# Patient Record
Sex: MALE | Race: WHITE | Employment: FULL TIME | URBAN - METROPOLITAN AREA
[De-identification: names, ages, dates, MRNs, and addresses within clinical notes are randomized per-mention and may not be internally consistent; named-entity substitution may affect disease eponyms.]

---

## 2017-12-05 ENCOUNTER — HOSPITAL ENCOUNTER (OUTPATIENT)
Dept: PREADMISSION TESTING | Age: 57
Discharge: HOME | End: 2017-12-05
Payer: COMMERCIAL

## 2017-12-05 VITALS — HEIGHT: 69 IN | BODY MASS INDEX: 46.65 KG/M2 | WEIGHT: 315 LBS

## 2017-12-05 LAB
ANION GAP SERPL CALC-SCNC: 7 MMOL/L (ref 3–18)
ATRIAL RATE: 69 BPM
BUN SERPL-MCNC: 16 MG/DL (ref 7–18)
BUN/CREAT SERPL: 15 (ref 12–20)
CALCIUM SERPL-MCNC: 9.1 MG/DL (ref 8.5–10.1)
CALCULATED P AXIS, ECG09: 45 DEGREES
CALCULATED R AXIS, ECG10: 22 DEGREES
CALCULATED T AXIS, ECG11: 41 DEGREES
CHLORIDE SERPL-SCNC: 104 MMOL/L (ref 100–108)
CO2 SERPL-SCNC: 30 MMOL/L (ref 21–32)
CREAT SERPL-MCNC: 1.05 MG/DL (ref 0.6–1.3)
DIAGNOSIS, 93000: NORMAL
ERYTHROCYTE [DISTWIDTH] IN BLOOD BY AUTOMATED COUNT: 12.7 % (ref 11.6–14.5)
GLUCOSE SERPL-MCNC: 127 MG/DL (ref 74–99)
HBA1C MFR BLD: 6.9 % (ref 4.5–5.6)
HCT VFR BLD AUTO: 41.7 % (ref 36–48)
HGB BLD-MCNC: 14.2 G/DL (ref 13–16)
MCH RBC QN AUTO: 31.2 PG (ref 24–34)
MCHC RBC AUTO-ENTMCNC: 34.1 G/DL (ref 31–37)
MCV RBC AUTO: 91.6 FL (ref 74–97)
P-R INTERVAL, ECG05: 152 MS
PLATELET # BLD AUTO: 240 K/UL (ref 135–420)
PMV BLD AUTO: 10 FL (ref 9.2–11.8)
POTASSIUM SERPL-SCNC: 4.3 MMOL/L (ref 3.5–5.5)
Q-T INTERVAL, ECG07: 416 MS
QRS DURATION, ECG06: 96 MS
QTC CALCULATION (BEZET), ECG08: 445 MS
RBC # BLD AUTO: 4.55 M/UL (ref 4.7–5.5)
SODIUM SERPL-SCNC: 141 MMOL/L (ref 136–145)
VENTRICULAR RATE, ECG03: 69 BPM
WBC # BLD AUTO: 7.9 K/UL (ref 4.6–13.2)

## 2017-12-05 PROCEDURE — 93005 ELECTROCARDIOGRAM TRACING: CPT

## 2017-12-05 PROCEDURE — 80048 BASIC METABOLIC PNL TOTAL CA: CPT | Performed by: ORTHOPAEDIC SURGERY

## 2017-12-05 PROCEDURE — 83036 HEMOGLOBIN GLYCOSYLATED A1C: CPT | Performed by: ANESTHESIOLOGY

## 2017-12-05 PROCEDURE — 85027 COMPLETE CBC AUTOMATED: CPT | Performed by: ORTHOPAEDIC SURGERY

## 2017-12-05 RX ORDER — SODIUM CHLORIDE, SODIUM LACTATE, POTASSIUM CHLORIDE, CALCIUM CHLORIDE 600; 310; 30; 20 MG/100ML; MG/100ML; MG/100ML; MG/100ML
125 INJECTION, SOLUTION INTRAVENOUS CONTINUOUS
Status: CANCELLED | OUTPATIENT
Start: 2017-12-05

## 2017-12-05 RX ORDER — AMLODIPINE BESYLATE 5 MG/1
5 TABLET ORAL
COMMUNITY

## 2017-12-05 RX ORDER — MINERAL OIL
180 ENEMA (ML) RECTAL DAILY
COMMUNITY

## 2017-12-05 NOTE — PERIOP NOTES
Fasting blood sugar on admit PCP aware of scheduled surgery does not meet criteria for special population

## 2017-12-18 PROBLEM — I10 HTN (HYPERTENSION): Chronic | Status: ACTIVE | Noted: 2017-12-18

## 2017-12-18 PROBLEM — S83.242A TEAR OF MEDIAL MENISCUS OF LEFT KNEE, CURRENT: Chronic | Status: ACTIVE | Noted: 2017-12-18

## 2017-12-18 PROBLEM — E78.00 HIGH CHOLESTEROL: Chronic | Status: ACTIVE | Noted: 2017-12-18

## 2017-12-19 NOTE — H&P
History and Physical        Patient: Manny Stauffer               Sex: male          DOA: (Not on file)         YOB: 1960      Age:  56 y.o.        LOS:  LOS: 0 days        HPI:   Cheikh Yousif is in for follow-up of his left knee medial meniscal tear as seen by MRI/right shoulder subacromial bursitis/right upper extremity cubital tunnel syndrome/left greater than right hip bursitis. His left knee where he has a medial meniscal tear we had talked about arthroscopy. He continues to have pain in the knee despite wearing a brace. Standing AP, tunnel, lateral, and sunrise views of the left knee were obtained and interpreted in the office today and reveal no periosteal reaction, no medullary lesions, no osteopenia, well-aligned joint spaces, and no chondrolysis. Past Medical History:   Diagnosis Date    Arthritis     Bursitis of right shoulder     Chronic pain     right shoulder and lower back.  Diabetes ([address]     GERD (gastroesophageal reflux disease)     Hypertension     Morbid obesity ([address]        Past Surgical History:   Procedure Laterality Date    HX GI      EGD, Colonoscopy    HX ORTHOPAEDIC Left     CTR    HX OTHER SURGICAL      wisdom teeth extraction ,bone graft    HX OTHER SURGICAL         No family history on file. Social History     Social History    Marital status:      Spouse name: N/A    Number of children: N/A    Years of education: N/A     Social History Main Topics    Smoking status: Current Some Day Smoker    Smokeless tobacco: Never Used      Comment: one cigar 3 x year    Alcohol use 3.6 oz/week     2 Glasses of wine, 2 Cans of beer, 2 Shots of liquor per week    Drug use: No    Sexual activity: Yes     Partners: Female     Other Topics Concern    Not on file     Social History Narrative       Prior to Admission medications    Medication Sig Start Date End Date Taking?  Authorizing Provider   amLODIPine (NORVASC) 5 mg tablet Take 5 mg by mouth daily. Historical Provider   dulaglutide (TRULICITY) 1.5 WN/7.1 mL sub-q pen 1.5 mg by SubCUTAneous route every seven (7) days. Historical Provider   fexofenadine (ALLEGRA) 180 mg tablet Take 180 mg by mouth daily. Historical Provider   metFORMIN (GLUCOPHAGE) 1,000 mg tablet Take 1,000 mg by mouth two (2) times daily (with meals). Historical Provider   ergocalciferol (ERGOCALCIFEROL) 50,000 unit capsule Take 50,000 Units by mouth every Monday. Historical Provider   irbesartan (AVAPRO) 150 mg tablet Take 150 mg by mouth two (2) times a day. Historical Provider   atorvastatin (LIPITOR) 20 mg tablet Take 20 mg by mouth. Monday, Tuesday, Wednesday, Thursday, Friday    Historical Provider   fluocinoNIDE (LIDEX) 0.05 % external solution Apply  to affected area daily as needed. Historical Provider   triamcinolone acetonide (KENALOG) 0.1 % topical cream Apply  to affected area daily as needed for Skin Irritation. use thin layer    Historical Provider   sildenafil citrate (VIAGRA) 100 mg tablet Take 100 mg by mouth as needed. Historical Provider   zolpidem (AMBIEN) 5 mg tablet Take 5 mg by mouth nightly as needed for Sleep. Historical Provider   HYDROcodone-acetaminophen (NORCO) 5-325 mg per tablet Take 1 Tab by mouth every four (4) hours as needed for Pain. Historical Provider   metaxalone (SKELAXIN) 800 mg tablet Take 800 mg by mouth every six (6) hours as needed for Pain. Historical Provider   traMADol (ULTRAM) 50 mg tablet Take 50 mg by mouth every six (6) hours as needed for Pain. Historical Provider   amLODIPine (NORVASC) 2.5 mg tablet Take 2.5 mg by mouth every evening. Historical Provider   carvedilol (COREG) 12.5 mg tablet Take 12.5 mg by mouth two (2) times a day. Historical Provider   esomeprazole (NEXIUM) 20 mg capsule Take 20 mg by mouth daily.     Historical Provider       Allergies   Allergen Reactions    Bydureon [Exenatide Microspheres] Other (comments) Scar tissue under skin       Review of Systems  A complete review of systems was completed. Pertinent positives include numbness/tingling and sprain/strain. Pertinent negatives include blurred vision, chest pain, chills, cold, discharge of the eyes, dizziness, double vision, fever, headache, hearing loss, heart murmur, itching of the eyes, palpitations, redness of the eyes, rheumatic fever, ringing in ears, sore throat/hoarseness, weight change, abdominal pain, anxiety, bipolar disorder, bladder/kidney infection, bloody stool, blood in urine, burning sensation, changes in mood, chronic cough, depression, diarrhea, difficulty breathing, difficulty swallowing, fainting, frequent urinating, fracture/dislocation, gas/bloating, gout, hemorrhoids, incontinence, joint pain, joint stiffness, loss of balance, memory loss, muscle weakness, nausea/vomiting, pain on breathing, painful urination, psoriasis, rash/itching, Raynaud's phenomenon, rheumatoid disease, seizure disorder, shortness of breath, swelling of feet, tendinitis, varicose veins and wheezing. Physical Exam:      There were no vitals taken for this visit. Physical Exam:  Physical exam shows a healthy-appearing middle-aged white male. The left knee is tender on the posteromedial joint line. Examination of the left knee demonstrates the patient has full extension to flexion well beyond 90 degrees. The patient has a negative Lachman and has no varus or valgus instability at zero degrees or 30 degrees. There is a negative posterior sag. There is no knee effusion. There is no swelling, ecchymosis or wounds. The patient has no lateral joint line pain and no popliteal pain. The patient has a negative patellar inhibition, a negative patellar grind and a negative patellar apprehension test.  There is a negative Maxi Maneuver. There is no pain at the inferior pole of the patella or the tibial tubercle.   The patient has 5/5 muscle strength with good quadriceps tone. The patient has 2+ pedal pulses with normal motor strength of the foot and ankle and normal light-touch sensation in the foot and lower leg. Assessment/Plan     Principal Problem:    Tear of medial meniscus of left knee, current (12/18/2017)    Active Problems:    High cholesterol (12/18/2017)      HTN (hypertension) (12/18/2017)      Dr. Yudi Estrada scheduled him for a left knee arthroscopy with partial medial meniscectomy on 12/21/17. He will be out of work likely about six weeks' time. Dr. Yudi Estrada issued Oscar Robledo for postoperative pain use. He understands the risks associated with the surgery including DVT, PE, bleeding, infection, need for future surgery, neurovascular compromise amongst others. He will follow up with Dr. Yudi Estrada one week post op.

## 2017-12-20 RX ORDER — DIPHENHYDRAMINE HCL 25 MG
25 CAPSULE ORAL
Status: CANCELLED | OUTPATIENT
Start: 2017-12-20

## 2017-12-20 RX ORDER — SODIUM CHLORIDE 0.9 % (FLUSH) 0.9 %
5-10 SYRINGE (ML) INJECTION EVERY 8 HOURS
Status: CANCELLED | OUTPATIENT
Start: 2017-12-20

## 2017-12-20 RX ORDER — SODIUM CHLORIDE, SODIUM LACTATE, POTASSIUM CHLORIDE, CALCIUM CHLORIDE 600; 310; 30; 20 MG/100ML; MG/100ML; MG/100ML; MG/100ML
125 INJECTION, SOLUTION INTRAVENOUS CONTINUOUS
Status: CANCELLED | OUTPATIENT
Start: 2017-12-20 | End: 2017-12-21

## 2017-12-20 RX ORDER — SODIUM CHLORIDE 0.9 % (FLUSH) 0.9 %
5-10 SYRINGE (ML) INJECTION AS NEEDED
Status: CANCELLED | OUTPATIENT
Start: 2017-12-20

## 2017-12-21 ENCOUNTER — HOSPITAL ENCOUNTER (OUTPATIENT)
Age: 57
Setting detail: OUTPATIENT SURGERY
Discharge: HOME | End: 2017-12-21
Attending: ORTHOPAEDIC SURGERY | Admitting: ORTHOPAEDIC SURGERY
Payer: COMMERCIAL

## 2017-12-21 ENCOUNTER — ANESTHESIA EVENT (OUTPATIENT)
Dept: SURGERY | Age: 57
End: 2017-12-21
Payer: COMMERCIAL

## 2017-12-21 ENCOUNTER — ANESTHESIA (OUTPATIENT)
Dept: SURGERY | Age: 57
End: 2017-12-21
Payer: COMMERCIAL

## 2017-12-21 VITALS
OXYGEN SATURATION: 95 % | RESPIRATION RATE: 16 BRPM | HEART RATE: 65 BPM | WEIGHT: 315 LBS | SYSTOLIC BLOOD PRESSURE: 124 MMHG | TEMPERATURE: 97.6 F | BODY MASS INDEX: 46.65 KG/M2 | DIASTOLIC BLOOD PRESSURE: 68 MMHG | HEIGHT: 69 IN

## 2017-12-21 PROBLEM — S83.242A ACUTE MEDIAL MENISCUS TEAR OF LEFT KNEE: Status: ACTIVE | Noted: 2017-12-21

## 2017-12-21 LAB
GLUCOSE BLD STRIP.AUTO-MCNC: 124 MG/DL (ref 70–110)
GLUCOSE BLD STRIP.AUTO-MCNC: 139 MG/DL (ref 70–110)

## 2017-12-21 PROCEDURE — 76010000138 HC OR TIME 0.5 TO 1 HR: Performed by: ORTHOPAEDIC SURGERY

## 2017-12-21 PROCEDURE — 76210000063 HC OR PH I REC FIRST 0.5 HR: Performed by: ORTHOPAEDIC SURGERY

## 2017-12-21 PROCEDURE — 77030020782 HC GWN BAIR PAWS FLX 3M -B: Performed by: ORTHOPAEDIC SURGERY

## 2017-12-21 PROCEDURE — 77030008477 HC STYL SATN SLP COVD -A: Performed by: ANESTHESIOLOGY

## 2017-12-21 PROCEDURE — 74011250636 HC RX REV CODE- 250/636

## 2017-12-21 PROCEDURE — 77030034478 HC TU IRR ARTHRO PT ARTH -B: Performed by: ORTHOPAEDIC SURGERY

## 2017-12-21 PROCEDURE — 77030036563 HC WRP CLD THER KNE S2SG -B: Performed by: ORTHOPAEDIC SURGERY

## 2017-12-21 PROCEDURE — 74011000250 HC RX REV CODE- 250: Performed by: ORTHOPAEDIC SURGERY

## 2017-12-21 PROCEDURE — 77030002933 HC SUT MCRYL J&J -A: Performed by: ORTHOPAEDIC SURGERY

## 2017-12-21 PROCEDURE — 77030018835 HC SOL IRR LR ICUM -A: Performed by: ORTHOPAEDIC SURGERY

## 2017-12-21 PROCEDURE — 77030008683 HC TU ET CUF COVD -A: Performed by: ANESTHESIOLOGY

## 2017-12-21 PROCEDURE — 77030008509 HC TBNG IN/OUT FLO LEMV -B: Performed by: ORTHOPAEDIC SURGERY

## 2017-12-21 PROCEDURE — 77030028224 HC PDNG CST BSNM -A: Performed by: ORTHOPAEDIC SURGERY

## 2017-12-21 PROCEDURE — 77030006643: Performed by: ANESTHESIOLOGY

## 2017-12-21 PROCEDURE — 74011250636 HC RX REV CODE- 250/636: Performed by: ORTHOPAEDIC SURGERY

## 2017-12-21 PROCEDURE — 76060000032 HC ANESTHESIA 0.5 TO 1 HR: Performed by: ORTHOPAEDIC SURGERY

## 2017-12-21 PROCEDURE — 76210000026 HC REC RM PH II 1 TO 1.5 HR: Performed by: ORTHOPAEDIC SURGERY

## 2017-12-21 PROCEDURE — 77030006884 HC BLD SHV INCIS S&N -B: Performed by: ORTHOPAEDIC SURGERY

## 2017-12-21 PROCEDURE — 74011000250 HC RX REV CODE- 250

## 2017-12-21 PROCEDURE — 82962 GLUCOSE BLOOD TEST: CPT

## 2017-12-21 RX ORDER — SODIUM CHLORIDE, SODIUM LACTATE, POTASSIUM CHLORIDE, CALCIUM CHLORIDE 600; 310; 30; 20 MG/100ML; MG/100ML; MG/100ML; MG/100ML
125 INJECTION, SOLUTION INTRAVENOUS CONTINUOUS
Status: DISCONTINUED | OUTPATIENT
Start: 2017-12-21 | End: 2017-12-21 | Stop reason: HOSPADM

## 2017-12-21 RX ORDER — DEXTROSE 50 % IN WATER (D50W) INTRAVENOUS SYRINGE
25-50 AS NEEDED
Status: CANCELLED | OUTPATIENT
Start: 2017-12-21

## 2017-12-21 RX ORDER — HYDROMORPHONE HYDROCHLORIDE 2 MG/ML
0.5 INJECTION, SOLUTION INTRAMUSCULAR; INTRAVENOUS; SUBCUTANEOUS
Status: CANCELLED | OUTPATIENT
Start: 2017-12-21

## 2017-12-21 RX ORDER — PROPOFOL 10 MG/ML
INJECTION, EMULSION INTRAVENOUS AS NEEDED
Status: DISCONTINUED | OUTPATIENT
Start: 2017-12-21 | End: 2017-12-21 | Stop reason: HOSPADM

## 2017-12-21 RX ORDER — SUCCINYLCHOLINE CHLORIDE 20 MG/ML
INJECTION INTRAMUSCULAR; INTRAVENOUS AS NEEDED
Status: DISCONTINUED | OUTPATIENT
Start: 2017-12-21 | End: 2017-12-21 | Stop reason: HOSPADM

## 2017-12-21 RX ORDER — KETOROLAC TROMETHAMINE 30 MG/ML
INJECTION, SOLUTION INTRAMUSCULAR; INTRAVENOUS AS NEEDED
Status: DISCONTINUED | OUTPATIENT
Start: 2017-12-21 | End: 2017-12-21 | Stop reason: HOSPADM

## 2017-12-21 RX ORDER — FENTANYL CITRATE 50 UG/ML
INJECTION, SOLUTION INTRAMUSCULAR; INTRAVENOUS AS NEEDED
Status: DISCONTINUED | OUTPATIENT
Start: 2017-12-21 | End: 2017-12-21 | Stop reason: HOSPADM

## 2017-12-21 RX ORDER — ONDANSETRON 2 MG/ML
INJECTION INTRAMUSCULAR; INTRAVENOUS AS NEEDED
Status: DISCONTINUED | OUTPATIENT
Start: 2017-12-21 | End: 2017-12-21 | Stop reason: HOSPADM

## 2017-12-21 RX ORDER — INSULIN LISPRO 100 [IU]/ML
INJECTION, SOLUTION INTRAVENOUS; SUBCUTANEOUS ONCE
Status: CANCELLED | OUTPATIENT
Start: 2017-12-21 | End: 2017-12-21

## 2017-12-21 RX ORDER — NALOXONE HYDROCHLORIDE 0.4 MG/ML
0.1 INJECTION, SOLUTION INTRAMUSCULAR; INTRAVENOUS; SUBCUTANEOUS
Status: CANCELLED | OUTPATIENT
Start: 2017-12-21

## 2017-12-21 RX ORDER — MIDAZOLAM HYDROCHLORIDE 1 MG/ML
INJECTION, SOLUTION INTRAMUSCULAR; INTRAVENOUS AS NEEDED
Status: DISCONTINUED | OUTPATIENT
Start: 2017-12-21 | End: 2017-12-21 | Stop reason: HOSPADM

## 2017-12-21 RX ORDER — MAGNESIUM SULFATE 100 %
4 CRYSTALS MISCELLANEOUS AS NEEDED
Status: CANCELLED | OUTPATIENT
Start: 2017-12-21

## 2017-12-21 RX ORDER — SODIUM CHLORIDE 0.9 % (FLUSH) 0.9 %
5-10 SYRINGE (ML) INJECTION AS NEEDED
Status: CANCELLED | OUTPATIENT
Start: 2017-12-21

## 2017-12-21 RX ORDER — SODIUM CHLORIDE, SODIUM LACTATE, POTASSIUM CHLORIDE, CALCIUM CHLORIDE 600; 310; 30; 20 MG/100ML; MG/100ML; MG/100ML; MG/100ML
50 INJECTION, SOLUTION INTRAVENOUS CONTINUOUS
Status: CANCELLED | OUTPATIENT
Start: 2017-12-21

## 2017-12-21 RX ORDER — OXYCODONE AND ACETAMINOPHEN 5; 325 MG/1; MG/1
1 TABLET ORAL AS NEEDED
Status: CANCELLED | OUTPATIENT
Start: 2017-12-21

## 2017-12-21 RX ORDER — ONDANSETRON 2 MG/ML
4 INJECTION INTRAMUSCULAR; INTRAVENOUS ONCE
Status: CANCELLED | OUTPATIENT
Start: 2017-12-21 | End: 2017-12-21

## 2017-12-21 RX ORDER — FENTANYL CITRATE 50 UG/ML
25 INJECTION, SOLUTION INTRAMUSCULAR; INTRAVENOUS
Status: CANCELLED | OUTPATIENT
Start: 2017-12-21 | End: 2017-12-21

## 2017-12-21 RX ORDER — GLYCOPYRROLATE 0.2 MG/ML
INJECTION INTRAMUSCULAR; INTRAVENOUS AS NEEDED
Status: DISCONTINUED | OUTPATIENT
Start: 2017-12-21 | End: 2017-12-21 | Stop reason: HOSPADM

## 2017-12-21 RX ORDER — DEXAMETHASONE SODIUM PHOSPHATE 4 MG/ML
INJECTION, SOLUTION INTRA-ARTICULAR; INTRALESIONAL; INTRAMUSCULAR; INTRAVENOUS; SOFT TISSUE AS NEEDED
Status: DISCONTINUED | OUTPATIENT
Start: 2017-12-21 | End: 2017-12-21 | Stop reason: HOSPADM

## 2017-12-21 RX ORDER — LIDOCAINE HYDROCHLORIDE 20 MG/ML
INJECTION, SOLUTION EPIDURAL; INFILTRATION; INTRACAUDAL; PERINEURAL AS NEEDED
Status: DISCONTINUED | OUTPATIENT
Start: 2017-12-21 | End: 2017-12-21 | Stop reason: HOSPADM

## 2017-12-21 RX ADMIN — ONDANSETRON 4 MG: 2 INJECTION INTRAMUSCULAR; INTRAVENOUS at 08:56

## 2017-12-21 RX ADMIN — SUCCINYLCHOLINE CHLORIDE 120 MG: 20 INJECTION INTRAMUSCULAR; INTRAVENOUS at 08:53

## 2017-12-21 RX ADMIN — SODIUM CHLORIDE, SODIUM LACTATE, POTASSIUM CHLORIDE, AND CALCIUM CHLORIDE 125 ML/HR: 600; 310; 30; 20 INJECTION, SOLUTION INTRAVENOUS at 07:55

## 2017-12-21 RX ADMIN — SODIUM CHLORIDE, SODIUM LACTATE, POTASSIUM CHLORIDE, AND CALCIUM CHLORIDE 125 ML/HR: 600; 310; 30; 20 INJECTION, SOLUTION INTRAVENOUS at 09:55

## 2017-12-21 RX ADMIN — KETOROLAC TROMETHAMINE 30 MG: 30 INJECTION, SOLUTION INTRAMUSCULAR; INTRAVENOUS at 08:56

## 2017-12-21 RX ADMIN — DEXAMETHASONE SODIUM PHOSPHATE 4 MG: 4 INJECTION, SOLUTION INTRA-ARTICULAR; INTRALESIONAL; INTRAMUSCULAR; INTRAVENOUS; SOFT TISSUE at 08:56

## 2017-12-21 RX ADMIN — GLYCOPYRROLATE 0.2 MG: 0.2 INJECTION INTRAMUSCULAR; INTRAVENOUS at 08:43

## 2017-12-21 RX ADMIN — PROPOFOL 200 MG: 10 INJECTION, EMULSION INTRAVENOUS at 08:53

## 2017-12-21 RX ADMIN — MIDAZOLAM HYDROCHLORIDE 2 MG: 1 INJECTION, SOLUTION INTRAMUSCULAR; INTRAVENOUS at 08:43

## 2017-12-21 RX ADMIN — FENTANYL CITRATE 100 MCG: 50 INJECTION, SOLUTION INTRAMUSCULAR; INTRAVENOUS at 08:53

## 2017-12-21 RX ADMIN — LIDOCAINE HYDROCHLORIDE 80 MG: 20 INJECTION, SOLUTION EPIDURAL; INFILTRATION; INTRACAUDAL; PERINEURAL at 08:53

## 2017-12-21 NOTE — OP NOTES
SURGICAL ARTHROSCOPY LEFT KNEE, PARTIAL MEDIAL MENISCECTOMY    Patient: Elisabeth Hardy MRN: [id number]  [facility]: [id number]   YOB: 1960  Age: 56 y.o. Sex: male      Date of Surgery:12/21/2017      PREOPERATIVE DIAGNOSIS: Left posterior horn medial meniscal tear. POSTOPERATIVE DIAGNOSIS: Left posterior horn medial meniscal tear. PROCEDURES PERFORMED: Surgical arthroscopy, left knee, with partial medial meniscectomy. SURGEON: John Paul Cunha III, MD    ANESTHESIA: General    COMPLICATIONS: None. TOURNIQUET TIME: None. ESTIMATED BLOOD LOSS: Minimal.    SPECIMENS REMOVED: none    INDICATIONS: A 56y.o. year-old WHITE OR  male with known left knee medial meniscal tear as documented by MRI. The patient now presents for arthroscopic treatment due to continued pain despite conservative intervention for mechanical symptoms associated with knee pain from the recent meniscal tear. DESCRIPTION OF PROCEDURE: The patient was brought to the operating theater  and after adequate anesthesia, the left knee was prepped and draped in the  typical sterile fashion. The knee was instilled with 30 mL of 0.25%  Marcaine with 1:200,000 epinephrine and 4 mg of morphine sulfate. Standard  anterolateral and anteromedial portals were anesthetized with the same  mixture without morphine. The arthroscope was placed laterally and the probe medially. Findings at this time showed that the medial compartment had grade 2 articular surfaces with a significant medial meniscus tear on probing. Using the upbiting punch, this was balanced and contoured, removing approximately 20% of the entire size of the medial meniscus. All free fragments were removed with the Incisor Plus blade. Further probing showed a nice stable meniscal rim. No chondroplasty was required of the medial femoral condyle. The ACL and PCL were normal. The lateral joint line was examined in the figure-of-four position.   The lateral compartment had normal articular surfaces with a normal lateral meniscus. The patellofemoral joint was examined last in terminal extension. It showed a grade 3 trochlea and grade 3 patella. The joint was then thoroughly irrigated and decompressed. There was no evidence of synovitis and both the medial and lateral gutters were cleared  of any loose bodies. The joint space was decompressed. The  4-0 Monocryl's were used to close the portals. They were steri-stripped,  had 4 x 4's placed and an Ace wrap. The patient was then awakened from  anesthesia, and returned to the recovery room in awake, in stable  condition.  All instrument, sponge and needle counts were correct.        Wesley Siddiqui MD  12/21/2017

## 2017-12-21 NOTE — ANESTHESIA POSTPROCEDURE EVALUATION
Post-Anesthesia Evaluation and Assessment    Cardiovascular Function/Vital Signs  Visit Vitals    /75    Pulse 81    Temp 36.2 °C (97.1 °F)    Resp 16    Ht 5' 9\" (1.753 m)    Wt 143.4 kg (316 lb 1 oz)    SpO2 96%    BMI 46.67 kg/m2       Patient is status post Procedure(s):  SURGICAL ARTHROSCOPY LEFT KNEE W/PARTIAL MEDIAL MENISCECTOMY . Nausea/Vomiting: Controlled. Postoperative hydration reviewed and adequate. Pain:  Pain Scale 1: Numeric (0 - 10) (12/21/17 0954)  Pain Intensity 1: 0 (12/21/17 0954)   Managed. Neurological Status:   Neuro (WDL): Within Defined Limits (12/21/17 0926)   At baseline. Mental Status and Level of Consciousness: Arousable. Pulmonary Status:   O2 Device: Room air (12/21/17 0954)   Adequate oxygenation and airway patent. Complications related to anesthesia: None    Post-anesthesia assessment completed. No concerns. Patient has met all discharge requirements.     Signed By: Eloy Mckeon CRNA    December 21, 2017

## 2017-12-21 NOTE — IP AVS SNAPSHOT
[address] [facility] 
 
 
 [address] [facility] [address] Patient: Joseline Self MRN: [id number] MGN:[birth year] About your hospitalization You were admitted on:  December 21, 2017 You last received care in the:  THE [facility] PHASE 2 RECOVERY You were discharged on:  December 21, 2017 Why you were hospitalized Your primary diagnosis was:  Tear Of Medial Meniscus Of Left Knee, Current Your diagnoses also included:  High Cholesterol, Htn (Hypertension), Acute Medial Meniscus Tear Of Left Knee Things You Need To Do (next 8 weeks) Follow up with Catina Chua MD  
  
Phone:  [phone] Where:  [address] [place] [address], [address], [address] [facility] [address] Discharge Orders None A check rita indicates which time of day the medication should be taken. My Medications STOP taking these medications   
 traMADol 50 mg tablet Commonly known as:  ULTRAM  
   
  
  
TAKE these medications as instructed Instructions Each Dose to Equal  
 Morning Noon Evening Bedtime AMBIEN 5 mg tablet Generic drug:  zolpidem Your last dose was: Your next dose is: Take 5 mg by mouth nightly as needed for Sleep.  
 5 mg * amLODIPine 2.5 mg tablet Commonly known as:  [place] Citron Your last dose was: Your next dose is: Take 2.5 mg by mouth every evening. 2.5 mg  
    
   
   
   
  
 * amLODIPine 5 mg tablet Commonly known as:  [place] Citron Your last dose was: Your next dose is: Take 5 mg by mouth daily. 5 mg  
    
   
   
   
  
 atorvastatin 20 mg tablet Commonly known as:  LIPITOR Your last dose was: Your next dose is: Take 20 mg by mouth. Monday, Tuesday, Wednesday, Thursday, Friday 20 mg  
    
   
   
   
  
 carvedilol 12.5 mg tablet Commonly known as:  Zoë Do Your last dose was: Your next dose is: Take 12.5 mg by mouth two (2) times a day. 12.5 mg  
    
   
   
   
  
 dulaglutide 1.5 mg/0.5 mL sub-q pen Commonly known as:  TRULICITY Your last dose was: Your next dose is:    
   
   
 1.5 mg by SubCUTAneous route every seven (7) days. 1.5 mg  
    
   
   
   
  
 ergocalciferol 50,000 unit capsule Commonly known as:  ERGOCALCIFEROL Your last dose was: Your next dose is: Take 50,000 Units by mouth every Monday. [address] Units  
    
   
   
   
  
 fexofenadine 180 mg tablet Commonly known as:  Manjula Saran Your last dose was: Your next dose is: Take 180 mg by mouth daily. 180 mg  
    
   
   
   
  
 fluocinoNIDE 0.05 % external solution Commonly known as:  LIDEX Your last dose was: Your next dose is:    
   
   
 Apply  to affected area daily as needed. HYDROcodone-acetaminophen 5-325 mg per tablet Commonly known as:  Gracie Macias Your last dose was: Your next dose is: Take 1 Tab by mouth every four (4) hours as needed for Pain. 1 Tab  
    
   
   
   
  
 irbesartan 150 mg tablet Commonly known as:  AVAPRO Your last dose was: Your next dose is: Take 150 mg by mouth two (2) times a day. 150 mg  
    
   
   
   
  
 metaxalone 800 mg tablet Commonly known as:  SKELAXIN Your last dose was: Your next dose is: Take 800 mg by mouth every six (6) hours as needed for Pain. 800 mg  
    
   
   
   
  
 metFORMIN 1,000 mg tablet Commonly known as:  GLUCOPHAGE Your last dose was: Your next dose is: Take 1,000 mg by mouth two (2) times daily (with meals). 1000 mg NexIUM 20 mg capsule Generic drug:  esomeprazole Your last dose was: Your next dose is: Take 20 mg by mouth daily.   
 20 mg  
    
   
 triamcinolone acetonide 0.1 % topical cream  
Commonly known as:  KENALOG Your last dose was: Your next dose is:    
   
   
 Apply  to affected area daily as needed for Skin Irritation. use thin layer VIAGRA 100 mg tablet Generic drug:  sildenafil citrate Your last dose was: Your next dose is: Take 100 mg by mouth as needed. 100 mg * Notice: This list has 2 medication(s) that are the same as other medications prescribed for you. Read the directions carefully, and ask your doctor or other care provider to review them with you. Discharge Instructions Wesley Siddiqui III, MD Alcario Schanz, PA-C Lower Extremity Surgery Discharge Instructions Please take the time to review the following instructions before you leave the hospital and use them as guidelines during your recovery from surgery. If you have any questions you may contact my office at [phone]. Wound Care/Dressing Changes: 
 
   Keep the surgical dressing on for two days from the day of your surgery. Once you remove this, no dressing is necessary if there is no drainage. You may change your dressing as needed. Beginning the 2 days after you are discharged from the hospital you can change your dressing daily. A big, bulky dressing isn't necessary as long as there isn't any drainage from the incisions. You can put a band-aid or a pice of gauze over each incision and wear an ACE bandage as needed for comfort and swelling. Don't remove your dressing or get them wet. · It isn't necessary to apply antibiotic ointment to your incisions. Sutures will be removed at your one week post-op visit. Staples (if you have them) are removed in two weeks. If you have steri-strips over your incision they will start to peel off in 7-10 days as you get them wet. They don't need to be removed prior to that. When they begin to peel off you can remove them. They should all be removed by 14 days from your surgery. If your wound is closed with Dermabond nothing has to be done or removed. Showering/Bathing: You may shower 2 days after surgery. Your dressing may be removed for showering. You may get your incisions wet in the shower. Don't vigorously scrub the area where your incisions are. Apply a clean, dry dressing after drying off the area of your incisions. Don't take a tub bath, get in a swimming pool or Jacuzzi until the incisions are completely healed. Do not soak your incisions under water. Do not get the dressing wet. Once you remove it two days from surgery, you may get the incision wet if there is no drainage. Weight Bearing Status/Braces/Activity: 
 
   You may walk as tolerated and perform your normal daily activities. Use crutches, a walker, or a cane only if you need them. You should strive to achieve full range of motion in your knee as soon as possible. Please start using a stationary bike or walking for exercise 3 or 4 days after surgery. We would like for you to return to your normal activities as soon as possible. If you feel comfortable returning to work, you may do so at any time. Weight bearing as tolerated with the knee immobilizer in place. Use crutches, cane, or walker as needed. You should sleep in your knee immobilizer. Non-weight bearing. Please do not bear any weight on your leg. You may use your toes for balance when walking with a cane or crutches. Ice/Elevation: 
 
Continue ice and elevation consistently for 48 hours after surgery. When elevating your knee elevate it on about 4 pillows to be sure it is above your heart. After 48 hours, you should ice your knee 3 times per day, for 20 minutes at a time for the next 5 days.   After one week from surgery, you may use ice and elevation as needed for pain and swelling. Diet: 
 
You may advance to your regular diet as tolerated. Medication: 
 
1. You will be given a prescription for pain medications when you are discharged from the hospital.  Take the medication as needed according to the directions on the prescription bottle. Possible side effects of the medication include dizziness, headache, nausea, vomiting, constipation and urinary retention. If you experience any of these side effects call the office so that we can assist you in relieving them. Discontinue the use of the pain medication if you develop itching, rash, shortness of breath or difficulties swallowing. If these symptoms become severe or aren't relieved by discontinuing the medication you should seek immediate medical attention. Refills of pain medication are authorized during office hours only (8AM - 5PM Mon thru Fri). 2. If you were prescribed Percocet/oxycodone or Dilaudid/hydromorphone you must have a written prescription. These medications legally cannot be called in to a pharmacy. 3. You may take over the counter Ibuprofen/Advil/Aleve between dosages of your pain medication if needed. Do not take Tylenol in addition to your pain medication as most of the pain medication already contains Tylenol. Exceptions include Dilaudid/hydromorphone, Demerol/meperidine and roxicodone. Do not exceed 3000 mg of Tylenol per day. Ex:  (hydrocodon 5/325mg = 325 mg of Tylenol) 4. If you have had a joint replacement you should take Xarelto 10 mg daily for 28 days from the date of your surgery. This will help to prevent blood clots from forming in your legs. 5. You may resume the medication you were taking prior to your surgery. Pain medication may change the effects of any antidepressant medication.   If you have any questions about possible interactions between your regular medications and the pain medication you should consult the physician who prescribes your regular medications. Follow Up Appointment: If you are unsure of your follow-up appointment date and time, please call [phone]. Please let our  know you are scheduling a post-op appointment. Most appointments should be between 7-14 days after your surgery. Physical Therapy: 
 
   Physical therapy will be discussed with you at your first follow-up appointment with Dr. Duc Mckeon. You don't need to begin physical therapy prior to that visit. You are to participate with [address] [place] [address] as arranged pre-operatively in the convience of your own home. Physical therapy will be discussed with you at your first follow-up appointment with Dr. Duc Mckeon. You don't need to begin physical therapy prior to that visit. If you already have a therapy appointment, please be sure to attend your sessions as scheduled. If you do not have physical therapy scheduled, please call Dr. Duc Mckeon' office to set up your first appointment as soon as possible. You do not require Physical Therapy. Important signs and symptoms: 
 
If any of the following signs and symptoms occurs, you should contact Dr. Duc Mckeon' office. Please be advised if a problem arises which you feel required immediate medical attention or your are unable to contact Dr. Duc Mckeon' office you should seek immediate medical attention. Signs and symptoms to watch for include: 1. A sudden increase in swelling and/or redness or warmth at the area your surgery was performed which isn't relieved by rest, ice and elevation. 2. Oral temperature greater than 101.5 degrees for 12 hours or more which isn't relieved by an increase in fluid intake and taking two Tylenol every 4-6 hours. Do not exceed 3000 mg of Tylenol per day. 3. Excessive drainage from your incisions or drainage that hasn't stopped by 72 hours. 4. Calf pian, tenderness, redness or swelling which isn't relieved with rest and elevation. 5. Fever, chills, shortness of breath, chest pain, nausea, vomiting or other signs and symptoms which are of concern to you. DISCHARGE SUMMARY from Nurse PATIENT INSTRUCTIONS: 
 
 
F-face looks uneven A-arms unable to move or move unevenly S-speech slurred or non-existent T-time-call 911 as soon as signs and symptoms begin-DO NOT go Back to bed or wait to see if you get better-TIME IS BRAIN. Warning Signs of HEART ATTACK Call 911 if you have these symptoms: 
? Chest discomfort. Most heart attacks involve discomfort in the center of the chest that lasts more than a few minutes, or that goes away and comes back. It can feel like uncomfortable pressure, squeezing, fullness, or pain. ? Discomfort in other areas of the upper body. Symptoms can include pain or discomfort in one or both arms, the back, neck, jaw, or stomach. ? Shortness of breath with or without chest discomfort. ? Other signs may include breaking out in a cold sweat, nausea, or lightheadedness. Don't wait more than five minutes to call [address]! Fast action can save your life. Calling 911 is almost always the fastest way to get lifesaving treatment. Emergency Medical Services staff can begin treatment when they arrive  up to an hour sooner than if someone gets to the hospital by car. Patient armband removed and shredded The discharge information has been reviewed with the patient and guardian. The patient and caregiver verbalized understanding.  
Discharge medications reviewed with the patient and caregiver and appropriate educational materials and side effects teaching were provided. ___________________________________________________________________________________________________________________________________ Introducing [facility]! Teetee Andreia introduces [employer] patient portal. Now you can access parts of your medical record, email your doctor's office, and request medication refills online. 1. In your internet browser, go to [email or web address] 2. Click on the First Time User? Click Here link in the Sign In box. You will see the New Member Sign Up page. 3. Enter your [employer] Access Code exactly as it appears below. You will not need to use this code after youve completed the sign-up process. If you do not sign up before the expiration date, you must request a new code. · [employer] Access Code: [email or web address] Expires: 2/26/2018  6:46 PM 
 
4. Enter the last four digits of your Social Security Number (xxxx) and Date of Birth (mm/dd/yyyy) as indicated and click Submit. You will be taken to the next sign-up page. 5. Create a [employer] ID. This will be your [employer] login ID and cannot be changed, so think of one that is secure and easy to remember. 6. Create a [employer] password. You can change your password at any time. 7. Enter your Password Reset Question and Answer. This can be used at a later time if you forget your password. 8. Enter your e-mail address. You will receive e-mail notification when new information is available in [address] 9. Click Sign Up. You can now view and download portions of your medical record. 10. Click the Download Summary menu link to download a portable copy of your medical information. If you have questions, please visit the Frequently Asked Questions section of the [employer] website. Remember, [employer] is NOT to be used for urgent needs. For medical emergencies, dial 911. Now available from your iPhone and Android! Providers Seen During Your Hospitalization Provider Specialty Primary office phone Luisito Morillo, [address] [facility] Orthopedic Surgery [phone] Your Primary Care Physician (PCP) Primary Care Physician Office Phone Office Fax Naomi Lr [phone] You are allergic to the following Allergen Reactions Bydureon (Exenatide Microspheres) Other (comments) Scar tissue under skin Recent Documentation Height Weight BMI Smoking Status 1.753 m 143.4 kg 46.67 kg/m2 Current Some Day Smoker Emergency Contacts Name Discharge Info Relation Home Work Mobile [address] CAREGIVER [3] Spouse [3]   [phone] Patient Belongings The following personal items are in your possession at time of discharge: 
  Dental Appliances: None  Visual Aid: None      Home Medications: None   Jewelry: None  Clothing: Footwear, Jacket/Coat, Pants, Shirt, Socks, Undergarments (locker 9)    Other Valuables: None Please provide this summary of care documentation to your next provider. Signatures-by signing, you are acknowledging that this After Visit Summary has been reviewed with you and you have received a copy. Patient Signature:  ____________________________________________________________ Date:  ____________________________________________________________  
  
Josph Perfect Provider Signature:  ____________________________________________________________ Date:  ____________________________________________________________

## 2017-12-21 NOTE — DISCHARGE INSTRUCTIONS
Akil Morris III, MD Alyson Plumb, PA-C    Lower Extremity Surgery  Discharge Instructions      Please take the time to review the following instructions before you leave the hospital and use them as guidelines during your recovery from surgery. If you have any questions you may contact my office at [phone]. Wound Care/Dressing Changes:    []   Keep the surgical dressing on for two days from the day of your surgery. Once you remove this, no dressing is necessary if there is no drainage. [x]   You may change your dressing as needed. Beginning the 2 days after you are discharged from the hospital you can change your dressing daily. A big, bulky dressing isn't necessary as long as there isn't any drainage from the incisions. You can put a band-aid or a pice of gauze over each incision and wear an ACE bandage as needed for comfort and swelling. []   Don't remove your dressing or get them wet. · It isn't necessary to apply antibiotic ointment to your incisions. Sutures will be removed at your one week post-op visit. Staples (if you have them) are removed in two weeks. If you have steri-strips over your incision they will start to peel off in 7-10 days as you get them wet. They don't need to be removed prior to that. When they begin to peel off you can remove them. They should all be removed by 14 days from your surgery. If your wound is closed with Dermabond nothing has to be done or removed. Showering/Bathing:    [x]   You may shower 2 days after surgery. Your dressing may be removed for showering. You may get your incisions wet in the shower. Don't vigorously scrub the area where your incisions are. Apply a clean, dry dressing after drying off the area of your incisions. Don't take a tub bath, get in a swimming pool or Jacuzzi until the incisions are completely healed. Do not soak your incisions under water. []   Do not get the dressing wet.   Once you remove it two days from surgery, you may get the incision wet if there is no drainage. Weight Bearing Status/Braces/Activity:    [x]   You may walk as tolerated and perform your normal daily activities. Use crutches, a walker, or a cane only if you need them. You should strive to achieve full range of motion in your knee as soon as possible. Please start using a stationary bike or walking for exercise 3 or 4 days after surgery. We would like for you to return to your normal activities as soon as possible. If you feel comfortable returning to work, you may do so at any time.    []   Weight bearing as tolerated with the knee immobilizer in place. Use crutches, cane, or walker as needed. You should sleep in your knee immobilizer. []   Non-weight bearing. Please do not bear any weight on your leg. You may use your toes for balance when walking with a cane or crutches. Ice/Elevation:    Continue ice and elevation consistently for 48 hours after surgery. When elevating your knee elevate it on about 4 pillows to be sure it is above your heart. After 48 hours, you should ice your knee 3 times per day, for 20 minutes at a time for the next 5 days. After one week from surgery, you may use ice and elevation as needed for pain and swelling. Diet:    You may advance to your regular diet as tolerated. Medication:    1. You will be given a prescription for pain medications when you are discharged from the hospital.  Take the medication as needed according to the directions on the prescription bottle. Possible side effects of the medication include dizziness, headache, nausea, vomiting, constipation and urinary retention. If you experience any of these side effects call the office so that we can assist you in relieving them. Discontinue the use of the pain medication if you develop itching, rash, shortness of breath or difficulties swallowing.   If these symptoms become severe or aren't relieved by discontinuing the medication you should seek immediate medical attention. Refills of pain medication are authorized during office hours only (8AM - 5PM Mon thru Fri). 2. If you were prescribed Percocet/oxycodone or Dilaudid/hydromorphone you must have a written prescription. These medications legally cannot be called in to a pharmacy. 3. You may take over the counter Ibuprofen/Advil/Aleve between dosages of your pain medication if needed. Do not take Tylenol in addition to your pain medication as most of the pain medication already contains Tylenol. Exceptions include Dilaudid/hydromorphone, Demerol/meperidine and roxicodone. Do not exceed 3000 mg of Tylenol per day. Ex:  (hydrocodon 5/325mg = 325 mg of Tylenol)   4. If you have had a joint replacement you should take Xarelto 10 mg daily for 28 days from the date of your surgery. This will help to prevent blood clots from forming in your legs. 5. You may resume the medication you were taking prior to your surgery. Pain medication may change the effects of any antidepressant medication. If you have any questions about possible interactions between your regular medications and the pain medication you should consult the physician who prescribes your regular medications. Follow Up Appointment:    If you are unsure of your follow-up appointment date and time, please call [phone]. Please let our  know you are scheduling a post-op appointment. Most appointments should be between 7-14 days after your surgery. Physical Therapy:    []   Physical therapy will be discussed with you at your first follow-up appointment with Dr. Anabella Zamora. You don't need to begin physical therapy prior to that visit. You are to participate with [address] [place] [address] as arranged pre-operatively in the convience of your own home. [x]   Physical therapy will be discussed with you at your first follow-up appointment with Dr. Anabella Zamora.   You don't need to begin physical therapy prior to that visit. []   If you already have a therapy appointment, please be sure to attend your sessions as scheduled. If you do not have physical therapy scheduled, please call Dr. Gerhardt Heaps' office to set up your first appointment as soon as possible.    []   You do not require Physical Therapy. Important signs and symptoms:    If any of the following signs and symptoms occurs, you should contact Dr. Gerhardt Heaps' office. Please be advised if a problem arises which you feel required immediate medical attention or your are unable to contact Dr. Gerhardt Heaps' office you should seek immediate medical attention. Signs and symptoms to watch for include:  1. A sudden increase in swelling and/or redness or warmth at the area your surgery was performed which isn't relieved by rest, ice and elevation. 2. Oral temperature greater than 101.5 degrees for 12 hours or more which isn't relieved by an increase in fluid intake and taking two Tylenol every 4-6 hours. Do not exceed 3000 mg of Tylenol per day. 3. Excessive drainage from your incisions or drainage that hasn't stopped by 72 hours. 4. Calf pian, tenderness, redness or swelling which isn't relieved with rest and elevation. 5. Fever, chills, shortness of breath, chest pain, nausea, vomiting or other signs and symptoms which are of concern to you. DISCHARGE SUMMARY from Nurse    PATIENT INSTRUCTIONS:    After general anesthesia or intravenous sedation, for 24 hours or while taking prescription Narcotics:  · Limit your activities  · Do not drive and operate hazardous machinery  · Do not make important personal or business decisions  · Do  not drink alcoholic beverages  · If you have not urinated within 8 hours after discharge, please contact your surgeon on call.     Report the following to your surgeon:  · Excessive pain, swelling, redness or odor of or around the surgical area  · Temperature over 100.5  · Nausea and vomiting lasting longer than 4 hours or if unable to take medications  · Any signs of decreased circulation or nerve impairment to extremity: change in color, persistent  numbness, tingling, coldness or increase pain  · Any questions    What to do at Home:  Recommended activity: Activity as tolerated and no driving for today and Ambulate in house,     If you experience any of the following symptoms fever, chills or excessive bloody drainage please follow up with your physician. *  Please give a list of your current medications to your Primary Care Provider. *  Please update this list whenever your medications are discontinued, doses are      changed, or new medications (including over-the-counter products) are added. *  Please carry medication information at all times in case of emergency situations. These are general instructions for a healthy lifestyle:    No smoking/ No tobacco products/ Avoid exposure to second hand smoke  Surgeon General's Warning:  Quitting smoking now greatly reduces serious risk to your health. Obesity, smoking, and sedentary lifestyle greatly increases your risk for illness    A healthy diet, regular physical exercise & weight monitoring are important for maintaining a healthy lifestyle    You may be retaining fluid if you have a history of heart failure or if you experience any of the following symptoms:  Weight gain of 3 pounds or more overnight or 5 pounds in a week, increased swelling in our hands or feet or shortness of breath while lying flat in bed. Please call your doctor as soon as you notice any of these symptoms; do not wait until your next office visit. Recognize signs and symptoms of STROKE:    F-face looks uneven    A-arms unable to move or move unevenly    S-speech slurred or non-existent    T-time-call 911 as soon as signs and symptoms begin-DO NOT go       Back to bed or wait to see if you get better-TIME IS BRAIN.     Warning Signs of HEART ATTACK     Call 911 if you have these symptoms:   Chest discomfort. Most heart attacks involve discomfort in the center of the chest that lasts more than a few minutes, or that goes away and comes back. It can feel like uncomfortable pressure, squeezing, fullness, or pain.  Discomfort in other areas of the upper body. Symptoms can include pain or discomfort in one or both arms, the back, neck, jaw, or stomach.  Shortness of breath with or without chest discomfort.  Other signs may include breaking out in a cold sweat, nausea, or lightheadedness. Don't wait more than five minutes to call 911 - MINUTES MATTER! Fast action can save your life. Calling 911 is almost always the fastest way to get lifesaving treatment. Emergency Medical Services staff can begin treatment when they arrive -- up to an hour sooner than if someone gets to the hospital by car. Patient armband removed and shredded  The discharge information has been reviewed with the patient and guardian. The patient and caregiver verbalized understanding. Discharge medications reviewed with the patient and caregiver and appropriate educational materials and side effects teaching were provided.   ___________________________________________________________________________________________________________________________________

## 2017-12-21 NOTE — INTERVAL H&P NOTE
H&P Update:  Elisabeth Hardy was seen and examined. History and physical has been reviewed. The patient has been examined. There have been no significant clinical changes since the completion of the originally dated History and Physical.  Patient identified by surgeon; surgical site was confirmed by patient and surgeon.     Signed By: John Paul Cunha MD     December 21, 2017 8:32 AM

## 2017-12-21 NOTE — ANESTHESIA PREPROCEDURE EVALUATION
Anesthetic History   No history of anesthetic complications            Review of Systems / Medical History  Patient summary reviewed, nursing notes reviewed and pertinent labs reviewed    Pulmonary        Sleep apnea           Neuro/Psych   Within defined limits           Cardiovascular    Hypertension                   GI/Hepatic/Renal     GERD: poorly controlled           Endo/Other    Diabetes    Morbid obesity and arthritis     Other Findings              Physical Exam    Airway  Mallampati: IV  TM Distance: 4 - 6 cm  Neck ROM: normal range of motion   Mouth opening: Normal     Cardiovascular  Regular rate and rhythm,  S1 and S2 normal,  no murmur, click, rub, or gallop             Dental  No notable dental hx       Pulmonary  Breath sounds clear to auscultation               Abdominal  GI exam deferred       Other Findings            Anesthetic Plan    ASA: 3  Anesthesia type: general          Induction: Intravenous  Anesthetic plan and risks discussed with: Family and Patient

## 2019-01-29 ENCOUNTER — HOSPITAL ENCOUNTER (OUTPATIENT)
Dept: PREADMISSION TESTING | Age: 59
Discharge: HOME | End: 2019-01-29
Payer: COMMERCIAL

## 2019-01-29 VITALS — BODY MASS INDEX: 45.1 KG/M2 | WEIGHT: 315 LBS | HEIGHT: 70 IN

## 2019-01-29 LAB
ALBUMIN SERPL-MCNC: 4.2 G/DL (ref 3.4–5)
ALBUMIN/GLOB SERPL: 1.4 {RATIO} (ref 0.8–1.7)
ALP SERPL-CCNC: 60 U/L (ref 45–117)
ALT SERPL-CCNC: 93 U/L (ref 16–61)
ANION GAP SERPL CALC-SCNC: 7 MMOL/L (ref 3–18)
APPEARANCE UR: CLEAR
AST SERPL-CCNC: 46 U/L (ref 15–37)
ATRIAL RATE: 70 BPM
BACTERIA SPEC CULT: NORMAL
BILIRUB SERPL-MCNC: 0.7 MG/DL (ref 0.2–1)
BILIRUB UR QL: NEGATIVE
BUN SERPL-MCNC: 15 MG/DL (ref 7–18)
BUN/CREAT SERPL: 16 (ref 12–20)
CALCIUM SERPL-MCNC: 8.7 MG/DL (ref 8.5–10.1)
CALCULATED P AXIS, ECG09: 55 DEGREES
CALCULATED R AXIS, ECG10: 56 DEGREES
CALCULATED T AXIS, ECG11: 37 DEGREES
CHLORIDE SERPL-SCNC: 103 MMOL/L (ref 100–108)
CO2 SERPL-SCNC: 29 MMOL/L (ref 21–32)
COLOR UR: NORMAL
CREAT SERPL-MCNC: 0.94 MG/DL (ref 0.6–1.3)
DIAGNOSIS, 93000: NORMAL
ERYTHROCYTE [DISTWIDTH] IN BLOOD BY AUTOMATED COUNT: 12.9 % (ref 11.6–14.5)
EST. AVERAGE GLUCOSE BLD GHB EST-MCNC: 163 MG/DL
GLOBULIN SER CALC-MCNC: 3.1 G/DL (ref 2–4)
GLUCOSE SERPL-MCNC: 123 MG/DL (ref 74–99)
GLUCOSE UR STRIP.AUTO-MCNC: NEGATIVE MG/DL
HBA1C MFR BLD: 7.3 % (ref 4.2–5.6)
HCT VFR BLD AUTO: 43.2 % (ref 36–48)
HGB BLD-MCNC: 14.3 G/DL (ref 13–16)
HGB UR QL STRIP: NEGATIVE
KETONES UR QL STRIP.AUTO: NEGATIVE MG/DL
LEUKOCYTE ESTERASE UR QL STRIP.AUTO: NEGATIVE
MCH RBC QN AUTO: 31 PG (ref 24–34)
MCHC RBC AUTO-ENTMCNC: 33.1 G/DL (ref 31–37)
MCV RBC AUTO: 93.7 FL (ref 74–97)
NITRITE UR QL STRIP.AUTO: NEGATIVE
P-R INTERVAL, ECG05: 160 MS
PH UR STRIP: 5 [PH] (ref 5–8)
PLATELET # BLD AUTO: 258 K/UL (ref 135–420)
PMV BLD AUTO: 10 FL (ref 9.2–11.8)
POTASSIUM SERPL-SCNC: 4.2 MMOL/L (ref 3.5–5.5)
PROT SERPL-MCNC: 7.3 G/DL (ref 6.4–8.2)
PROT UR STRIP-MCNC: NEGATIVE MG/DL
Q-T INTERVAL, ECG07: 418 MS
QRS DURATION, ECG06: 98 MS
QTC CALCULATION (BEZET), ECG08: 451 MS
RBC # BLD AUTO: 4.61 M/UL (ref 4.7–5.5)
SERVICE CMNT-IMP: NORMAL
SODIUM SERPL-SCNC: 139 MMOL/L (ref 136–145)
SP GR UR REFRACTOMETRY: 1.03 (ref 1–1.03)
UROBILINOGEN UR QL STRIP.AUTO: 0.2 EU/DL (ref 0.2–1)
VENTRICULAR RATE, ECG03: 70 BPM
WBC # BLD AUTO: 8 K/UL (ref 4.6–13.2)

## 2019-01-29 PROCEDURE — 87086 URINE CULTURE/COLONY COUNT: CPT

## 2019-01-29 PROCEDURE — 81003 URINALYSIS AUTO W/O SCOPE: CPT

## 2019-01-29 PROCEDURE — 85027 COMPLETE CBC AUTOMATED: CPT

## 2019-01-29 PROCEDURE — 87641 MR-STAPH DNA AMP PROBE: CPT

## 2019-01-29 PROCEDURE — 80053 COMPREHEN METABOLIC PANEL: CPT

## 2019-01-29 PROCEDURE — 83036 HEMOGLOBIN GLYCOSYLATED A1C: CPT

## 2019-01-29 PROCEDURE — 93005 ELECTROCARDIOGRAM TRACING: CPT

## 2019-01-29 NOTE — PERIOP NOTES
No sleep apnea, sleep study, removable prosthetic devices or family history of malignant hyperthermia. Care fusion kit and instructions given and reviewed. PCP is aware of the surgery. No participation in clinical trial or research study. Does not meet criteria for special population at this time. Will try to attend Joint Replacement class 2-7-2019.

## 2019-01-31 LAB
BACTERIA SPEC CULT: NORMAL
SERVICE CMNT-IMP: NORMAL

## 2019-02-07 NOTE — ROUTINE PROCESS
Alex Humphreysmartha has decided with their surgeon to have a joint replacement to improve mobility and decrease pain. Joint Replacement Pre-Operative class was attended today. Topics discussed included surgery preparation, what to expect the day of surgery, medications (to include a multimodal approach to pain control and limiting narcotics), nutrition, glycemic control, respiratory therapy, physical and occupational therapy, and discharge planning. Discussed the importance of using these alternative pain management methods with the goal of using less opioid use after surgery and at home. A patient education notebook was provided and the opportunity was given to ask questions. The phone number of the Orthopaedic  was provided for any future questions or concerns.

## 2019-02-08 PROBLEM — M16.12 OSTEOARTHRITIS OF LEFT HIP: Chronic | Status: ACTIVE | Noted: 2019-02-08

## 2019-02-08 NOTE — H&P
History and Physical        Patient: Julia Lovell               Sex: male          DOA: (Not on file)         YOB: 1960      Age:  64 y.o.        LOS:  LOS: 0 days        HPI:     Sergio Marte is in for followup of his left knee mild DJD status post left knee arthroscopy by Dr. Caridad Hernandez one year ago with referred knee pain from his left moderate coxarthrosis/intraarticular cortisone injection proven. The patient is in for followup. The patient's left hip intraarticular injection we did made his left knee and his left hip feel great for about 24 hours and he had no pain whatsoever in either one. Within a couple of days it has gotten worse again and it has made the knee and hip worse. MRI of his left knee shows no new meniscal tear just the arthritic change that we saw at the time of the arthroscopy. AP, pelvic, and lateral views of the left hip were obtained and interpreted in the office and show moderate coxarthrosis. Otherwise, x-rays  reveal no periosteal reaction, no medullary lesions, no osteopenia, no chondrolysis, and no fracture. Past Medical History:   Diagnosis Date    Bursitis of right shoulder     Chronic pain     right shoulder and lower back.  Diabetes ([address] 2015    GERD (gastroesophageal reflux disease)     Hypercholesterolemia     Hypertension 2015    Morbid obesity ([facility]     1- BMI 46.1    OA (osteoarthritis)        Past Surgical History:   Procedure Laterality Date    HX GI      EGD, Colonoscopy    HX HEENT Bilateral     lasik eye surgery    HX KNEE ARTHROSCOPY Left 12/2017    HX ORTHOPAEDIC Left     CTR    HX OTHER SURGICAL      wisdom teeth extraction ,bone graft    HX OTHER SURGICAL         No family history on file.     Social History     Socioeconomic History    Marital status:      Spouse name: Not on file    Number of children: Not on file    Years of education: Not on file    Highest education level: Not on file   Tobacco Use    Smoking status: Current Some Day Smoker    Smokeless tobacco: Never Used    Tobacco comment: one cigar 3 x year   Substance and Sexual Activity    Alcohol use: Yes     Alcohol/week: 3.6 oz     Types: 2 Glasses of wine, 2 Cans of beer, 2 Shots of liquor per week    Drug use: No    Sexual activity: Yes     Partners: Female       Prior to Admission medications    Medication Sig Start Date End Date Taking? Authorizing Provider   amLODIPine (NORVASC) 5 mg tablet Take 5 mg by mouth daily. Provider, Historical   dulaglutide (TRULICITY) 1.5 OV/5.0 mL sub-q pen 1.5 mg by SubCUTAneous route every seven (7) days. Provider, Historical   fexofenadine (ALLEGRA) 180 mg tablet Take 180 mg by mouth daily. Provider, Historical   metFORMIN (GLUCOPHAGE) 1,000 mg tablet Take 1,000 mg by mouth two (2) times daily (with meals). Provider, Historical   ergocalciferol (ERGOCALCIFEROL) 50,000 unit capsule Take 50,000 Units by mouth every Monday. Provider, Historical   irbesartan (AVAPRO) 150 mg tablet Take 150 mg by mouth two (2) times a day. Provider, Historical   atorvastatin (LIPITOR) 20 mg tablet Take 20 mg by mouth nightly. Monday, Tuesday, Wednesday, Thursday, Friday     Provider, Historical   sildenafil citrate (VIAGRA) 100 mg tablet Take 100 mg by mouth as needed. Provider, Historical   zolpidem (AMBIEN) 5 mg tablet Take 5 mg by mouth nightly as needed for Sleep. Provider, Historical   HYDROcodone-acetaminophen (NORCO) 5-325 mg per tablet Take 1 Tab by mouth every four (4) hours as needed for Pain. Provider, Historical   metaxalone (SKELAXIN) 800 mg tablet Take 800 mg by mouth every six (6) hours as needed for Pain. Provider, Historical   amLODIPine (NORVASC) 2.5 mg tablet Take 2.5 mg by mouth every evening. Provider, Historical   carvedilol (COREG) 12.5 mg tablet Take 12.5 mg by mouth two (2) times a day. Provider, Historical   esomeprazole (NEXIUM) 20 mg capsule Take 20 mg by mouth daily. Provider, Historical       Allergies   Allergen Reactions    Bydureon [Exenatide Microspheres] Other (comments)     Scar tissue under skin       Review of Systems  A comprehensive review of systems was negative except for that written in the History of Present Illness. Physical Exam:      There were no vitals taken for this visit. Physical Exam:  On physical examination of the left hip, he has pain with palpation of the greater trochanteric bursal region and he has pain with USHA stretching. He has some pain with internal and external rotation radiating back to the groin. The skin is normal with no contusions or wounds. There are no restrictions of hip mobility on internal and external rotation. As the iliotibial band is stretched, there is no pain. The patient has normal light touch sensation in all dermatomal patterns. There is normal motor strength to heel and toe walking. There is negative straight leg raising bilaterally to 90 degrees in the seated position. The patient has good capillary refill. Assessment/Plan     Principal Problem:    Osteoarthritis of left hip (2/8/2019)    Active Problems:    High cholesterol (12/18/2017)      HTN (hypertension) (12/18/2017)      Dr. Dane Bai discussed progression to left direct anterior KATHERINE. We would do this as an outpatient procedure. We discussed the risks including infection, pain, bleeding and DVT and he understands these risks. He wants to talk with his family and figure it out. Dr. 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 He is going to go to [facility] in the next three to four days, driving there and back. He will stay with his NSAIDs and followup with me once he has discussed this with his family so we can plan our procedure. The patient did call back and was scheduled.   He will follow up with Dr. Dane Bai 10 days post op.

## 2019-02-13 ENCOUNTER — ANESTHESIA EVENT (OUTPATIENT)
Dept: SURGERY | Age: 59
DRG: 470 | End: 2019-02-13
Payer: COMMERCIAL

## 2019-02-13 ENCOUNTER — HOSPITAL ENCOUNTER (INPATIENT)
Age: 59
LOS: 1 days | Discharge: HOME HEALTH | DRG: 470 | End: 2019-02-14
Attending: ORTHOPAEDIC SURGERY | Admitting: ORTHOPAEDIC SURGERY
Payer: COMMERCIAL

## 2019-02-13 ENCOUNTER — ANESTHESIA (OUTPATIENT)
Dept: SURGERY | Age: 59
DRG: 470 | End: 2019-02-13
Payer: COMMERCIAL

## 2019-02-13 ENCOUNTER — APPOINTMENT (OUTPATIENT)
Dept: GENERAL RADIOLOGY | Age: 59
DRG: 470 | End: 2019-02-13
Attending: PHYSICIAN ASSISTANT
Payer: COMMERCIAL

## 2019-02-13 DIAGNOSIS — M16.12 PRIMARY OSTEOARTHRITIS OF LEFT HIP: Primary | Chronic | ICD-10-CM

## 2019-02-13 LAB
ABO + RH BLD: NORMAL
BLOOD GROUP ANTIBODIES SERPL: NORMAL
GLUCOSE BLD STRIP.AUTO-MCNC: 146 MG/DL (ref 70–110)
GLUCOSE BLD STRIP.AUTO-MCNC: 181 MG/DL (ref 70–110)
GLUCOSE BLD STRIP.AUTO-MCNC: 206 MG/DL (ref 70–110)
GLUCOSE BLD STRIP.AUTO-MCNC: 238 MG/DL (ref 70–110)
SPECIMEN EXP DATE BLD: NORMAL

## 2019-02-13 PROCEDURE — 97161 PT EVAL LOW COMPLEX 20 MIN: CPT

## 2019-02-13 PROCEDURE — 76060000034 HC ANESTHESIA 1.5 TO 2 HR: Performed by: ORTHOPAEDIC SURGERY

## 2019-02-13 PROCEDURE — 73501 X-RAY EXAM HIP UNI 1 VIEW: CPT

## 2019-02-13 PROCEDURE — 74011250636 HC RX REV CODE- 250/636

## 2019-02-13 PROCEDURE — 74011250637 HC RX REV CODE- 250/637: Performed by: SPECIALIST

## 2019-02-13 PROCEDURE — 97116 GAIT TRAINING THERAPY: CPT

## 2019-02-13 PROCEDURE — 77030032490 HC SLV COMPR SCD KNE COVD -B: Performed by: ORTHOPAEDIC SURGERY

## 2019-02-13 PROCEDURE — 74011636637 HC RX REV CODE- 636/637: Performed by: SPECIALIST

## 2019-02-13 PROCEDURE — 74011250636 HC RX REV CODE- 250/636: Performed by: PHYSICIAN ASSISTANT

## 2019-02-13 PROCEDURE — 77030018836 HC SOL IRR NACL ICUM -A: Performed by: ORTHOPAEDIC SURGERY

## 2019-02-13 PROCEDURE — 74011000250 HC RX REV CODE- 250: Performed by: ORTHOPAEDIC SURGERY

## 2019-02-13 PROCEDURE — 77010033678 HC OXYGEN DAILY

## 2019-02-13 PROCEDURE — 77030031139 HC SUT VCRL2 J&J -A: Performed by: ORTHOPAEDIC SURGERY

## 2019-02-13 PROCEDURE — 77030039266 HC ADH SKN EXOFIN S2SG -A: Performed by: ORTHOPAEDIC SURGERY

## 2019-02-13 PROCEDURE — 74011000250 HC RX REV CODE- 250

## 2019-02-13 PROCEDURE — 74011000258 HC RX REV CODE- 258: Performed by: ORTHOPAEDIC SURGERY

## 2019-02-13 PROCEDURE — 74011250636 HC RX REV CODE- 250/636: Performed by: SPECIALIST

## 2019-02-13 PROCEDURE — 76010000153 HC OR TIME 1.5 TO 2 HR: Performed by: ORTHOPAEDIC SURGERY

## 2019-02-13 PROCEDURE — 74011250637 HC RX REV CODE- 250/637: Performed by: PHYSICIAN ASSISTANT

## 2019-02-13 PROCEDURE — 65270000029 HC RM PRIVATE

## 2019-02-13 PROCEDURE — 77030039267 HC ADH SKN EXOFIN S2SG -B: Performed by: ORTHOPAEDIC SURGERY

## 2019-02-13 PROCEDURE — 77030037875 HC DRSG MEPILEX <16IN BORD MOLN -A: Performed by: ORTHOPAEDIC SURGERY

## 2019-02-13 PROCEDURE — 74011636637 HC RX REV CODE- 636/637: Performed by: PHYSICIAN ASSISTANT

## 2019-02-13 PROCEDURE — 77030038010: Performed by: ORTHOPAEDIC SURGERY

## 2019-02-13 PROCEDURE — 77030027138 HC INCENT SPIROMETER -A: Performed by: ORTHOPAEDIC SURGERY

## 2019-02-13 PROCEDURE — 82962 GLUCOSE BLOOD TEST: CPT

## 2019-02-13 PROCEDURE — 36415 COLL VENOUS BLD VENIPUNCTURE: CPT

## 2019-02-13 PROCEDURE — 0SRB04A REPLACEMENT OF LEFT HIP JOINT WITH CERAMIC ON POLYETHYLENE SYNTHETIC SUBSTITUTE, UNCEMENTED, OPEN APPROACH: ICD-10-PCS | Performed by: ORTHOPAEDIC SURGERY

## 2019-02-13 PROCEDURE — 77030034694 HC SCPL CANADY PLSM DISP USMD -E: Performed by: ORTHOPAEDIC SURGERY

## 2019-02-13 PROCEDURE — 77030020782 HC GWN BAIR PAWS FLX 3M -B: Performed by: ORTHOPAEDIC SURGERY

## 2019-02-13 PROCEDURE — 77030012508 HC MSK AIRWY LMA AMBU -A: Performed by: SPECIALIST

## 2019-02-13 PROCEDURE — 77030013708 HC HNDPC SUC IRR PULS STRY –B: Performed by: ORTHOPAEDIC SURGERY

## 2019-02-13 PROCEDURE — 77030018673: Performed by: ORTHOPAEDIC SURGERY

## 2019-02-13 PROCEDURE — C1776 JOINT DEVICE (IMPLANTABLE): HCPCS | Performed by: ORTHOPAEDIC SURGERY

## 2019-02-13 PROCEDURE — 74011250636 HC RX REV CODE- 250/636: Performed by: ORTHOPAEDIC SURGERY

## 2019-02-13 PROCEDURE — 76210000006 HC OR PH I REC 0.5 TO 1 HR: Performed by: ORTHOPAEDIC SURGERY

## 2019-02-13 PROCEDURE — 86900 BLOOD TYPING SEROLOGIC ABO: CPT

## 2019-02-13 DEVICE — CUP ACET DIA52MM HIP GRIPTION PRI CEMENTLESS FIX SECT SER: Type: IMPLANTABLE DEVICE | Site: HIP | Status: FUNCTIONAL

## 2019-02-13 DEVICE — STEM FEM SZ 7 L109MM 12/14 TAPR STD OFFSET HIP DUOFIX CLLRD: Type: IMPLANTABLE DEVICE | Site: HIP | Status: FUNCTIONAL

## 2019-02-13 DEVICE — LINER ACET OD52MM ID36MM HIP ALTRX PINN: Type: IMPLANTABLE DEVICE | Site: HIP | Status: FUNCTIONAL

## 2019-02-13 DEVICE — COMPONENT TOT HIP PRIMARY CERM ALTRX: Type: IMPLANTABLE DEVICE | Site: HIP | Status: FUNCTIONAL

## 2019-02-13 DEVICE — ELIMINATOR H APEX FOR 48-60MM PINN HIP SHELL: Type: IMPLANTABLE DEVICE | Site: HIP | Status: FUNCTIONAL

## 2019-02-13 DEVICE — HEAD FEM DIA36MM +1.5MM OFFSET 12/14 TAPR HIP CERAMIC: Type: IMPLANTABLE DEVICE | Site: HIP | Status: FUNCTIONAL

## 2019-02-13 RX ORDER — FACIAL-BODY WIPES
10 EACH TOPICAL DAILY PRN
Status: DISCONTINUED | OUTPATIENT
Start: 2019-02-13 | End: 2019-02-14 | Stop reason: HOSPADM

## 2019-02-13 RX ORDER — EPHEDRINE SULFATE/0.9% NACL/PF 25 MG/5 ML
SYRINGE (ML) INTRAVENOUS AS NEEDED
Status: DISCONTINUED | OUTPATIENT
Start: 2019-02-13 | End: 2019-02-13 | Stop reason: HOSPADM

## 2019-02-13 RX ORDER — FENTANYL CITRATE 50 UG/ML
INJECTION, SOLUTION INTRAMUSCULAR; INTRAVENOUS AS NEEDED
Status: DISCONTINUED | OUTPATIENT
Start: 2019-02-13 | End: 2019-02-13 | Stop reason: HOSPADM

## 2019-02-13 RX ORDER — SODIUM CHLORIDE 0.9 % (FLUSH) 0.9 %
5-40 SYRINGE (ML) INJECTION AS NEEDED
Status: DISCONTINUED | OUTPATIENT
Start: 2019-02-13 | End: 2019-02-14 | Stop reason: HOSPADM

## 2019-02-13 RX ORDER — KETOROLAC TROMETHAMINE 15 MG/ML
15 INJECTION, SOLUTION INTRAMUSCULAR; INTRAVENOUS
Status: DISCONTINUED | OUTPATIENT
Start: 2019-02-13 | End: 2019-02-14 | Stop reason: HOSPADM

## 2019-02-13 RX ORDER — ZOLPIDEM TARTRATE 5 MG/1
5 TABLET ORAL
Status: DISCONTINUED | OUTPATIENT
Start: 2019-02-13 | End: 2019-02-14 | Stop reason: HOSPADM

## 2019-02-13 RX ORDER — PANTOPRAZOLE SODIUM 40 MG/1
40 TABLET, DELAYED RELEASE ORAL ONCE
Status: DISCONTINUED | OUTPATIENT
Start: 2019-02-13 | End: 2019-02-13 | Stop reason: HOSPADM

## 2019-02-13 RX ORDER — CARVEDILOL 12.5 MG/1
12.5 TABLET ORAL 2 TIMES DAILY
Status: DISCONTINUED | OUTPATIENT
Start: 2019-02-13 | End: 2019-02-14 | Stop reason: HOSPADM

## 2019-02-13 RX ORDER — ACETAMINOPHEN 500 MG
1000 TABLET ORAL ONCE
Status: COMPLETED | OUTPATIENT
Start: 2019-02-13 | End: 2019-02-13

## 2019-02-13 RX ORDER — MAGNESIUM SULFATE 100 %
4 CRYSTALS MISCELLANEOUS AS NEEDED
Status: DISCONTINUED | OUTPATIENT
Start: 2019-02-13 | End: 2019-02-14 | Stop reason: HOSPADM

## 2019-02-13 RX ORDER — SODIUM CHLORIDE 0.9 % (FLUSH) 0.9 %
5-40 SYRINGE (ML) INJECTION EVERY 8 HOURS
Status: CANCELLED | OUTPATIENT
Start: 2019-02-13

## 2019-02-13 RX ORDER — SODIUM CHLORIDE, SODIUM LACTATE, POTASSIUM CHLORIDE, CALCIUM CHLORIDE 600; 310; 30; 20 MG/100ML; MG/100ML; MG/100ML; MG/100ML
75 INJECTION, SOLUTION INTRAVENOUS CONTINUOUS
Status: DISCONTINUED | OUTPATIENT
Start: 2019-02-13 | End: 2019-02-14 | Stop reason: HOSPADM

## 2019-02-13 RX ORDER — INSULIN LISPRO 100 [IU]/ML
INJECTION, SOLUTION INTRAVENOUS; SUBCUTANEOUS ONCE
Status: COMPLETED | OUTPATIENT
Start: 2019-02-13 | End: 2019-02-13

## 2019-02-13 RX ORDER — LANOLIN ALCOHOL/MO/W.PET/CERES
1 CREAM (GRAM) TOPICAL 3 TIMES DAILY
Status: DISCONTINUED | OUTPATIENT
Start: 2019-02-13 | End: 2019-02-14 | Stop reason: HOSPADM

## 2019-02-13 RX ORDER — MIDAZOLAM HYDROCHLORIDE 1 MG/ML
INJECTION, SOLUTION INTRAMUSCULAR; INTRAVENOUS AS NEEDED
Status: DISCONTINUED | OUTPATIENT
Start: 2019-02-13 | End: 2019-02-13 | Stop reason: HOSPADM

## 2019-02-13 RX ORDER — PANTOPRAZOLE SODIUM 40 MG/1
40 TABLET, DELAYED RELEASE ORAL DAILY
Status: DISCONTINUED | OUTPATIENT
Start: 2019-02-14 | End: 2019-02-14 | Stop reason: HOSPADM

## 2019-02-13 RX ORDER — ASPIRIN 81 MG/1
81 TABLET ORAL 2 TIMES DAILY
Status: DISCONTINUED | OUTPATIENT
Start: 2019-02-13 | End: 2019-02-14 | Stop reason: HOSPADM

## 2019-02-13 RX ORDER — METAXALONE 800 MG/1
800 TABLET ORAL
Status: DISCONTINUED | OUTPATIENT
Start: 2019-02-13 | End: 2019-02-14 | Stop reason: HOSPADM

## 2019-02-13 RX ORDER — AMLODIPINE BESYLATE 5 MG/1
5 TABLET ORAL DAILY
Status: DISCONTINUED | OUTPATIENT
Start: 2019-02-14 | End: 2019-02-14 | Stop reason: HOSPADM

## 2019-02-13 RX ORDER — IRBESARTAN 150 MG/1
150 TABLET ORAL 2 TIMES DAILY
Status: DISCONTINUED | OUTPATIENT
Start: 2019-02-13 | End: 2019-02-14 | Stop reason: HOSPADM

## 2019-02-13 RX ORDER — SODIUM CHLORIDE 0.9 % (FLUSH) 0.9 %
5-40 SYRINGE (ML) INJECTION EVERY 8 HOURS
Status: DISCONTINUED | OUTPATIENT
Start: 2019-02-13 | End: 2019-02-13 | Stop reason: HOSPADM

## 2019-02-13 RX ORDER — NALOXONE HYDROCHLORIDE 0.4 MG/ML
0.1 INJECTION, SOLUTION INTRAMUSCULAR; INTRAVENOUS; SUBCUTANEOUS
Status: DISCONTINUED | OUTPATIENT
Start: 2019-02-13 | End: 2019-02-13 | Stop reason: HOSPADM

## 2019-02-13 RX ORDER — ACETAMINOPHEN 500 MG
1000 TABLET ORAL ONCE
Status: CANCELLED | OUTPATIENT
Start: 2019-02-13 | End: 2019-02-13

## 2019-02-13 RX ORDER — SODIUM CHLORIDE, SODIUM LACTATE, POTASSIUM CHLORIDE, CALCIUM CHLORIDE 600; 310; 30; 20 MG/100ML; MG/100ML; MG/100ML; MG/100ML
50 INJECTION, SOLUTION INTRAVENOUS CONTINUOUS
Status: DISCONTINUED | OUTPATIENT
Start: 2019-02-13 | End: 2019-02-13 | Stop reason: HOSPADM

## 2019-02-13 RX ORDER — SODIUM CHLORIDE 0.9 % (FLUSH) 0.9 %
5-40 SYRINGE (ML) INJECTION AS NEEDED
Status: CANCELLED | OUTPATIENT
Start: 2019-02-13

## 2019-02-13 RX ORDER — PROPOFOL 10 MG/ML
INJECTION, EMULSION INTRAVENOUS AS NEEDED
Status: DISCONTINUED | OUTPATIENT
Start: 2019-02-13 | End: 2019-02-13 | Stop reason: HOSPADM

## 2019-02-13 RX ORDER — ONDANSETRON 4 MG/1
4 TABLET, ORALLY DISINTEGRATING ORAL
Status: DISCONTINUED | OUTPATIENT
Start: 2019-02-13 | End: 2019-02-14 | Stop reason: HOSPADM

## 2019-02-13 RX ORDER — SODIUM CHLORIDE 0.9 % (FLUSH) 0.9 %
5-40 SYRINGE (ML) INJECTION EVERY 8 HOURS
Status: DISCONTINUED | OUTPATIENT
Start: 2019-02-13 | End: 2019-02-14 | Stop reason: HOSPADM

## 2019-02-13 RX ORDER — TRANEXAMIC ACID 650 1/1
1950 TABLET ORAL ONCE
Status: COMPLETED | OUTPATIENT
Start: 2019-02-13 | End: 2019-02-13

## 2019-02-13 RX ORDER — HYDROMORPHONE HYDROCHLORIDE 2 MG/ML
0.5 INJECTION, SOLUTION INTRAMUSCULAR; INTRAVENOUS; SUBCUTANEOUS
Status: DISCONTINUED | OUTPATIENT
Start: 2019-02-13 | End: 2019-02-13 | Stop reason: HOSPADM

## 2019-02-13 RX ORDER — SODIUM CHLORIDE, SODIUM LACTATE, POTASSIUM CHLORIDE, CALCIUM CHLORIDE 600; 310; 30; 20 MG/100ML; MG/100ML; MG/100ML; MG/100ML
125 INJECTION, SOLUTION INTRAVENOUS CONTINUOUS
Status: CANCELLED | OUTPATIENT
Start: 2019-02-13 | End: 2019-02-14

## 2019-02-13 RX ORDER — GLYCOPYRROLATE 0.2 MG/ML
INJECTION INTRAMUSCULAR; INTRAVENOUS AS NEEDED
Status: DISCONTINUED | OUTPATIENT
Start: 2019-02-13 | End: 2019-02-13 | Stop reason: HOSPADM

## 2019-02-13 RX ORDER — LIDOCAINE HYDROCHLORIDE 20 MG/ML
INJECTION, SOLUTION EPIDURAL; INFILTRATION; INTRACAUDAL; PERINEURAL AS NEEDED
Status: DISCONTINUED | OUTPATIENT
Start: 2019-02-13 | End: 2019-02-13 | Stop reason: HOSPADM

## 2019-02-13 RX ORDER — DEXTROSE 50 % IN WATER (D50W) INTRAVENOUS SYRINGE
25-50 AS NEEDED
Status: DISCONTINUED | OUTPATIENT
Start: 2019-02-13 | End: 2019-02-14 | Stop reason: HOSPADM

## 2019-02-13 RX ORDER — FENTANYL CITRATE 50 UG/ML
25 INJECTION, SOLUTION INTRAMUSCULAR; INTRAVENOUS
Status: DISCONTINUED | OUTPATIENT
Start: 2019-02-13 | End: 2019-02-13 | Stop reason: HOSPADM

## 2019-02-13 RX ORDER — DEXTROSE 50 % IN WATER (D50W) INTRAVENOUS SYRINGE
25-50 AS NEEDED
Status: DISCONTINUED | OUTPATIENT
Start: 2019-02-13 | End: 2019-02-13 | Stop reason: HOSPADM

## 2019-02-13 RX ORDER — ONDANSETRON 2 MG/ML
4 INJECTION INTRAMUSCULAR; INTRAVENOUS ONCE
Status: DISCONTINUED | OUTPATIENT
Start: 2019-02-13 | End: 2019-02-13 | Stop reason: HOSPADM

## 2019-02-13 RX ORDER — AMLODIPINE BESYLATE 2.5 MG/1
2.5 TABLET ORAL EVERY EVENING
Status: DISCONTINUED | OUTPATIENT
Start: 2019-02-13 | End: 2019-02-14 | Stop reason: HOSPADM

## 2019-02-13 RX ORDER — INSULIN LISPRO 100 [IU]/ML
INJECTION, SOLUTION INTRAVENOUS; SUBCUTANEOUS
Status: DISCONTINUED | OUTPATIENT
Start: 2019-02-13 | End: 2019-02-14 | Stop reason: HOSPADM

## 2019-02-13 RX ORDER — PREGABALIN 50 MG/1
50 CAPSULE ORAL
Status: COMPLETED | OUTPATIENT
Start: 2019-02-13 | End: 2019-02-13

## 2019-02-13 RX ORDER — SODIUM CHLORIDE, SODIUM LACTATE, POTASSIUM CHLORIDE, CALCIUM CHLORIDE 600; 310; 30; 20 MG/100ML; MG/100ML; MG/100ML; MG/100ML
125 INJECTION, SOLUTION INTRAVENOUS CONTINUOUS
Status: DISCONTINUED | OUTPATIENT
Start: 2019-02-13 | End: 2019-02-13 | Stop reason: HOSPADM

## 2019-02-13 RX ORDER — ERGOCALCIFEROL 1.25 MG/1
50000 CAPSULE ORAL
Status: DISCONTINUED | OUTPATIENT
Start: 2019-02-18 | End: 2019-02-14 | Stop reason: HOSPADM

## 2019-02-13 RX ORDER — ATORVASTATIN CALCIUM 20 MG/1
20 TABLET, FILM COATED ORAL
Status: DISCONTINUED | OUTPATIENT
Start: 2019-02-13 | End: 2019-02-14 | Stop reason: HOSPADM

## 2019-02-13 RX ORDER — LORATADINE 10 MG/1
10 TABLET ORAL DAILY
Status: DISCONTINUED | OUTPATIENT
Start: 2019-02-14 | End: 2019-02-14 | Stop reason: HOSPADM

## 2019-02-13 RX ORDER — DEXAMETHASONE SODIUM PHOSPHATE 4 MG/ML
4 INJECTION, SOLUTION INTRA-ARTICULAR; INTRALESIONAL; INTRAMUSCULAR; INTRAVENOUS; SOFT TISSUE ONCE
Status: COMPLETED | OUTPATIENT
Start: 2019-02-13 | End: 2019-02-13

## 2019-02-13 RX ORDER — ACETAMINOPHEN 325 MG/1
650 TABLET ORAL EVERY 6 HOURS
Status: DISCONTINUED | OUTPATIENT
Start: 2019-02-13 | End: 2019-02-14 | Stop reason: HOSPADM

## 2019-02-13 RX ORDER — SODIUM CHLORIDE 0.9 % (FLUSH) 0.9 %
5-40 SYRINGE (ML) INJECTION AS NEEDED
Status: DISCONTINUED | OUTPATIENT
Start: 2019-02-13 | End: 2019-02-13 | Stop reason: HOSPADM

## 2019-02-13 RX ORDER — NALOXONE HYDROCHLORIDE 0.4 MG/ML
0.4 INJECTION, SOLUTION INTRAMUSCULAR; INTRAVENOUS; SUBCUTANEOUS AS NEEDED
Status: DISCONTINUED | OUTPATIENT
Start: 2019-02-13 | End: 2019-02-14 | Stop reason: HOSPADM

## 2019-02-13 RX ORDER — ONDANSETRON 2 MG/ML
INJECTION INTRAMUSCULAR; INTRAVENOUS AS NEEDED
Status: DISCONTINUED | OUTPATIENT
Start: 2019-02-13 | End: 2019-02-13 | Stop reason: HOSPADM

## 2019-02-13 RX ORDER — KETAMINE HYDROCHLORIDE 10 MG/ML
INJECTION, SOLUTION INTRAMUSCULAR; INTRAVENOUS AS NEEDED
Status: DISCONTINUED | OUTPATIENT
Start: 2019-02-13 | End: 2019-02-13 | Stop reason: HOSPADM

## 2019-02-13 RX ORDER — MAGNESIUM SULFATE 100 %
4 CRYSTALS MISCELLANEOUS AS NEEDED
Status: DISCONTINUED | OUTPATIENT
Start: 2019-02-13 | End: 2019-02-13 | Stop reason: HOSPADM

## 2019-02-13 RX ORDER — OXYCODONE HYDROCHLORIDE 5 MG/1
5 TABLET ORAL
Status: DISCONTINUED | OUTPATIENT
Start: 2019-02-13 | End: 2019-02-14

## 2019-02-13 RX ORDER — CELECOXIB 100 MG/1
200 CAPSULE ORAL
Status: COMPLETED | OUTPATIENT
Start: 2019-02-13 | End: 2019-02-13

## 2019-02-13 RX ORDER — DIPHENHYDRAMINE HCL 25 MG
25 CAPSULE ORAL
Status: DISCONTINUED | OUTPATIENT
Start: 2019-02-13 | End: 2019-02-14 | Stop reason: HOSPADM

## 2019-02-13 RX ADMIN — DEXAMETHASONE SODIUM PHOSPHATE 4 MG: 4 INJECTION, SOLUTION INTRAMUSCULAR; INTRAVENOUS at 07:41

## 2019-02-13 RX ADMIN — GLYCOPYRROLATE 0.2 MG: 0.2 INJECTION INTRAMUSCULAR; INTRAVENOUS at 08:48

## 2019-02-13 RX ADMIN — MIDAZOLAM HYDROCHLORIDE 2 MG: 1 INJECTION, SOLUTION INTRAMUSCULAR; INTRAVENOUS at 08:43

## 2019-02-13 RX ADMIN — OXYCODONE HYDROCHLORIDE 5 MG: 5 TABLET ORAL at 13:24

## 2019-02-13 RX ADMIN — Medication 10 MG: at 09:56

## 2019-02-13 RX ADMIN — LIDOCAINE HYDROCHLORIDE 100 MG: 20 INJECTION, SOLUTION EPIDURAL; INFILTRATION; INTRACAUDAL; PERINEURAL at 08:48

## 2019-02-13 RX ADMIN — AMLODIPINE BESYLATE 2.5 MG: 2.5 TABLET ORAL at 17:10

## 2019-02-13 RX ADMIN — FENTANYL CITRATE 25 MCG: 50 INJECTION, SOLUTION INTRAMUSCULAR; INTRAVENOUS at 10:57

## 2019-02-13 RX ADMIN — FENTANYL CITRATE 25 MCG: 50 INJECTION, SOLUTION INTRAMUSCULAR; INTRAVENOUS at 10:46

## 2019-02-13 RX ADMIN — CELECOXIB 200 MG: 100 CAPSULE ORAL at 07:41

## 2019-02-13 RX ADMIN — SODIUM CHLORIDE, SODIUM LACTATE, POTASSIUM CHLORIDE, AND CALCIUM CHLORIDE 125 ML/HR: 600; 310; 30; 20 INJECTION, SOLUTION INTRAVENOUS at 07:40

## 2019-02-13 RX ADMIN — OXYCODONE HYDROCHLORIDE 5 MG: 5 TABLET ORAL at 18:32

## 2019-02-13 RX ADMIN — FENTANYL CITRATE 50 MCG: 50 INJECTION, SOLUTION INTRAMUSCULAR; INTRAVENOUS at 08:48

## 2019-02-13 RX ADMIN — CARVEDILOL 12.5 MG: 12.5 TABLET, FILM COATED ORAL at 20:22

## 2019-02-13 RX ADMIN — KETAMINE HYDROCHLORIDE 30 MG: 10 INJECTION, SOLUTION INTRAMUSCULAR; INTRAVENOUS at 08:59

## 2019-02-13 RX ADMIN — FENTANYL CITRATE 25 MCG: 50 INJECTION, SOLUTION INTRAMUSCULAR; INTRAVENOUS at 11:08

## 2019-02-13 RX ADMIN — TRANEXAMIC ACID 1950 MG: 650 TABLET ORAL at 07:35

## 2019-02-13 RX ADMIN — INSULIN LISPRO 4 UNITS: 100 INJECTION, SOLUTION INTRAVENOUS; SUBCUTANEOUS at 21:52

## 2019-02-13 RX ADMIN — ACETAMINOPHEN 650 MG: 325 TABLET, FILM COATED ORAL at 22:51

## 2019-02-13 RX ADMIN — KETAMINE HYDROCHLORIDE 20 MG: 10 INJECTION, SOLUTION INTRAMUSCULAR; INTRAVENOUS at 09:24

## 2019-02-13 RX ADMIN — ACETAMINOPHEN 650 MG: 325 TABLET, FILM COATED ORAL at 17:10

## 2019-02-13 RX ADMIN — SODIUM CHLORIDE, SODIUM LACTATE, POTASSIUM CHLORIDE, AND CALCIUM CHLORIDE 1000 ML: 600; 310; 30; 20 INJECTION, SOLUTION INTRAVENOUS at 07:41

## 2019-02-13 RX ADMIN — Medication 10 MG: at 09:05

## 2019-02-13 RX ADMIN — PREGABALIN 50 MG: 50 CAPSULE ORAL at 07:41

## 2019-02-13 RX ADMIN — ZOLPIDEM TARTRATE 5 MG: 5 TABLET ORAL at 22:51

## 2019-02-13 RX ADMIN — ASPIRIN 81 MG: 81 TABLET ORAL at 20:22

## 2019-02-13 RX ADMIN — ONDANSETRON 4 MG: 2 INJECTION INTRAMUSCULAR; INTRAVENOUS at 08:58

## 2019-02-13 RX ADMIN — SODIUM CHLORIDE, SODIUM LACTATE, POTASSIUM CHLORIDE, AND CALCIUM CHLORIDE 75 ML/HR: 600; 310; 30; 20 INJECTION, SOLUTION INTRAVENOUS at 13:25

## 2019-02-13 RX ADMIN — INSULIN LISPRO 4 UNITS: 100 INJECTION, SOLUTION INTRAVENOUS; SUBCUTANEOUS at 17:10

## 2019-02-13 RX ADMIN — CEFAZOLIN SODIUM 3 G: 10 INJECTION, POWDER, FOR SOLUTION INTRAVENOUS at 09:00

## 2019-02-13 RX ADMIN — CEFAZOLIN SODIUM 3 G: 10 INJECTION, POWDER, FOR SOLUTION INTRAVENOUS at 17:10

## 2019-02-13 RX ADMIN — OXYCODONE HYDROCHLORIDE 5 MG: 5 TABLET ORAL at 22:51

## 2019-02-13 RX ADMIN — PROPOFOL 200 MG: 10 INJECTION, EMULSION INTRAVENOUS at 08:49

## 2019-02-13 RX ADMIN — Medication 10 MG: at 09:14

## 2019-02-13 RX ADMIN — ACETAMINOPHEN 1000 MG: 500 TABLET, FILM COATED ORAL at 07:41

## 2019-02-13 RX ADMIN — ATORVASTATIN CALCIUM 20 MG: 20 TABLET, FILM COATED ORAL at 21:52

## 2019-02-13 RX ADMIN — INSULIN LISPRO 2 UNITS: 100 INJECTION, SOLUTION INTRAVENOUS; SUBCUTANEOUS at 10:47

## 2019-02-13 RX ADMIN — FENTANYL CITRATE 25 MCG: 50 INJECTION, SOLUTION INTRAMUSCULAR; INTRAVENOUS at 09:41

## 2019-02-13 RX ADMIN — FERROUS SULFATE TAB 325 MG (65 MG ELEMENTAL FE) 325 MG: 325 (65 FE) TAB at 17:10

## 2019-02-13 RX ADMIN — FENTANYL CITRATE 25 MCG: 50 INJECTION, SOLUTION INTRAMUSCULAR; INTRAVENOUS at 09:24

## 2019-02-13 RX ADMIN — SODIUM CHLORIDE, SODIUM LACTATE, POTASSIUM CHLORIDE, AND CALCIUM CHLORIDE: 600; 310; 30; 20 INJECTION, SOLUTION INTRAVENOUS at 08:48

## 2019-02-13 RX ADMIN — ACETAMINOPHEN 650 MG: 325 TABLET, FILM COATED ORAL at 13:23

## 2019-02-13 RX ADMIN — SODIUM CHLORIDE, SODIUM LACTATE, POTASSIUM CHLORIDE, AND CALCIUM CHLORIDE: 600; 310; 30; 20 INJECTION, SOLUTION INTRAVENOUS at 09:41

## 2019-02-13 RX ADMIN — Medication 10 MG: at 09:02

## 2019-02-13 RX ADMIN — IRBESARTAN 150 MG: 150 TABLET ORAL at 20:22

## 2019-02-13 NOTE — INTERVAL H&P NOTE
H&P Update:  Vicky Martinez was seen and examined. History and physical has been reviewed. The patient has been examined. There have been no significant clinical changes since the completion of the originally dated History and Physical.  Patient identified by surgeon; surgical site was confirmed by patient and surgeon.     Signed By: Samuel Padron MD     February 13, 2019 7:44 AM

## 2019-02-13 NOTE — PROGRESS NOTES
Transferred Pt to Guilherme Klein RN at bedside. Dressing CDI. Left pt stable.  Dual skin assessment complete     02/13/19 1134   Vital Signs   Temp 97.8 °F (36.6 °C)   Temp Source Oral   Pulse (Heart Rate) 80   Resp Rate 16   /71   Oxygen Therapy   O2 Sat (%) 98 %   O2 Device Nasal cannula   O2 Flow Rate (L/min) 2 l/min

## 2019-02-13 NOTE — PROGRESS NOTES
Problem: Mobility Impaired (Adult and Pediatric)  Goal: *Acute Goals and Plan of Care (Insert Text)  In 1-7 days pt will be able to perform:  ST.  Bed mobility:  Rolling L to R to L modified independent for positioning. 2.  Supine to sit to supine S with HR for meals. 3.  Sit to stand to sit S with RW in prep for ambulation. LT.  Gait:  Ambulate >150ft S with RW, WBAT, for home/community mobility. 2.  Stair Negotiation:  Ascend/descend >4 steps CGA with HR for home entry. 3.  Activity tolerance: Tolerate up in chair 1-2 hours for ADLs. 4.  Patient/Family Education:  Patient/family to be independent with HEP for follow-up care and safe discharge. physical Therapy EVALUATION    Patient: Jordyn Gilbert (60 y.o. male)  Date: 2019  Primary Diagnosis: Osteoarthritis of left hip [M16.12]  Osteoarthritis of left hip [M16.12]  Osteoarthritis of left hip [M16.12]  Procedure(s) (LRB):  LEFT TOTAL HIP ARTHROPLASTY ANTERIOR APPROACH WITH NAVIGATION (Left) Day of Surgery   Precautions:   Fall, WBAT    ASSESSMENT :  Based on the objective data described below, the patient presents with decreased functional mobility and independence in regard to bed mobility, transfers, gt quality and tolerance, L hip AROM, L hip strength, pain, balance, activity tolerance, stair negotiation and safety due to recent L KATHERINE surgery. Pt rating pain on numerical pain scale 4/10. Pt required CGA for supine<>sit and CGA for sit<>stand. Pt required vc for safe techniques. Pt able to participate in gt training w/ RW, WBAT, GB and CGA in hallway w/ antalgic pattern. Pt able to void in bathroom and perform own hygiene. Pt returned to supine in bed w/ all needs within reach, SCDs applied and ice pack to L hip. Nurse Mary Plunkett aware and wife present. [facility]c. Patient will benefit from skilled intervention to address the above impairments.   Patients rehabilitation potential is considered to be Good  Factors which may influence rehabilitation potential include:   []         None noted  []         Mental ability/status  []         Medical condition  []         Home/family situation and support systems  []         Safety awareness  [x]         Pain tolerance/management  []         Other:      PLAN :  Recommendations and Planned Interventions:  [x]           Bed Mobility Training             []    Neuromuscular Re-Education  [x]           Transfer Training                   []    Orthotic/Prosthetic Training  [x]           Gait Training                          []    Modalities  [x]           Therapeutic Exercises          []    Edema Management/Control  [x]           Therapeutic Activities            [x]    Patient and Family Training/Education  []           Other (comment):    Frequency/Duration: Patient will be followed by physical therapy twice daily to address goals. Discharge Recommendations: Home Health  Further Equipment Recommendations for Discharge: N/A     SUBJECTIVE:   Patient stated I am ready.     OBJECTIVE DATA SUMMARY:     Past Medical History:   Diagnosis Date    Bursitis of right shoulder     Chronic pain     right shoulder and lower back.     Diabetes ([address] 2015    GERD (gastroesophageal reflux disease)     Hypercholesterolemia     Hypertension 2015    Morbid obesity (HCC)     1- BMI 46.1    OA (osteoarthritis)      Past Surgical History:   Procedure Laterality Date    HX GI      EGD, Colonoscopy    HX HEENT Bilateral     lasik eye surgery    HX KNEE ARTHROSCOPY Left 12/2017    HX ORTHOPAEDIC Left     CTR    HX OTHER SURGICAL      wisdom teeth extraction ,bone graft    HX OTHER SURGICAL       Barriers to Learning/Limitations: None  Compensate with: visual, verbal, tactile, kinesthetic cues/model  Prior Level of Function/Home Situation:   Home Situation  Home Environment: Private residence  # Steps to Enter: 5  Rails to Enter: Yes  Hand Rails : Bilateral  One/Two Story Residence: Two story  # of Interior Steps: 14  Living Alone: No  Support Systems: Spouse/Significant Other/Partner  Patient Expects to be Discharged to[de-identified] Private residence  Current DME Used/Available at Home: [place] Behavior:  Neurologic State: Alert; Appropriate for age  Orientation Level: Oriented X4  Cognition: Appropriate decision making; Appropriate for age attention/concentration; Appropriate safety awareness; Follows commands  Safety/Judgement: Awareness of environment  Psychosocial  Patient Behaviors: Calm; Cooperative  Family  Behaviors: Supportive;Calm  Purposeful Interaction: Yes  Pt Identified Daily Priority: Clinical issues (comment)  Caritas Process: Establish trust;Teaching/learning; Attend basic human needs  Caring Interventions: Reassure  Reassure: Therapeutic listening; Informing; Acceptance  Skin Condition/Temp: Dry;Warm  Family  Behaviors: Supportive;Calm  Skin Integrity: Incision (comment)(L hip)  Skin Integumentary  Skin Color: Appropriate for ethnicity  Skin Condition/Temp: Dry;Warm  Skin Integrity: Incision (comment)(L hip)  Turgor: Non-tenting  Hair Growth: Present  Varicosities: Absent  Strength:    Strength: Generally decreased, functional  Tone & Sensation:   Tone: Normal  Sensation: Intact  Range Of Motion:  AROM: Generally decreased, functional  Functional Mobility:  Bed Mobility:  Supine to Sit: Contact guard assistance(vc)  Sit to Supine: Contact guard assistance(vc)  Scooting: Contact guard assistance(vc)  Transfers:  Sit to Stand: Contact guard assistance(vc)  Stand to Sit: Contact guard assistance(vc)  Balance:   Sitting: Intact  Standing: Intact; With support  Ambulation/Gait Training:  Distance (ft): 140 Feet (ft)  Assistive Device: Walker, rolling;Gait belt  Ambulation - Level of Assistance: Contact guard assistance(vc)  Gait Abnormalities: Antalgic;Decreased step clearance; Step to gait  Left Side Weight Bearing: As tolerated  Base of Support: Shift to right  Stance: Left decreased  Speed/Estela: Slow  Step Length: Left shortened;Right shortened  Swing Pattern: Right asymmetrical;Left asymmetrical  Interventions: Safety awareness training; Tactile cues; Verbal cues; Visual/Demos  Therapeutic Exercises:   HEP written copy issued to pt per MD protocol. Pain:  Pain Scale 1: Numeric (0 - 10)  Pain Intensity 1: 4  Pain Location 1: Hip  Pain Orientation 1: Left  Pain Description 1: Aching  Pain Intervention(s) 1: Medication (see MAR)  Activity Tolerance:   Fair +  Please refer to the flowsheet for vital signs taken during this treatment. After treatment:   []         Patient left in no apparent distress sitting up in chair  [x]         Patient left in no apparent distress in bed  [x]         Call bell left within reach  [x]         Nursing notified  [x]         Wife present  []         Bed alarm activated    COMMUNICATION/EDUCATION:   [x]         Fall prevention education was provided and the patient/caregiver indicated understanding. [x]         Patient/family have participated as able in goal setting and plan of care. [x]         Patient/family agree to work toward stated goals and plan of care. []         Patient understands intent and goals of therapy, but is neutral about his/her participation. []         Patient is unable to participate in goal setting and plan of care.     Thank you for this referral.  Chapo Mistry, PT   Time Calculation: 24 mins      Eval Complexity: History: HIGH Complexity :3+ comorbidities / personal factors will impact the outcome/ POC Exam:MEDIUM Complexity : 3 Standardized tests and measures addressing body structure, function, activity limitation and / or participation in recreation  Presentation: LOW Complexity : Stable, uncomplicated  Clinical Decision Making:Low Complexity amb >30' Overall Complexity:LOW

## 2019-02-13 NOTE — PERIOP NOTES
TRANSFER - OUT REPORT:    Verbal report given to Tommie Villasenor RN (name) on Gilma Ceja  being transferred to [facility] (unit) for routine post - op       Report consisted of patients Situation, Background, Assessment and   Recommendations(SBAR). Information from the following report(s) SBAR, Kardex, OR Summary, Procedure Summary, Intake/Output, MAR, Recent Results and Cardiac Rhythm NSR was reviewed with the receiving nurse. Lines:   Peripheral IV 02/13/19 Left Hand (Active)   Site Assessment Clean, dry, & intact 2/13/2019 10:39 AM   Phlebitis Assessment 0 2/13/2019 10:39 AM   Infiltration Assessment 0 2/13/2019 10:39 AM   Dressing Status Clean, dry, & intact 2/13/2019 10:39 AM   Dressing Type Transparent 2/13/2019 10:39 AM   Hub Color/Line Status Infusing 2/13/2019 10:39 AM        Opportunity for questions and clarification was provided.       Patient transported with:   O2 @ 2 liters      Intake/Output Summary (Last 24 hours) at 2/13/2019 1121  Last data filed at 2/13/2019 1121  Gross per 24 hour   Intake 3500 ml   Output 150 ml   Net 3350 ml

## 2019-02-13 NOTE — OP NOTES
LEFT COMPUTER NAVIGATED DIRECT ANTERIOR HIP REPLACEMENT    Patient: Janet Bryant MRN: [id number]  CSN: [id number]   YOB: 1960  Age: 64 y.o. Sex: male      Date of Surgery:2/13/2019    PREOPERATIVE DIAGNOSES:LEFT HIP OSTEOARTHRITIS      POSTOPERATIVE DIAGNOSES:LEFT HIP OSTEOARTHRITIS    PROCEDURE: Left press fit computer navigated direct anterior total hip arthroplasty using the [employer]. IMPLANTS:   Implant Name Type Inv. Item Serial No.  Lot No. LRB No. Used Action   LINER ACET PINN NEUT [id number] -- ALTRX - [id number]  LINER ACET PINN NEUT [id number] -- ALTRX  [facility] [employer] [id number] Left 1 Implanted   CUP ACET SECTOR GRIPTION 52MM -- TI - [id number]  CUP ACET SECTOR GRIPTION 52MM -- TI  [facility] [employer] [id number] Left 1 Implanted   PLUG ACET APCL H ELIM POS STP --  - [id number]  PLUG ACET APCL H ELIM POS STP --   [facility] [employer] [id number] Left 1 Implanted   STEM FEM TAPR SZ7 STD OFFSET -- ACTIS - [id number]  STEM FEM TAPR SZ7 STD OFFSET -- ACTIS  [facility] [employer] [id number] Left 1 Implanted   HEAD FEM 36MM +1.5MM NK -- BIOLOX DELTA - [id number]  HEAD FEM 36MM +1.5MM NK -- BIOLOX DELTA  [facility] [employer] [id number] Left 1 Implanted       SURGEON: Jonathan Degroot MD    FIRST ASSISTANT: Artur Manuel PA-C    SECOND ASSISTANT: Physician Assistant: Mark Mckeon PA-C    ANESTHESIA: General    SPECIMENS TO PATHOLOGY: * No specimens in log *    ESTIMATED BLOOD LOSS: 75cc    FINDINGS: Same    COMPLICATIONS: None    INDICATIONS:  A 64 y.o.  male with left severe coxarthrosis documented by clinical exam and x-ray. The patient has bone-on-bone arthritis by x-rays and has failed all conservative interventions including medications, weight loss, physical therapy, relative rest, ambulatory aids and injections.   The patient now presents for a left total hip arthroplasty due to constant pain with activities of daily living and diminished safety due to patients pain. The patients operative leg has a 0 mm leg length discrepency clinically. The patient does not feel that the operative leg is [place] than the nonoperative leg. OPERATION:  The patient was brought into the operating theater, and after adequate appropriate anesthesia the patient was placed on the [place] table. The 1.95gm of oral tranexamic acid was already administered 2 hours ahead of surgery. The left hip was prepped and draped for typical computer navigated anterior hip surgery. The opposite iliac crest had 2 small incisions made for the two 4.0mm Shantz half pins that were placed in the iliac crest using the [employer] Navigation guide. The pelvis tracker was then mounted to the guide. The pelvis was registered as well as the left and the right ASIS. The analgesic cocktail used for the case was 50cc of .25% Marcaine with epinephrine mixed with 30 mg( 1cc ) of Toradol and 30cc of NS ( total of 82 cc). This was injected in the skin as well as the various muscle muscle groups encountered during the procedure using all 81cc's. A 9 cm incision was then made, 3 cm lateral to the anterior superior iliac spine, and 1 cm distal. The incision was deepened down to the fascia of the tensor fascia jose muscle, where the fascia was incised the length of the wound. A Cobra was placed just lateral to the anterior inferior iliac spine and just lateral to the capsule of the hip. The tensor fascia muscle was then retracted with the Sherie, and the rectus femoris was retracted medially with another Sherie. The ascending branches of the lateral femoral circumflex vessels were then clamped and electrocauterized. Using a Craft elevator, the soft tissue was elevated off the anterior part of the femoral capsule, and a Cobra retractor was placed medially. An anterolateral capsulotomy was then performed, from the acetabulum to the intertrochanteric line.  The lateral capsule was excised, and the anterior capsule was elevated off the medial neck. The  tubercle at the upper end of the intertrochanteric line was identified and a 2mm drill was performed lust lateral to this. The measuring tool on the Navigation system was used to measure from the tracker to the drill hole to determine offset and leg lengths and recorded. These were saved for future reference at the end of the case to determine leg length and offset as appropriate. The leg was then slightly externally rotated with traction and cut 1 fingerbreadth above the lesser trochanter. The corkscrew was inserted into the femoral head and it was removed easily rotating the head 180 degrees. A Cobra retractor was placed behind the acetabulum. A skinny Hohmann retractor was placed on the anterior part of the acetabulum rim, and then the labrum and any osteophytes around the acetabulum were resected. The acetabulum was registered using the pointer tracker. The pulvinar in the fovea was resected, and then the acetabulum was reamed in 45 degrees of abduction and the patient's natural anteversion. The reamer was medialized to the base of the fovea and then widened to 1mm less than the actual cup to be implanted. There was good peripheral fit with good bleeding bone. An appropriately sized acetabular cup was selected to be implanted. The acetabulum was Simpulse lavage irrigated and then dried with a sponge stick. The cup was then seated fully with excellent purchase and good stability. The [facility] Navigation system helped select the appropriate abduction and abduction as well as using the patients anatomic landmarks. The final abduction was 41 degrees and the anteversion was 17 degrees. The anterior lip of the cup was just inside the natural acetabulum. The hole eliminator was then placed, and the appropriately sized acetabular liner was then Vergara-tapered into position. No screws were used in the acetabular cup(6.5 cancellous screws).     Attention was now turned to the femur. The femoral hook was placed behind the femur. Traction was taken off the leg, and the hip was maximally externally rotated, adducted and extended. The hip was then brought up into the wound as maximally as possible. A López retractor was placed on the medial neck, and a large Hohmann was placed around the greater trochanter. The capsule, the obturator internus and the piriformis were then released from the inside of the greater trochanter, from anterior to posterior. The patient had excellent mobility of hthe femur. The bone closest to the greater trochanter, at the femoral neck, was then removed with a rongeur. A box osteotome was then used to open the canal, then the lateralizer, the starter broach and finally by the zero broach. This was then broached up to the appropriate size progressively, following the anteversion of the posterior neck of the femur. Once the broach was seated completely the calcar was planed to make the femoral neck cut smooth and even. There was excellent stability on torquing the femoral broach in the femoral canal. The patient was trialed with a appropriately sized femoral head with different neck lengths. The femur was reduced in the acetabulum and the hip was checked for stability clinically and the [employer] Navigation system was used for leg lengths. The appropriately sized femoral head and appropriate neck offset gave excellent anterior stability. This was determined with the [place] navigation system. The hip could be rotated externally 90 degrees at the knee and placing a bone hook behind the femoral neck it could not be dislocated anteriorly. The leg length was +3 mm compared to pre-incision measurement and equivalent offset. These components were selected for implantation. All trial components were removed. The femur was Simpulse lavaged, and the appropriately sized femoral stem was impacted down the femur, with excellent purchase and rotational stability. The appropriately sized femoral head was Vergara tapered on top of the femoral component, reduced into the socket, and the patient had the exact same stability characteristics and leg lengths as noted previously. The wound was irrigated out. The fascia of the tensor muscle was closed with a running locking #1 Vicryl. The skin was closed with inverted 2-0 Vicryls and then dermabonded ( Dermabond Prineo ) in 2 layers. The wound was dressed with a Mepilex dressing. The 2 small stab incisions used for the [employer] system were Dermabonded closed. The patient returned to the recovery room awake and in stable condition. All instrument, sponge, and needle counts were correct. During multiple steps in the procedure the simpulse lavage was used with a 500cc bag of normal saline with 30cc of 5% sterile opthalmologic povidone solution ( .30% ). Before component implantation the bone was irrigated  with normal saline on a bulb syringe. At the end of the case another 500cc normal saline bag (with 50,000 units of bacitracin and 500,000 units of polymixin )was attached to the simpulse lavage and the entire wound reirrgated before closure.       Miguelina Guerrero MD  2/13/2019

## 2019-02-13 NOTE — PROGRESS NOTES
conducted an initial consultation and Spiritual Assessment for [employer], who is a 64 y. o.,male. According to the patients chart Confucianist Affiliation is: Scientologist. The reason the Patient came to the hospital is:   Patient Active Problem List    Diagnosis Date Noted    Osteoarthritis of left hip 02/08/2019    Acute medial meniscus tear of left knee 12/21/2017    High cholesterol 12/18/2017    HTN (hypertension) 12/18/2017    Tear of medial meniscus of left knee, current 12/18/2017        Initiated a relationship of care and support. Provided information about Spiritual Care Services. Offered prayer and assurance of continued prayers on patients behalf. Offered Sacrament of the [address] Plan:  Chaplains will continue to follow and will provide pastoral care as needed or requested.  recommends bedside caregivers page  on duty if patient shows signs of acute spiritual or emotional distress. Father MAC Brand. [facility] - Office

## 2019-02-13 NOTE — PROGRESS NOTES
1140 Received client from PACU in satisfactory condition. Client is A/O X 4. Client is calm and cooperative. No numbness or tingling to the extremities. Skin is warm , dry and skin color is appropriate to race. Bibasilar breath sounds clear. Bowel sounds active . Abdomen is soft and non-tender. Client has not voided post-operatively. No bladder distention evident. No complaints of bladder discomfort. Client has a Mepilex dressing to the Lt hip. Dressing is CDI. SCDS applied bilaterally. Client has 18 gauge IV present in lt hand and running LR. Clients pain is 3/10 on 0-10 scale. Client oriented to call bell use as well as bed use. Client oriented to phone and how to order meals. Call bell within reach. Bed in low position. Three side rails up. 1324 Pt state pain as 4/10. Pt received medication as per MAR. Potential medication side effects explained to patient, patient verbalizes understanding, opportunities for questions provided. Patient stable, no apparent distress at this time, bed in locked position, call bell within reach. 1832 Pt state pain as 6/10. Pt received medication as per MAR. Potential medication side effects explained to patient, patient verbalizes understanding, opportunities for questions provided. Patient stable, no apparent distress at this time, bed in locked position, call bell within reach.

## 2019-02-13 NOTE — ANESTHESIA PREPROCEDURE EVALUATION
Anesthetic History   No history of anesthetic complications            Review of Systems / Medical History  Patient summary reviewed, nursing notes reviewed and pertinent labs reviewed    Pulmonary          Undiagnosed apnea         Neuro/Psych   Within defined limits           Cardiovascular    Hypertension          Hyperlipidemia    Exercise tolerance: >4 METS     GI/Hepatic/Renal     GERD: well controlled           Endo/Other    Diabetes: well controlled, type 2    Morbid obesity and arthritis     Other Findings              Physical Exam    Airway  Mallampati: III  TM Distance: 4 - 6 cm  Neck ROM: normal range of motion   Mouth opening: Normal     Cardiovascular               Dental    Dentition: Caps/crowns     Pulmonary                 Abdominal         Other Findings            Anesthetic Plan    ASA: 3  Anesthesia type: general          Induction: Intravenous  Anesthetic plan and risks discussed with: Patient and Spouse

## 2019-02-13 NOTE — ROUTINE PROCESS
TRANSFER - IN REPORT:    Verbal report received from [address] [place] [address] Street, Rn(name) on [employer]  being received from PACU(unit) for routine post - op      Report consisted of patients Situation, Background, Assessment and   Recommendations(SBAR). Information from the following report(s) SBAR, Kardex, Intake/Output, MAR and Accordion was reviewed with the receiving nurse. Opportunity for questions and clarification was provided.

## 2019-02-14 VITALS
WEIGHT: 315 LBS | RESPIRATION RATE: 16 BRPM | DIASTOLIC BLOOD PRESSURE: 71 MMHG | HEIGHT: 70 IN | HEART RATE: 76 BPM | BODY MASS INDEX: 45.1 KG/M2 | OXYGEN SATURATION: 99 % | TEMPERATURE: 98.2 F | SYSTOLIC BLOOD PRESSURE: 138 MMHG

## 2019-02-14 LAB
ANION GAP SERPL CALC-SCNC: 9 MMOL/L (ref 3–18)
BUN SERPL-MCNC: 15 MG/DL (ref 7–18)
BUN/CREAT SERPL: 14 (ref 12–20)
CALCIUM SERPL-MCNC: 8.5 MG/DL (ref 8.5–10.1)
CHLORIDE SERPL-SCNC: 103 MMOL/L (ref 100–108)
CO2 SERPL-SCNC: 26 MMOL/L (ref 21–32)
CREAT SERPL-MCNC: 1.05 MG/DL (ref 0.6–1.3)
GLUCOSE BLD STRIP.AUTO-MCNC: 154 MG/DL (ref 70–110)
GLUCOSE SERPL-MCNC: 189 MG/DL (ref 74–99)
HCT VFR BLD AUTO: 35.6 % (ref 36–48)
HGB BLD-MCNC: 11.9 G/DL (ref 13–16)
POTASSIUM SERPL-SCNC: 4.1 MMOL/L (ref 3.5–5.5)
SODIUM SERPL-SCNC: 138 MMOL/L (ref 136–145)

## 2019-02-14 PROCEDURE — 80048 BASIC METABOLIC PNL TOTAL CA: CPT

## 2019-02-14 PROCEDURE — 97116 GAIT TRAINING THERAPY: CPT

## 2019-02-14 PROCEDURE — 97165 OT EVAL LOW COMPLEX 30 MIN: CPT

## 2019-02-14 PROCEDURE — 74011250636 HC RX REV CODE- 250/636: Performed by: PHYSICIAN ASSISTANT

## 2019-02-14 PROCEDURE — 82962 GLUCOSE BLOOD TEST: CPT

## 2019-02-14 PROCEDURE — 74011636637 HC RX REV CODE- 636/637: Performed by: PHYSICIAN ASSISTANT

## 2019-02-14 PROCEDURE — 74011250637 HC RX REV CODE- 250/637: Performed by: PHYSICIAN ASSISTANT

## 2019-02-14 PROCEDURE — 85018 HEMOGLOBIN: CPT

## 2019-02-14 PROCEDURE — 36415 COLL VENOUS BLD VENIPUNCTURE: CPT

## 2019-02-14 RX ORDER — OXYCODONE HYDROCHLORIDE 5 MG/1
5-10 TABLET ORAL
Status: DISCONTINUED | OUTPATIENT
Start: 2019-02-14 | End: 2019-02-14 | Stop reason: HOSPADM

## 2019-02-14 RX ORDER — OXYCODONE HYDROCHLORIDE 5 MG/1
5-10 TABLET ORAL
Qty: 60 TAB | Refills: 0 | Status: ON HOLD | OUTPATIENT
Start: 2019-02-14 | End: 2019-03-13 | Stop reason: SDUPTHER

## 2019-02-14 RX ORDER — GUAIFENESIN 100 MG/5ML
81 LIQUID (ML) ORAL 2 TIMES DAILY
Qty: 42 TAB | Refills: 0 | Status: SHIPPED | OUTPATIENT
Start: 2019-02-14 | End: 2019-05-21

## 2019-02-14 RX ADMIN — LORATADINE 10 MG: 10 TABLET ORAL at 08:29

## 2019-02-14 RX ADMIN — OXYCODONE HYDROCHLORIDE 5 MG: 5 TABLET ORAL at 02:57

## 2019-02-14 RX ADMIN — OXYCODONE HYDROCHLORIDE 10 MG: 5 TABLET ORAL at 11:04

## 2019-02-14 RX ADMIN — CEFAZOLIN SODIUM 3 G: 10 INJECTION, POWDER, FOR SOLUTION INTRAVENOUS at 00:10

## 2019-02-14 RX ADMIN — INSULIN LISPRO 2 UNITS: 100 INJECTION, SOLUTION INTRAVENOUS; SUBCUTANEOUS at 08:30

## 2019-02-14 RX ADMIN — KETOROLAC TROMETHAMINE 15 MG: 15 INJECTION, SOLUTION INTRAMUSCULAR; INTRAVENOUS at 06:05

## 2019-02-14 RX ADMIN — ACETAMINOPHEN 650 MG: 325 TABLET, FILM COATED ORAL at 06:05

## 2019-02-14 RX ADMIN — IRBESARTAN 150 MG: 150 TABLET ORAL at 08:29

## 2019-02-14 RX ADMIN — FERROUS SULFATE TAB 325 MG (65 MG ELEMENTAL FE) 325 MG: 325 (65 FE) TAB at 08:30

## 2019-02-14 RX ADMIN — Medication 10 ML: at 00:12

## 2019-02-14 RX ADMIN — Medication 10 ML: at 00:11

## 2019-02-14 RX ADMIN — KETOROLAC TROMETHAMINE 15 MG: 15 INJECTION, SOLUTION INTRAMUSCULAR; INTRAVENOUS at 00:05

## 2019-02-14 RX ADMIN — AMLODIPINE BESYLATE 5 MG: 5 TABLET ORAL at 08:30

## 2019-02-14 RX ADMIN — PANTOPRAZOLE SODIUM 40 MG: 40 TABLET, DELAYED RELEASE ORAL at 08:30

## 2019-02-14 RX ADMIN — ASPIRIN 81 MG: 81 TABLET ORAL at 08:29

## 2019-02-14 RX ADMIN — CARVEDILOL 12.5 MG: 12.5 TABLET, FILM COATED ORAL at 08:29

## 2019-02-14 RX ADMIN — OXYCODONE HYDROCHLORIDE 10 MG: 5 TABLET ORAL at 06:52

## 2019-02-14 NOTE — PROGRESS NOTES
Problem: Mobility Impaired (Adult and Pediatric)  Goal: *Acute Goals and Plan of Care (Insert Text)  In 1-7 days pt will be able to perform:  ST.  Bed mobility:  Rolling L to R to L modified independent for positioning. 2.  Supine to sit to supine S with HR for meals. 3.  Sit to stand to sit S with RW in prep for ambulation. LT.  Gait:  Ambulate >150ft S with RW, WBAT, for home/community mobility. 2.  Stair Negotiation:  Ascend/descend >4 steps CGA with HR for home entry. 3.  Activity tolerance: Tolerate up in chair 1-2 hours for ADLs. 4.  Patient/Family Education:  Patient/family to be independent with HEP for follow-up care and safe discharge. Outcome: Resolved/Met Date Met: 19  physical Therapy TREATMENT/DISCHARGE    Patient: Arelis Salter (78 y.o. male)  Date: 2019  Diagnosis: Osteoarthritis of left hip [M16.12]  Osteoarthritis of left hip [M16.12]  Osteoarthritis of left hip [M16.12] Osteoarthritis of left hip  Procedure(s) (LRB):  LEFT TOTAL HIP ARTHROPLASTY ANTERIOR APPROACH WITH NAVIGATION (Left) 1 Day Post-Op  Precautions: Fall, WBAT  Chart, physical therapy assessment, plan of care and goals were reviewed. PLOF: ambulatory without AD, has a RW  ASSESSMENT:  Pt sitting in chair upon entering room. No difficulty with sit to stand. Ambulated with RW, reciprocal gt pattern, steady pace no LOB or path deviations. Negotiated 5 steps holding (B) hand rails then 5 steps holding L hand rail with 1 hand. Returned to sitting in chair, reviewed HEP hand out. EDUCATION stair nego tech, ambulate hourly, HEP hourly per Dr protocol  Progression toward goals:  [x]      Goals met  []      Improving appropriately and progressing toward goals  []      Improving slowly and progressing toward goals  []      Not making progress toward goals and plan of care will be adjusted     PLAN:  Patient will be discharged from physical therapy at this time.   Rationale for discharge:  [x] Goals Achieved  [] Plateau Reached  [] Patient not participating in therapy  [] Other:  Discharge Recommendations:  Home Health  Further Equipment Recommendations for Discharge:  Has a RW     SUBJECTIVE:   Patient stated Ok great, I have been waiting for you.     OBJECTIVE DATA SUMMARY:     Critical Behavior:  Neurologic State: Alert, Appropriate for age  Orientation Level: Oriented X4  Cognition: Appropriate decision making, Appropriate for age attention/concentration, Appropriate safety awareness, Follows commands  Safety/Judgement: Awareness of environment  Functional Mobility Training:  Bed Mobility:  Supine to Sit: Independent  Sit to Supine: Independent  Transfers:  Sit to Stand: Independent  Stand to Sit: Independent  Balance:  Sitting: Intact  Standing: Intact; With support  Ambulation/Gait Training:  Distance (ft): 200 Feet (ft)  Assistive Device: Gait belt;Walker, rolling  Ambulation - Level of Assistance: Modified independent  Gait Abnormalities: Antalgic  Left Side Weight Bearing: As tolerated  Stairs:  Number of Stairs Trained: 5(x2)  Stairs - Level of Assistance: Modified independent   Rail Use: Both  Pain:  Pre treatment pain:  2  Post treatment pain:  2  Pain Scale 1: Numeric (0 - 10)  Pain Intensity 1: 2  Pain Location 1: Leg  Pain Orientation 1: Anterior  Pain Description 1: Aching  Pain Intervention(s) 1: Cold pack  Activity Tolerance:   Good  Please refer to the flowsheet for vital signs taken during this treatment.   After treatment:   [x] Patient left in no apparent distress sitting up in chair  [] Patient left in no apparent distress in bed  [x] Call bell left within reach  [x] Nursing notified  [] Caregiver present  [] Bed alarm activated  Thea Mcdaniel PTA   Time Calculation: 18 mins

## 2019-02-14 NOTE — PROGRESS NOTES
Problem: Self Care Deficits Care Plan (Adult)  Goal: *Acute Goals and Plan of Care (Insert Text)  Outcome: Resolved/Met Date Met: 02/14/19  Occupational Therapy EVALUATION/discharge    Patient: Julia Lovell [facility] [place]64 y.o. male)  Date: 2/14/2019  Primary Diagnosis: Osteoarthritis of left hip [M16.12]  Osteoarthritis of left hip [M16.12]  Osteoarthritis of left hip [M16.12]  Procedure(s) (LRB):  LEFT TOTAL HIP ARTHROPLASTY ANTERIOR APPROACH WITH NAVIGATION (Left) 1 Day Post-Op   Precautions:   Fall, WBAT    ASSESSMENT AND RECOMMENDATIONS:  Based on the objective data described below, the patient presents with ability to perform ADL tasks at baseline level. Pt was supine fully dressed with HOB elevated upon arrival; pleasant and cooperative. Pt performed bed mobility with S. Pt was educated of using adaptive strategy with LB dressing. Pt required min A to thoroughly don L shoe. Pt performed functional transfers and mobility within room/bathroom with RW and S. Pt was able to stand at sink with RW and S in prep to complete grooming tasks. Pt was educated and verbalized understanding of home safety. Pt's spouse will be available to assist as needed upon discharge to home. Pt was left seated in chair with ICE on L hip, call bell and table tray within reach, and all needs met. Pt with no further OT/ADL concerns at this time. Skilled occupational therapy is not indicated at this time. Education: Reviewed home safety, body mechanics, importance of moving every hour to prevent joint stiffness, role of ice for edema/pain control, Rolling Walker management/safety, and adaptive dressing techniques with patient verbalizing  understanding at this time      Discharge Recommendations: none  Further Equipment Recommendations for Discharge: none      SUBJECTIVE:   Patient stated I am ready to go home. It's been hot in here.     OBJECTIVE DATA SUMMARY:     Past Medical History:   Diagnosis Date    Bursitis of right shoulder     Chronic pain     right shoulder and lower back.  Diabetes ([facility] [address] 2015    GERD (gastroesophageal reflux disease)     Hypercholesterolemia     Hypertension 2015    Morbid obesity (HCC)     1- BMI 46.1    OA (osteoarthritis)      Past Surgical History:   Procedure Laterality Date    HX GI      EGD, Colonoscopy    HX HEENT Bilateral     lasik eye surgery    HX KNEE ARTHROSCOPY Left 12/2017    HX ORTHOPAEDIC Left     CTR    HX OTHER SURGICAL      wisdom teeth extraction ,bone graft    HX OTHER SURGICAL       Barriers to Learning/Limitations: None  Compensate with: visual, verbal, tactile, kinesthetic cues/model    Prior Level of Function/Home Situation: Pt was Ind with ADLs/IADLs prior. Home Situation  Home Environment: Private residence  # Steps to Enter: 5  Rails to Enter: Yes  Hand Rails : Bilateral  One/Two Story Residence: Two story  # of Interior Steps: 14  Living Alone: No  Support Systems: Spouse/Significant Other/Partner  Patient Expects to be Discharged to[de-identified] Private residence  Current DME Used/Available at Home: Walker, rolling  Tub or Shower Type: Tub/Shower combination    Cognitive/Behavioral Status:  Neurologic State: Alert; Appropriate for age  Orientation Level: Oriented X4  Cognition: Appropriate decision making; Appropriate for age attention/concentration; Appropriate safety awareness; Follows commands  Safety/Judgement: Awareness of environment; Fall prevention;Good awareness of safety precautions; Home safety    Skin: L hip incision w/ Mepilex    Edema: compression hose in place & applied ice    Coordination:  Coordination: Within functional limits  Fine Motor Skills-Upper: Left Intact; Right Intact    Gross Motor Skills-Upper: Left Intact; Right Intact    Balance:  Sitting: Intact  Standing: Intact; With support    Strength:  BUE  Strength:  Within functional limits      Tone & Sensation:  BUE  Tone: Normal  Sensation: Intact      Range of Motion:  BUE  AROM: Within functional limits Functional Mobility and Transfers for ADLs:  Bed Mobility:   Supine to Sit: Independent  Sit to Supine: Independent  Scooting: Supervision  Transfers:  Sit to Stand: Independent        Bathroom Mobility: Supervision/set up    ADL Assessment:  Feeding: Independent    Oral Facial Hygiene/Grooming: Supervision    Upper Body Dressing: Setup;Supervision    Lower Body Dressing: Minimum assistance    ADL Intervention:  Feeding  Feeding Assistance: Independent  Container Management: Independent  Cutting Food: Independent  Utensil Management: Independent  Food to Mouth: Independent  Drink to Mouth: Independent    Grooming  Grooming Assistance: Supervision/set up(standing at sink with RW)    Upper Body Dressing Assistance  Dressing Assistance: Supervision/set-up    Lower Body Dressing Assistance  Slip on Shoes with Back: Minimum assistance(heel of L shoe)  Position Performed: Bending forward method;Seated edge of bed  Adaptive Equipment Used: Walker    Cognitive Retraining  Safety/Judgement: Awareness of environment; Fall prevention;Good awareness of safety precautions; Home safety    Pain:  Pre-treatment: 1/10  Post-treatment: 1/10  Activity Tolerance:   Pt overall tolerated session well with no signs of fatigue or distress. Please refer to the flowsheet for vital signs taken during this treatment. After treatment:   [x]  Patient left in no apparent distress sitting up in chair  []  Patient left in no apparent distress in bed  [x]  Call bell left within reach  []  Nursing notified  []  Caregiver present  []  Bed alarm activated    COMMUNICATION/EDUCATION:   Communication/Collaboration:  [x]      Home safety education was provided and the patient/caregiver indicated understanding. [x]      Patient/family have participated as able and agree with findings and recommendations. []      Patient is unable to participate in plan of care at this time.     Thank you for this referral.  Luis Beavers, OTR/L  Time Calculation: 15 mins      Eval Complexity: History: LOW Complexity : Brief history review ; Examination: LOW Complexity : 1-3 performance deficits relating to physical, cognitive , or psychosocial skils that result in activity limitations and / or participation restrictions ;    Decision Making:LOW Complexity : No comorbidities that affect functional and no verbal or physical assistance needed to complete eval tasks

## 2019-02-14 NOTE — PROGRESS NOTES
2024 - Patient in bed at this time. A/O x 4. IV to left hand  intact and patent. SCDs to bilateral legs. Mepilex dressing to left hip CDI. No numbness/tingling. Pedal pulses palpable. Lungs CTA. Bowel sounds active to all quadrants. Pain 6/10.     2250-Pain rated 8/10, Oxycodone 5 mg given. 2345-Pain decreased to 6/10.  2358-Ambulated to bathroom with walker and one minimal assist. Pain continues at 6/10.    0005-Toradol given for pain rated 6/10. CHG wipes completed. Gown and bed pad changed. 0100-Pain decreased to 3/10.  0258-Pain rated 3/10, Oxycodone 5 mg given. 0652-Pain rated 3/10, Oxycodone 10 mg given in anticipation of PT. Shift summary-Ambulates to bathroom with walker and one assist. Voiding without difficulty. Pain controlled with PRN pain medication.

## 2019-02-14 NOTE — DISCHARGE INSTRUCTIONS
Aby Hairston III, MD Michalene Showman, PA-C    Lower Extremity Surgery  Discharge Instructions      Please take the time to review the following instructions before you leave the hospital and use them as guidelines during your recovery from surgery. If you have any questions you may contact my office at [phone]. Wound Care/Dressing Changes:    [x]   You may change your dressing as needed. Beginning the 2 days after you are discharged from the hospital you can change your dressing daily. A big, bulky dressing isn't necessary as long as there isn't any drainage from the incisions. You will be shown how to change the dressings properly by the home health aids as well. There will be a piece of tape over your incision that resembles gauze. This tape should stay on and you should not attempt to remove it. Once you begin to get this wet in the shower this will begin to fall off on its own, although it will take days. Do not apply antibiotic ointment to your incisions. Showering/Bathing:    [x]   You may shower 2 days after surgery. Your dressing may be removed for showering. You may get your incisions wet in the shower. Don't vigorously scrub the area where your incisions are. Please pat dry the incision. Apply a clean, dry dressing after drying off the area of your incisions. Don't take a tub bath, get in a swimming pool or Jacuzzi until the incisions are completely healed, and you are seen in the office by Dr. Walter De La O. Do not soak your incisions under water. []   Do not get the dressing wet. Once you remove it two days from surgery, you may get the incision wet if there is no drainage. Weight Bearing Status/Braces/Activity:    []   If you have had a total knee replacement you may weight bear as tolerated with the knee immobilizer in place. Use crutches, cane, or walker as needed. You should sleep in your knee immobilizer.   You need to begin working on range of motion early after your surgery. It is very important to work on extension (straighthening) the knee. You will be advanced with walking and range of motion by physical therapy at home. [x]   If you have had a total hip replacement you may weight bear as tolerated. Physical therapy with come by your house to help you perform exercises and work on strength and range of motion. Ice/Elevation:    Continue ice and elevation consistently for 48 hours after surgery. If you have had a total knee replacement when elevating your knee elevate it on about 4 pillows to be sure it is above your heart. After 48 hours, you should ice your knee 3 times per day, for 20 minutes at a time for the next 5 days. After one week from surgery, you may use ice and elevation as needed for pain and swelling. Diet:    You may advance to your regular diet as tolerated. Medication:    1. You will be given a prescription for pain medications when you are discharged from the hospital.  Take the medication as needed according to the directions on the prescription bottle. Possible side effects of the medication include dizziness, headache, nausea, vomiting, constipation and urinary retention. If you experience any of these side effects call the office so that we can assist you in relieving them. Discontinue the use of the pain medication if you develop itching, rash, shortness of breath or difficulties swallowing. If these symptoms become severe or aren't relieved by discontinuing the medication you should seek immediate medical attention. Refills of pain medication are authorized during office hours only (8AM - 5PM Mon thru Fri). 2. If you were prescribed Percocet/oxycodone or Dilaudid/hydromorphone you must have a written prescription. These medications legally cannot be called in to a pharmacy. 3. Do not take Tylenol in addition to your pain medication as most of the pain medication already contains Tylenol.   Exceptions include Dilaudid/hydromorphone, Demerol/meperidine and roxicodone. Do not exceed 3000 mg of Tylenol per day. Ex:  (hydrocodone 5/325mg = 325 mg of Tylenol)  4. You should use Aspirin 81 mg twice daily for 21 days from the date of your surgery. This will help to prevent blood clots from forming in your legs. This needs to be started the day after your surgery and home healthcare will teach you how this is done. If you are taking another medication such as Xarelto as discussed with Dr. Abbie Viera this should also be started the day after the surgery. 5. You may resume the medications you were taking prior to your surgery, unless otherwise specified in your discharge instructions. Pain medication may change the effects of any antidepressant medication. If you have any questions about possible interactions between your regular medications and the pain medication you should consult the physician who prescribes your regular medications. 6. If you had a total joint as an outpatient procedure and did not spend the night in the hospital, Dr. Abbie Viera has written for an oral antibiotic that you should take as instructed. This antibiotic is generally Keflex or Clindamycin. If you spent the night in the hospital the antibiotic was given to you through the IV and there is nothing else to take orally. Follow Up Appointment:    If you are unsure of your follow-up appointment date and time, please call [phone]. Please let our  know you are scheduling a post-op appointment. Most appointments should be between 7-14 days after your surgery. Physical Therapy:    [x]   Physical therapy will be discussed with you at your first follow-up appointment with Dr. Abbie Viera. You don't need to begin physical therapy prior to that visit. You are to participate with [address] [place] [address] as arranged pre-operatively in the convience of your own home.     Important signs and symptoms:    If any of the following signs and symptoms occurs, you should contact Dr. Vi Calle' office. Please be advised if a problem arises which you feel required immediate medical attention or your are unable to contact Dr. Vi Calle' office you should seek immediate medical attention. Signs and symptoms to watch for include:  1. A sudden increase in swelling and/or redness or warmth at the area your surgery was performed which isn't relieved by rest, ice and elevation. 2. Oral temperature greater than 101.5 degrees for 12 hours or more which isn't relieved by an increase in fluid intake and taking two Tylenol every 4-6 hours. Do not exceed 3000 mg of Tylenol per day. 3. Excessive drainage from your incisions or drainage that hasn't stopped by 72 hours. 4. Calf pian, tenderness, redness or swelling which isn't relieved with rest and elevation. 5. Fever, chills, shortness of breath, chest pain, nausea, vomiting or other signs and symptoms which are of concern to you.

## 2019-02-14 NOTE — ANESTHESIA POSTPROCEDURE EVALUATION
Post-Anesthesia Evaluation and Assessment    Cardiovascular Function/Vital Signs  Visit Vitals  /71   Pulse 76   Temp 36.8 °C (98.2 °F)   Resp 16   Ht 5' 10\" (1.778 m)   Wt 142.9 kg (315 lb)   SpO2 99%   BMI 45.20 kg/m²       Patient is status post Procedure(s):  LEFT TOTAL HIP ARTHROPLASTY ANTERIOR APPROACH WITH NAVIGATION. Nausea/Vomiting: Controlled. Postoperative hydration reviewed and adequate. Pain:  Pain Scale 1: Numeric (0 - 10) (02/14/19 1104)  Pain Intensity 1: 6 (02/14/19 1104)   Managed. Neurological Status:   Neuro (WDL): Within Defined Limits (02/13/19 1056)   At baseline. Mental Status and Level of Consciousness: Baseline and appropriate for discharge. Pulmonary Status:   O2 Device: Room air (02/14/19 0800)   Adequate oxygenation and airway patent. Complications related to anesthesia: None    Post-anesthesia assessment completed. No concerns. Patient has met all discharge requirements.  LATE ENTRY    Signed By: Kang Rosas MD    February 14, 2019

## 2019-02-14 NOTE — PROGRESS NOTES
Problem: Falls - Risk of  Goal: *Absence of Falls  Document Joanna Fall Risk and appropriate interventions in the flowsheet.   Outcome: Progressing Towards Goal  Fall Risk Interventions:  Mobility Interventions: Utilize walker, cane, or other assistive device    Mentation Interventions: Adequate sleep, hydration, pain control    Medication Interventions: Assess postural VS orthostatic hypotension    Elimination Interventions: Elevated toilet seat

## 2019-02-14 NOTE — PROGRESS NOTES
0730 Assumed care of pt at this time. Pt in bed with no signs of distress. Pt left with call light within reach and encouraged to call for assistance. 0800 Head to toe assessment completed at this time  Patient is A&OX4. Pt denies N/V chest pain and SOB or distress. Pt is calm and cooperative. Pedal pulses are present. Capillary refill less than 3 seconds. Skin in warm and dry with mepilex dressing on Lt hip and is CDI. Lungs are clear bilaterally. Patient instructed on use and reason for incentive spirometer. Bowel sounds are active. Abdomen is soft and non-tender. SCDS in place bilaterally . Positive dorsalis pedis pulse, sensation, warm. No tingling or numbness to lower extremities. 18 g needle in the Lt hand. Pain scale explained to patient. Reasons for taking PRN meds explained to patient. Patient instructed to call for prn when needed. Pain level is 2. Patient was oriented to call bell and bed function. Will continue to monitor    1104 Dual AVS reviewed with Abdelrahman OLVERARn. All medications reviewed individually with patient. Opportunities for questions and concerns provided. Patient discharged via car . Patient's arm band appropriately discarded.

## 2019-02-14 NOTE — PROGRESS NOTES
Progress Note      Patient: Angle Ulrich               Sex: male          DOA: 2/13/2019     YOB: 1960      Age:  64 y.o.        LOS:  LOS: 1 day     Status Post: Procedure(s):  LEFT TOTAL HIP ARTHROPLASTY ANTERIOR APPROACH WITH NAVIGATION  Surgery Date: 2/13/2019            Subjective:     Angle Ulrich is a 64 y.o. male without c/o nausea. Pain reasonably well controlled with PO medications. Patient denies any complaint of calf pain/ SOB or difficulty breathing. Objective:      Visit Vitals  /65 (BP 1 Location: Right arm, BP Patient Position: At rest)   Pulse 82   Temp 97.9 °F (36.6 °C)   Resp 16   Ht 5' 10\" (1.778 m)   Wt 142.9 kg (315 lb)   SpO2 99%   BMI 45.20 kg/m²       Physical Exam:   Dressing:  clean, dry, intact  Wiggles Toes/Ankle  Foot sensation intact to light touch  No foot edema/ +1 Posterior Tibial Pulse  Leg Lengths appear equal  Calf soft, supple and non tender. Negative Homans sign    Xray - Looks good    Intake and Output:  Current Shift:  02/13 1901 - 02/14 0700  In: -   Out: 1400 [YQWXD:2011]  Last three shifts:  02/12 0701 - 02/13 1900  In: 5218 [P.O.:480; I.V.:4044]  Out: 450 [Urine:300]  Voiding Status:  + void without need for Banerjee catheter    Lab/Data Reviewed:  Recent Labs     02/14/19  0330   HGB 11.9*   HCT 35.6*      K 4.1   BUN 15   CREA 1.05   *         Medications Reviewed    Assessment/Plan     Principal Problem:    Osteoarthritis of left hip (2/8/2019)    Active Problems:    High cholesterol (12/18/2017)      HTN (hypertension) (12/18/2017)        · Change IV to Saline Lock. · Discharge Planning for home. · Begin DVT Prophylaxis - Aspirin 81 mg twice daily   · Continue PT WBAT, ROM as tolerated. Continue exercises hourly using the physical therapy exercises sheet. · Discharge home today with home health. · Stable from ortho perspective for discharge. · Follow up in ten days in the office with Dr. Yovani Dudley.         The patient was seen and examined by Dr. Aby Figueroa today as well and he is in agreement with above.

## 2019-02-14 NOTE — ROUTINE PROCESS
Bedside and Verbal shift change report given to LUDMILA Alejandra RN (oncoming nurse) by Socorro Rosas RN (offgoing nurse). Report included the following information SBAR, Kardex, Intake/Output and MAR.

## 2019-02-14 NOTE — ROUTINE PROCESS
Bedside and Verbal shift change report given to LUDMILA Schuster RN (oncoming nurse) by LUDMILA Ceja RN (offgoing nurse). Report included the following information SBAR, Kardex, Intake/Output and MAR.

## 2019-02-14 NOTE — PROGRESS NOTES
Transition of Care (ANUP) Plan:     Chart reviewed, met with pt and family in room. Pt planning discharge home with family to assist. [facility] offered, pt chose [facility] [phone] for follow up; referral placed with CMS. Pt has RW for home. ANUP Transportation:   How is patient being transported at discharge? Family/Friend      When? Once cleared by Therapy between 12-2pm     Is transport scheduled? N/A      Follow-up appointment and transportation:   PCP/Specialist?  See AVS for Appointment         Who is transporting to the follow-up appointment? Family/Friend      Is transport for follow up appointment scheduled? N/A    Communication plan (with patient/family): Who is being called? Patient or Next of Kin? Responsible party? Patient      What number(s) is to be used? See Facesheet      What service provider is calling for [facility] services? When are they calling? 24-48 hours following discharge    Readmission Risk? (Green/Low; Yellow/Moderate; Red/High):  Green    Care Management Interventions  PCP Verified by CM:  Yes  Transition of Care Consult (CM Consult): [address]: No  Reason Outside [place]: Physician referred to specific agency([facility])  Discharge Durable Medical Equipment: No  Physical Therapy Consult: Yes  Occupational Therapy Consult: Yes  Current Support Network: Lives with Spouse  Confirm Follow Up Transport: Family  Plan discussed with Pt/Family/Caregiver: Yes  Freedom of Choice Offered: Yes  Discharge Location  Discharge Placement: Home with home health

## 2019-02-14 NOTE — ROUTINE PROCESS
Bedside shift change report given to Gian Mckeon Rn (oncoming nurse) by Darlene ROY RN (offgoing nurse). Report included the following information SBAR, Kardex and MAR.

## 2019-03-12 PROBLEM — T81.49XA LEFT HIP POSTOPERATIVE WOUND INFECTION: Status: ACTIVE | Noted: 2019-03-12

## 2019-03-12 NOTE — H&P
History and Physical        Patient: Cathi Grarison               Sex: male          DOA: (Not on file)         YOB: 1960      Age:  64 y.o.        LOS:  LOS: 0 days        HPI:      Jenifer Cross is now four weeks out from his left computer navigated direct anterior KATHERINE with recent superior drainage. Dr. Dorcas Heller just saw him a week ago and he was doing well. He has had discharge and draining that deveoloped about two weeks ago. He had a slight fever the other day. He has been taking Keflex for a week and the discharge has no stopped. He has been changing his dressing multiple times per day. He says his pain, though, is much better now compared to yesterday. He said he did sweat one night. AP pelvis of the hip were obtained and interpreted in the office and show good positioning of the components without evidence of migration or loosening. Past Medical History:   Diagnosis Date    Bursitis of right shoulder     Chronic pain     right shoulder and lower back.  Diabetes ([facility] [address] 2015    GERD (gastroesophageal reflux disease)     Hypercholesterolemia     Hypertension 2015    Morbid obesity ([facility] [address]     1- BMI 46.1    OA (osteoarthritis)        Past Surgical History:   Procedure Laterality Date    HX GI      EGD, Colonoscopy    HX HEENT Bilateral     lasik eye surgery    HX KNEE ARTHROSCOPY Left 12/2017    HX ORTHOPAEDIC Left     CTR    HX OTHER SURGICAL      wisdom teeth extraction ,bone graft    HX OTHER SURGICAL         No family history on file. Social History     Socioeconomic History    Marital status:      Spouse name: Not on file    Number of children: Not on file    Years of education: Not on file    Highest education level: Not on file   Tobacco Use    Smoking status: Current Some Day Smoker    Smokeless tobacco: Never Used    Tobacco comment: one cigar 3 x year   Substance and Sexual Activity    Alcohol use:  Yes     Alcohol/week: 3.6 oz Types: 2 Glasses of wine, 2 Cans of beer, 2 Shots of liquor per week    Drug use: No    Sexual activity: Yes     Partners: Female       Prior to Admission medications    Medication Sig Start Date End Date Taking? Authorizing Provider   oxyCODONE IR (ROXICODONE) 5 mg immediate release tablet Take 1-2 Tabs by mouth every four (4) hours as needed for Pain. Max Daily Amount: 60 mg. 2/14/19   Victorino Alvarado PA-C   aspirin 81 mg chewable tablet Take 1 Tab by mouth two (2) times a day. 2/14/19   Victorino Alvarado PA-C   amLODIPine (NORVASC) 5 mg tablet Take 5 mg by mouth daily. Provider, Historical   dulaglutide (TRULICITY) 1.5 BX/0.8 mL sub-q pen 1.5 mg by SubCUTAneous route every seven (7) days. Provider, Historical   fexofenadine (ALLEGRA) 180 mg tablet Take 180 mg by mouth daily. Provider, Historical   metFORMIN (GLUCOPHAGE) 1,000 mg tablet Take 1,000 mg by mouth two (2) times daily (with meals). Provider, Historical   ergocalciferol (ERGOCALCIFEROL) 50,000 unit capsule Take 50,000 Units by mouth every Monday. Provider, Historical   irbesartan (AVAPRO) 150 mg tablet Take 150 mg by mouth two (2) times a day. Provider, Historical   atorvastatin (LIPITOR) 20 mg tablet Take 20 mg by mouth nightly. Monday, Tuesday, Wednesday, Thursday, Friday     Provider, Historical   zolpidem (AMBIEN) 5 mg tablet Take 5 mg by mouth nightly as needed for Sleep. Provider, Historical   metaxalone (SKELAXIN) 800 mg tablet Take 800 mg by mouth every six (6) hours as needed for Pain. Provider, Historical   amLODIPine (NORVASC) 2.5 mg tablet Take 2.5 mg by mouth every evening. Provider, Historical   carvedilol (COREG) 12.5 mg tablet Take 12.5 mg by mouth two (2) times a day. Provider, Historical   esomeprazole (NEXIUM) 20 mg capsule Take 20 mg by mouth daily.     Provider, Historical       Allergies   Allergen Reactions    Bydureon [Exenatide Microspheres] Other (comments)     Scar tissue under skin       Review of Systems  A comprehensive review of systems was negative except for that written in the History of Present Illness. Physical Exam:      There were no vitals taken for this visit. Physical Exam:  Physical exam shows his left hip incision looks good. The very superior part of the incision has a seroma-like material that is coming out without purulence. He also has increased drainage from the inferior aspect of the wound. This was repacked today. Assessment/Plan     Principal Problem:    Left hip postoperative wound infection (3/12/2019)    Active Problems:    High cholesterol (12/18/2017)      HTN (hypertension) (12/18/2017)      Dr. Merlene Becker scheduled the patient for an I and D. The risks associated with the surgery including DVT, PE, fracture, need for future surgery, infection, need for extended IV abx, amongst others were reviewed and all questions were answered. He is going to be added on for tomorrow. If the infection is in the joint he will need to be admitted and ID consulted with PICC line placement, we would also do a poly exchange. If it is superficial Dr. Merlene Becker anticipates that the patient will be able to receive ABx outpatient, this will be packed open and he will be sent to wound care. He understands this and is willing to proceed. We will hold on IV ABX at the time of surgery.

## 2019-03-13 ENCOUNTER — HOSPITAL ENCOUNTER (OUTPATIENT)
Age: 59
Discharge: HOME | End: 2019-03-13
Attending: ORTHOPAEDIC SURGERY | Admitting: ORTHOPAEDIC SURGERY
Payer: COMMERCIAL

## 2019-03-13 ENCOUNTER — ANESTHESIA EVENT (OUTPATIENT)
Dept: SURGERY | Age: 59
End: 2019-03-13
Payer: COMMERCIAL

## 2019-03-13 ENCOUNTER — ANESTHESIA (OUTPATIENT)
Dept: SURGERY | Age: 59
End: 2019-03-13
Payer: COMMERCIAL

## 2019-03-13 ENCOUNTER — HOSPITAL ENCOUNTER (OUTPATIENT)
Dept: PREADMISSION TESTING | Age: 59
Discharge: HOME | End: 2019-03-13
Attending: ORTHOPAEDIC SURGERY
Payer: COMMERCIAL

## 2019-03-13 VITALS
HEART RATE: 79 BPM | OXYGEN SATURATION: 95 % | DIASTOLIC BLOOD PRESSURE: 78 MMHG | WEIGHT: 304.6 LBS | HEIGHT: 70 IN | RESPIRATION RATE: 16 BRPM | SYSTOLIC BLOOD PRESSURE: 124 MMHG | BODY MASS INDEX: 43.61 KG/M2 | TEMPERATURE: 97.3 F

## 2019-03-13 DIAGNOSIS — M16.12 PRIMARY OSTEOARTHRITIS OF LEFT HIP: Chronic | ICD-10-CM

## 2019-03-13 PROBLEM — T81.49XA POSTOPERATIVE WOUND INFECTION OF LEFT HIP: Status: ACTIVE | Noted: 2019-03-13

## 2019-03-13 LAB
ANION GAP SERPL CALC-SCNC: 7 MMOL/L (ref 3–18)
BUN SERPL-MCNC: 15 MG/DL (ref 7–18)
BUN/CREAT SERPL: 15 (ref 12–20)
CALCIUM SERPL-MCNC: 9.1 MG/DL (ref 8.5–10.1)
CHLORIDE SERPL-SCNC: 107 MMOL/L (ref 100–108)
CO2 SERPL-SCNC: 27 MMOL/L (ref 21–32)
CREAT SERPL-MCNC: 0.98 MG/DL (ref 0.6–1.3)
ERYTHROCYTE [DISTWIDTH] IN BLOOD BY AUTOMATED COUNT: 12.4 % (ref 11.6–14.5)
EST. AVERAGE GLUCOSE BLD GHB EST-MCNC: 148 MG/DL
GLUCOSE BLD STRIP.AUTO-MCNC: 130 MG/DL (ref 70–110)
GLUCOSE BLD STRIP.AUTO-MCNC: 134 MG/DL (ref 70–110)
GLUCOSE SERPL-MCNC: 150 MG/DL (ref 74–99)
HBA1C MFR BLD: 6.8 % (ref 4.2–5.6)
HCT VFR BLD AUTO: 36.8 % (ref 36–48)
HGB BLD-MCNC: 12 G/DL (ref 13–16)
MCH RBC QN AUTO: 30.2 PG (ref 24–34)
MCHC RBC AUTO-ENTMCNC: 32.6 G/DL (ref 31–37)
MCV RBC AUTO: 92.7 FL (ref 74–97)
PLATELET # BLD AUTO: 372 K/UL (ref 135–420)
PMV BLD AUTO: 8.5 FL (ref 9.2–11.8)
POTASSIUM SERPL-SCNC: 4.3 MMOL/L (ref 3.5–5.5)
RBC # BLD AUTO: 3.97 M/UL (ref 4.7–5.5)
SODIUM SERPL-SCNC: 141 MMOL/L (ref 136–145)
WBC # BLD AUTO: 7.5 K/UL (ref 4.6–13.2)

## 2019-03-13 PROCEDURE — 74011250636 HC RX REV CODE- 250/636: Performed by: PHYSICIAN ASSISTANT

## 2019-03-13 PROCEDURE — 76210000006 HC OR PH I REC 0.5 TO 1 HR: Performed by: ORTHOPAEDIC SURGERY

## 2019-03-13 PROCEDURE — 87077 CULTURE AEROBIC IDENTIFY: CPT

## 2019-03-13 PROCEDURE — 87075 CULTR BACTERIA EXCEPT BLOOD: CPT

## 2019-03-13 PROCEDURE — 74011250636 HC RX REV CODE- 250/636

## 2019-03-13 PROCEDURE — 77030031139 HC SUT VCRL2 J&J -A: Performed by: ORTHOPAEDIC SURGERY

## 2019-03-13 PROCEDURE — 82962 GLUCOSE BLOOD TEST: CPT

## 2019-03-13 PROCEDURE — 77030012508 HC MSK AIRWY LMA AMBU -A: Performed by: ANESTHESIOLOGY

## 2019-03-13 PROCEDURE — 85027 COMPLETE CBC AUTOMATED: CPT

## 2019-03-13 PROCEDURE — 80048 BASIC METABOLIC PNL TOTAL CA: CPT

## 2019-03-13 PROCEDURE — 74011250637 HC RX REV CODE- 250/637: Performed by: ANESTHESIOLOGY

## 2019-03-13 PROCEDURE — 77030039267 HC ADH SKN EXOFIN S2SG -B: Performed by: ORTHOPAEDIC SURGERY

## 2019-03-13 PROCEDURE — 77030020782 HC GWN BAIR PAWS FLX 3M -B: Performed by: ORTHOPAEDIC SURGERY

## 2019-03-13 PROCEDURE — 74011000250 HC RX REV CODE- 250

## 2019-03-13 PROCEDURE — 77030038010: Performed by: ORTHOPAEDIC SURGERY

## 2019-03-13 PROCEDURE — 74011250636 HC RX REV CODE- 250/636: Performed by: ORTHOPAEDIC SURGERY

## 2019-03-13 PROCEDURE — 77030032490 HC SLV COMPR SCD KNE COVD -B: Performed by: ORTHOPAEDIC SURGERY

## 2019-03-13 PROCEDURE — 76010000138 HC OR TIME 0.5 TO 1 HR: Performed by: ORTHOPAEDIC SURGERY

## 2019-03-13 PROCEDURE — 87070 CULTURE OTHR SPECIMN AEROBIC: CPT

## 2019-03-13 PROCEDURE — 74011000250 HC RX REV CODE- 250: Performed by: ORTHOPAEDIC SURGERY

## 2019-03-13 PROCEDURE — 77030018836 HC SOL IRR NACL ICUM -A: Performed by: ORTHOPAEDIC SURGERY

## 2019-03-13 PROCEDURE — 77030037875 HC DRSG MEPILEX <16IN BORD MOLN -A: Performed by: ORTHOPAEDIC SURGERY

## 2019-03-13 PROCEDURE — 36415 COLL VENOUS BLD VENIPUNCTURE: CPT

## 2019-03-13 PROCEDURE — 76210000020 HC REC RM PH II FIRST 0.5 HR: Performed by: ORTHOPAEDIC SURGERY

## 2019-03-13 PROCEDURE — 74011250636 HC RX REV CODE- 250/636: Performed by: ANESTHESIOLOGY

## 2019-03-13 PROCEDURE — 76060000032 HC ANESTHESIA 0.5 TO 1 HR: Performed by: ORTHOPAEDIC SURGERY

## 2019-03-13 PROCEDURE — 77030008477 HC STYL SATN SLP COVD -A: Performed by: ANESTHESIOLOGY

## 2019-03-13 PROCEDURE — 77030008683 HC TU ET CUF COVD -A: Performed by: ANESTHESIOLOGY

## 2019-03-13 PROCEDURE — 77030018673: Performed by: ORTHOPAEDIC SURGERY

## 2019-03-13 PROCEDURE — 77030006643: Performed by: ANESTHESIOLOGY

## 2019-03-13 PROCEDURE — 77030034694 HC SCPL CANADY PLSM DISP USMD -E: Performed by: ORTHOPAEDIC SURGERY

## 2019-03-13 PROCEDURE — 77030039266 HC ADH SKN EXOFIN S2SG -A: Performed by: ORTHOPAEDIC SURGERY

## 2019-03-13 PROCEDURE — 83036 HEMOGLOBIN GLYCOSYLATED A1C: CPT

## 2019-03-13 PROCEDURE — 74011000258 HC RX REV CODE- 258: Performed by: ORTHOPAEDIC SURGERY

## 2019-03-13 PROCEDURE — 87186 SC STD MICRODIL/AGAR DIL: CPT

## 2019-03-13 PROCEDURE — 77030013708 HC HNDPC SUC IRR PULS STRY –B: Performed by: ORTHOPAEDIC SURGERY

## 2019-03-13 RX ORDER — HYDROMORPHONE HYDROCHLORIDE 2 MG/ML
0.2 INJECTION, SOLUTION INTRAMUSCULAR; INTRAVENOUS; SUBCUTANEOUS
Status: DISCONTINUED | OUTPATIENT
Start: 2019-03-13 | End: 2019-03-13 | Stop reason: HOSPADM

## 2019-03-13 RX ORDER — ONDANSETRON 2 MG/ML
INJECTION INTRAMUSCULAR; INTRAVENOUS AS NEEDED
Status: DISCONTINUED | OUTPATIENT
Start: 2019-03-13 | End: 2019-03-13 | Stop reason: HOSPADM

## 2019-03-13 RX ORDER — SODIUM CHLORIDE 0.9 % (FLUSH) 0.9 %
5-40 SYRINGE (ML) INJECTION AS NEEDED
Status: DISCONTINUED | OUTPATIENT
Start: 2019-03-13 | End: 2019-03-13 | Stop reason: HOSPADM

## 2019-03-13 RX ORDER — PANTOPRAZOLE SODIUM 40 MG/1
40 TABLET, DELAYED RELEASE ORAL ONCE
Status: DISCONTINUED | OUTPATIENT
Start: 2019-03-13 | End: 2019-03-13 | Stop reason: HOSPADM

## 2019-03-13 RX ORDER — ACETAMINOPHEN 500 MG
1000 TABLET ORAL ONCE
Status: DISCONTINUED | OUTPATIENT
Start: 2019-03-13 | End: 2019-03-13 | Stop reason: SDUPTHER

## 2019-03-13 RX ORDER — SODIUM CHLORIDE 0.9 % (FLUSH) 0.9 %
5-40 SYRINGE (ML) INJECTION EVERY 8 HOURS
Status: DISCONTINUED | OUTPATIENT
Start: 2019-03-13 | End: 2019-03-13 | Stop reason: HOSPADM

## 2019-03-13 RX ORDER — TRANEXAMIC ACID 650 1/1
1950 TABLET ORAL ONCE
Status: DISCONTINUED | OUTPATIENT
Start: 2019-03-13 | End: 2019-03-13 | Stop reason: CLARIF

## 2019-03-13 RX ORDER — LIDOCAINE HYDROCHLORIDE 20 MG/ML
INJECTION, SOLUTION EPIDURAL; INFILTRATION; INTRACAUDAL; PERINEURAL AS NEEDED
Status: DISCONTINUED | OUTPATIENT
Start: 2019-03-13 | End: 2019-03-13 | Stop reason: HOSPADM

## 2019-03-13 RX ORDER — DEXTROSE 50 % IN WATER (D50W) INTRAVENOUS SYRINGE
25-50 AS NEEDED
Status: DISCONTINUED | OUTPATIENT
Start: 2019-03-13 | End: 2019-03-13 | Stop reason: HOSPADM

## 2019-03-13 RX ORDER — KETAMINE HYDROCHLORIDE 10 MG/ML
INJECTION, SOLUTION INTRAMUSCULAR; INTRAVENOUS AS NEEDED
Status: DISCONTINUED | OUTPATIENT
Start: 2019-03-13 | End: 2019-03-13 | Stop reason: HOSPADM

## 2019-03-13 RX ORDER — INSULIN LISPRO 100 [IU]/ML
INJECTION, SOLUTION INTRAVENOUS; SUBCUTANEOUS ONCE
Status: DISCONTINUED | OUTPATIENT
Start: 2019-03-13 | End: 2019-03-13 | Stop reason: HOSPADM

## 2019-03-13 RX ORDER — SODIUM CHLORIDE, SODIUM LACTATE, POTASSIUM CHLORIDE, CALCIUM CHLORIDE 600; 310; 30; 20 MG/100ML; MG/100ML; MG/100ML; MG/100ML
125 INJECTION, SOLUTION INTRAVENOUS CONTINUOUS
Status: DISCONTINUED | OUTPATIENT
Start: 2019-03-13 | End: 2019-03-13 | Stop reason: HOSPADM

## 2019-03-13 RX ORDER — ACETAMINOPHEN 500 MG
1000 TABLET ORAL
Status: COMPLETED | OUTPATIENT
Start: 2019-03-13 | End: 2019-03-13

## 2019-03-13 RX ORDER — DEXAMETHASONE SODIUM PHOSPHATE 4 MG/ML
4 INJECTION, SOLUTION INTRA-ARTICULAR; INTRALESIONAL; INTRAMUSCULAR; INTRAVENOUS; SOFT TISSUE ONCE
Status: COMPLETED | OUTPATIENT
Start: 2019-03-13 | End: 2019-03-13

## 2019-03-13 RX ORDER — ROCURONIUM BROMIDE 10 MG/ML
INJECTION, SOLUTION INTRAVENOUS AS NEEDED
Status: DISCONTINUED | OUTPATIENT
Start: 2019-03-13 | End: 2019-03-13 | Stop reason: HOSPADM

## 2019-03-13 RX ORDER — OXYCODONE HYDROCHLORIDE 5 MG/1
5-10 TABLET ORAL
Qty: 60 TAB | Refills: 0 | Status: SHIPPED | OUTPATIENT
Start: 2019-03-13 | End: 2019-03-18

## 2019-03-13 RX ORDER — PROPOFOL 10 MG/ML
INJECTION, EMULSION INTRAVENOUS AS NEEDED
Status: DISCONTINUED | OUTPATIENT
Start: 2019-03-13 | End: 2019-03-13 | Stop reason: HOSPADM

## 2019-03-13 RX ADMIN — TRANEXAMIC ACID 1 G: 100 INJECTION, SOLUTION INTRAVENOUS at 13:31

## 2019-03-13 RX ADMIN — HYDROMORPHONE HYDROCHLORIDE 0.2 MG: 2 INJECTION INTRAMUSCULAR; INTRAVENOUS; SUBCUTANEOUS at 14:40

## 2019-03-13 RX ADMIN — SODIUM CHLORIDE, SODIUM LACTATE, POTASSIUM CHLORIDE, AND CALCIUM CHLORIDE 1000 ML: 600; 310; 30; 20 INJECTION, SOLUTION INTRAVENOUS at 12:46

## 2019-03-13 RX ADMIN — ROCURONIUM BROMIDE 50 MG: 10 INJECTION, SOLUTION INTRAVENOUS at 13:29

## 2019-03-13 RX ADMIN — CEFAZOLIN SODIUM 3 G: 10 INJECTION, POWDER, FOR SOLUTION INTRAVENOUS at 13:51

## 2019-03-13 RX ADMIN — SODIUM CHLORIDE, SODIUM LACTATE, POTASSIUM CHLORIDE, AND CALCIUM CHLORIDE 125 ML/HR: 600; 310; 30; 20 INJECTION, SOLUTION INTRAVENOUS at 12:46

## 2019-03-13 RX ADMIN — PROPOFOL 100 MG: 10 INJECTION, EMULSION INTRAVENOUS at 13:25

## 2019-03-13 RX ADMIN — HYDROMORPHONE HYDROCHLORIDE 0.2 MG: 2 INJECTION INTRAMUSCULAR; INTRAVENOUS; SUBCUTANEOUS at 14:30

## 2019-03-13 RX ADMIN — ONDANSETRON 4 MG: 2 INJECTION INTRAMUSCULAR; INTRAVENOUS at 13:54

## 2019-03-13 RX ADMIN — LIDOCAINE HYDROCHLORIDE 100 MG: 20 INJECTION, SOLUTION EPIDURAL; INFILTRATION; INTRACAUDAL; PERINEURAL at 13:21

## 2019-03-13 RX ADMIN — HYDROMORPHONE HYDROCHLORIDE 0.2 MG: 2 INJECTION INTRAMUSCULAR; INTRAVENOUS; SUBCUTANEOUS at 14:35

## 2019-03-13 RX ADMIN — KETAMINE HYDROCHLORIDE 30 MG: 10 INJECTION, SOLUTION INTRAMUSCULAR; INTRAVENOUS at 13:15

## 2019-03-13 RX ADMIN — PROPOFOL 200 MG: 10 INJECTION, EMULSION INTRAVENOUS at 13:21

## 2019-03-13 RX ADMIN — SODIUM CHLORIDE, SODIUM LACTATE, POTASSIUM CHLORIDE, AND CALCIUM CHLORIDE: 600; 310; 30; 20 INJECTION, SOLUTION INTRAVENOUS at 13:37

## 2019-03-13 RX ADMIN — PROPOFOL 200 MG: 10 INJECTION, EMULSION INTRAVENOUS at 13:29

## 2019-03-13 RX ADMIN — KETAMINE HYDROCHLORIDE 20 MG: 10 INJECTION, SOLUTION INTRAMUSCULAR; INTRAVENOUS at 13:54

## 2019-03-13 RX ADMIN — DEXAMETHASONE SODIUM PHOSPHATE 4 MG: 4 INJECTION, SOLUTION INTRAMUSCULAR; INTRAVENOUS at 13:05

## 2019-03-13 RX ADMIN — ACETAMINOPHEN 1000 MG: 500 TABLET, FILM COATED ORAL at 13:03

## 2019-03-13 NOTE — PERIOP NOTES
Patient placed on [employer] Paws for a minimum of 30 min in  Preop. Pt. Used restroom in pre-op area with assistance.

## 2019-03-13 NOTE — ANESTHESIA PREPROCEDURE EVALUATION
Anesthetic History   No history of anesthetic complications            Review of Systems / Medical History  Patient summary reviewed, nursing notes reviewed and pertinent labs reviewed    Pulmonary  Within defined limits                 Neuro/Psych   Within defined limits           Cardiovascular    Hypertension              Exercise tolerance: >4 METS     GI/Hepatic/Renal     GERD           Endo/Other    Diabetes    Morbid obesity and arthritis     Other Findings              Physical Exam    Airway  Mallampati: III  TM Distance: 4 - 6 cm  Neck ROM: decreased range of motion   Mouth opening: Normal     Cardiovascular  Regular rate and rhythm,  S1 and S2 normal,  no murmur, click, rub, or gallop  Rhythm: regular  Rate: normal         Dental  No notable dental hx       Pulmonary  Breath sounds clear to auscultation               Abdominal  GI exam deferred       Other Findings            Anesthetic Plan    ASA: 3  Anesthesia type: general          Induction: Intravenous  Anesthetic plan and risks discussed with: Patient

## 2019-03-13 NOTE — PERIOP NOTES
TRANSFER - IN REPORT:    Verbal report received from ORN & CRNA on Ron Henson  being received from OR (unit) for routine post - op      Report consisted of patients Situation, Background, Assessment and   Recommendations(SBAR). Information from the following report(s) SBAR was reviewed with the receiving nurse. Opportunity for questions and clarification was provided. Assessment completed upon patients arrival to unit and care assumed.

## 2019-03-13 NOTE — DISCHARGE INSTRUCTIONS
Harley Bunn III, MD Eliane Pass, PA-C    Lower Extremity Surgery  Discharge Instructions      Please take the time to review the following instructions before you leave the hospital and use them as guidelines during your recovery from surgery. If you have any questions you may contact my office at [phone]. Wound Care/Dressing Changes:    []   Keep the surgical dressing on for two days from the day of your surgery. Once you remove this, no dressing is necessary if there is no drainage.      []   You may change your dressing as needed. Beginning the 2 days after you are discharged from the hospital you can change your dressing daily. A big, bulky dressing isn't necessary as long as there isn't any drainage from the incisions. You can put a band-aid or a pice of gauze over each incision and wear an ACE bandage as needed for comfort and swelling. [x]   Don't remove your dressing or get them wet. Please call the office to arrange for wound care treatment. You will have an appointment scheduled for tomorrow. Please do not remove dressings until being evaluated by wound care. · It isn't necessary to apply antibiotic ointment to your incisions. Sutures will be removed at your one week post-op visit. Staples (if you have them) are removed in two weeks. If you have steri-strips over your incision they will start to peel off in 7-10 days as you get them wet. They don't need to be removed prior to that. When they begin to peel off you can remove them. They should all be removed by 14 days from your surgery. If your wound is closed with Dermabond nothing has to be done or removed. Showering/Bathing:    []   You may shower 2 days after surgery. Your dressing may be removed for showering. You may get your incisions wet in the shower. Don't vigorously scrub the area where your incisions are. Apply a clean, dry dressing after drying off the area of your incisions.   Don't take a tub bath, get in a swimming pool or Jacuzzi until the incisions are completely healed. Do not soak your incisions under water. []   Do not get the dressing wet. Once you remove it two days from surgery, you may get the incision wet if there is no drainage. Weight Bearing Status/Braces/Activity:    [x]   You may walk as tolerated and perform your normal daily activities. Use crutches, a walker, or a cane only if you need them. You should strive to achieve full range of motion in your knee as soon as possible. Please start using a stationary bike or walking for exercise 3 or 4 days after surgery. We would like for you to return to your normal activities as soon as possible. If you feel comfortable returning to work, you may do so at any time.    []   Weight bearing as tolerated with the knee immobilizer in place. Use crutches, cane, or walker as needed. You should sleep in your knee immobilizer. []   Non-weight bearing. Please do not bear any weight on your leg. You may use your toes for balance when walking with a cane or crutches. Ice/Elevation:    Continue ice and elevation consistently for 48 hours after surgery. When elevating your knee elevate it on about 4 pillows to be sure it is above your heart. After 48 hours, you should ice your knee 3 times per day, for 20 minutes at a time for the next 5 days. After one week from surgery, you may use ice and elevation as needed for pain and swelling. Diet:    You may advance to your regular diet as tolerated. Medication:    1. You will be given a prescription for pain medications when you are discharged from the hospital.  Take the medication as needed according to the directions on the prescription bottle. Possible side effects of the medication include dizziness, headache, nausea, vomiting, constipation and urinary retention. If you experience any of these side effects call the office so that we can assist you in relieving them.   Discontinue the use of the pain medication if you develop itching, rash, shortness of breath or difficulties swallowing. If these symptoms become severe or aren't relieved by discontinuing the medication you should seek immediate medical attention. Refills of pain medication are authorized during office hours only (8AM - 5PM Mon thru Fri). 2. If you were prescribed Percocet/oxycodone or Dilaudid/hydromorphone you must have a written prescription. These medications legally cannot be called in to a pharmacy. 3. You may take over the counter Ibuprofen/Advil/Aleve between dosages of your pain medication if needed. Do not take Tylenol in addition to your pain medication as most of the pain medication already contains Tylenol. Exceptions include Dilaudid/hydromorphone, Demerol/meperidine and roxicodone. Do not exceed 3000 mg of Tylenol per day. Ex:  (hydrocodon 5/325mg = 325 mg of Tylenol)   4. If you have had a joint replacement you should take Xarelto 10 mg daily for 28 days from the date of your surgery. This will help to prevent blood clots from forming in your legs. 5. You may resume the medication you were taking prior to your surgery. Pain medication may change the effects of any antidepressant medication. If you have any questions about possible interactions between your regular medications and the pain medication you should consult the physician who prescribes your regular medications. Follow Up Appointment:    If you are unsure of your follow-up appointment date and time, please call [phone]. Please let our  know you are scheduling a post-op appointment. Most appointments should be between 7-14 days after your surgery. Physical Therapy:    []   Physical therapy will be discussed with you at your first follow-up appointment with Dr. Samir Garcia. You don't need to begin physical therapy prior to that visit.  You are to participate with [address] [place] [address] as arranged pre-operatively in the convience of your own home. []   Physical therapy will be discussed with you at your first follow-up appointment with Dr. Kyle Shafer. You don't need to begin physical therapy prior to that visit. []   If you already have a therapy appointment, please be sure to attend your sessions as scheduled. If you do not have physical therapy scheduled, please call Dr. Kyle Shafer' office to set up your first appointment as soon as possible. [x]   You do not require Physical Therapy. Important signs and symptoms:    If any of the following signs and symptoms occurs, you should contact Dr. Kyle Shafer' office. Please be advised if a problem arises which you feel required immediate medical attention or your are unable to contact Dr. Kyle Shafer' office you should seek immediate medical attention. Signs and symptoms to watch for include:  1. A sudden increase in swelling and/or redness or warmth at the area your surgery was performed which isn't relieved by rest, ice and elevation. 2. Oral temperature greater than 101.5 degrees for 12 hours or more which isn't relieved by an increase in fluid intake and taking two Tylenol every 4-6 hours. Do not exceed 3000 mg of Tylenol per day. 3. Excessive drainage from your incisions or drainage that hasn't stopped by 72 hours. 4. Calf pian, tenderness, redness or swelling which isn't relieved with rest and elevation. 5. Fever, chills, shortness of breath, chest pain, nausea, vomiting or other signs and symptoms which are of concern to you.

## 2019-03-13 NOTE — PERIOP NOTES
Patient discharged via wheelchair to POV. NAD. Caregiver verbalized understanding of all discharge instructions. Opportunity for questions offered.      Patient armband removed and shredded

## 2019-03-13 NOTE — INTERVAL H&P NOTE
H&P Update:  Prerna Bolton was seen and examined. History and physical has been reviewed. The patient has been examined. There have been no significant clinical changes since the completion of the originally dated History and Physical.  Patient identified by surgeon; surgical site was confirmed by patient and surgeon.     Signed By: Patel Herrera MD     March 13, 2019 12:43 PM

## 2019-03-13 NOTE — ANESTHESIA POSTPROCEDURE EVALUATION
Post-Anesthesia Evaluation and Assessment    Cardiovascular Function/Vital Signs  Visit Vitals  /72 (BP 1 Location: Left arm, BP Patient Position: At rest)   Pulse 76   Temp 36.2 °C (97.1 °F)   Resp 15   Ht 5' 10\" (1.778 m)   Wt 138.2 kg (304 lb 9.6 oz)   SpO2 96%   BMI 43.71 kg/m²       Patient is status post Procedure(s):  LEFT HIP SUPERFICIAL  INCISION AND DRAINAGE. Nausea/Vomiting: Controlled. Postoperative hydration reviewed and adequate. Pain:  Pain Scale 1: Numeric (0 - 10) (03/13/19 1450)  Pain Intensity 1: 2 (03/13/19 1450)   Managed. Neurological Status:   Neuro (WDL): Within Defined Limits (03/13/19 1450)   At baseline. Mental Status and Level of Consciousness: Arousable. Pulmonary Status:   O2 Device: Room air (03/13/19 1446)   Adequate oxygenation and airway patent. Complications related to anesthesia: None    Post-anesthesia assessment completed. No concerns. Patient has met all discharge requirements.     Signed By: Leslie Lackey CRNA    March 13, 2019

## 2019-03-14 ENCOUNTER — HOSPITAL ENCOUNTER (OUTPATIENT)
Dept: WOUND CARE | Age: 59
Discharge: HOME | End: 2019-03-14
Payer: COMMERCIAL

## 2019-03-14 VITALS — OXYGEN SATURATION: 98 % | HEART RATE: 86 BPM | RESPIRATION RATE: 20 BRPM | TEMPERATURE: 98.7 F

## 2019-03-14 PROCEDURE — 99201 HC NEW PT LEVEL I: CPT

## 2019-03-14 NOTE — WOUND CARE
03/14/19 1438   Wound Hip Anterior; Left   Date First Assessed/Time First Assessed: 03/13/19 1402   POA: No  Wound Type: Open incision/surgical site  Location: Hip  Orientation: Anterior; Left   Dressing Status  Breakthrough drainage; Intact   Dressing Type  Absorptive   Non-Pressure Injury Full thickness (subcut/muscle)   Wound Length (cm) 8 cm   Wound Width (cm) 3 cm   Wound Depth (cm) 2.5   Wound Surface area (cm^2) 24 cm^2   Tissue Type Percent Pink 50   Tissue Type Percent Red 50   Drainage Amount  Small    Drainage Color Serosanguinous   Wound Odor None   Periwound Skin Condition Intact; Erythema, blanchable   Cleansing and Cleansing Agents  (vashe)   Dressing Type Applied (aquacel ag,4x4,mextra,hypafix tape)   Procedure Tolerated Well

## 2019-03-14 NOTE — OP NOTES
[facility]    Name:  Soren Chery  MR#:   [id number]  :  1960  ACCOUNT #:  [de-identified]  DATE OF SERVICE:  2019    OUTPATIENT SURGERY    PREOPERATIVE DIAGNOSIS:  Left probable superficial hip infection/status post left total hip arthroplasty four weeks ago. POSTOPERATIVE DIAGNOSIS:  Left hip superficial infection. PROCEDURE PERFORMED:  Left hip superficial infection incision and drainage. SURGEON:  Nieves Middleton III, MD    FIRST ASSISTANT:  Brittani Villarreal. NEHA Alvarado    ANESTHESIA:  General.    COMPLICATIONS:  None. SPECIMENS REMOVED:  20cc. IMPLANTS:  none. ESTIMATED BLOOD LOSS:  20 mL. INDICATIONS:  This is a 70-year-old white male approximately one month status post a left direct anterior  total hip arthroplasty. The patient had some discharge starting just about a week ago in the superficial superior part of his wound. He was placed on some oral antibiotics, and the drainage has maintained itself over the last 7 days without any resolution. The patient is now brought to the operating room for irrigation and debridement and exploration of possibility of a deep wound infection. PROCEDURE:  The patient was brought into the operating theater and after adequate anesthesia, he was put on the [place] table. The left hip was prepped and draped in the typical sterile fashion for direct anterior hip surgery. The old incision was then opened from proximal to distal, and there was an obvious initiation of the collection of fluids superiorly. There was a small punctate area distally, but it was the superior part that was more connected to the space.   Once I opened this up, the drainage area was all superficial.  The previous running repair of the fascia of the tensor muscle group was fully intact with a full probing from proximal to distal.  The pockets went a little posterior about 3 cm and distally, the soft tissues adhered back down and medially, there was really no pocket either. It was all lateral and posterior and slightly superior. The Simpulse lavage with a weak bacitracin solution was irrigated for 500 mL, and then this was washed out with another 500 mL of saline and the dilute povidone solution was placed on a Yany and the kidney basin on the back table. Using a Craft, any sort of dead tissue was removed, scraping it gently. There was a good bleeding fascia with good bleeding subcutaneous tissue. There was no necrotic fat. The wound was then packed with a [employer] and dressed with a couple of ABDs and tape. Superficial cultures were obtained, they were aerobic and anaerobic. The patient was then awoke from general anesthesia, was put on his stretcher, and returned to the recovery room in awake and stable condition. All instruments, sponge, and needle counts were correct.         Benito Garrett MD      [id number]  D:  03/13/2019 14:11  T:  03/14/2019 2:28  JOB #:  [id number]

## 2019-03-17 LAB
BACTERIA SPEC CULT: ABNORMAL
BACTERIA SPEC CULT: ABNORMAL
GRAM STN SPEC: ABNORMAL
GRAM STN SPEC: ABNORMAL
SERVICE CMNT-IMP: ABNORMAL

## 2019-03-18 ENCOUNTER — HOSPITAL ENCOUNTER (OUTPATIENT)
Dept: WOUND CARE | Age: 59
Discharge: HOME | End: 2019-03-18
Payer: COMMERCIAL

## 2019-03-18 VITALS
HEIGHT: 70 IN | BODY MASS INDEX: 43.67 KG/M2 | HEART RATE: 78 BPM | WEIGHT: 305 LBS | DIASTOLIC BLOOD PRESSURE: 78 MMHG | SYSTOLIC BLOOD PRESSURE: 182 MMHG | OXYGEN SATURATION: 100 % | TEMPERATURE: 98.1 F | RESPIRATION RATE: 19 BRPM

## 2019-03-18 LAB
BACTERIA SPEC CULT: NORMAL
SERVICE CMNT-IMP: NORMAL

## 2019-03-18 PROCEDURE — 97607 NEG PRS WND THR NDME<=50SQCM: CPT

## 2019-03-18 NOTE — DISCHARGE INSTRUCTIONS
For Home Care/Self Care  Keep dressing dry and intact when bathing    Leave dressings in place until next visit    Patient to return for wound care in: [address] IF UNABLE TO [facility]. Inspect your wounds, looking for signs of infection which may include the following:  Increase in redness  Red \"streaks\" from wound  Increase in swelling  Fever  Unusual odor  Change in the amount of wound drainage. Should you experience any significant changes in your wound(s) or have any questions regarding your home care instructions please contact the wound center or your home health company. If after regular business hours, please call your family doctor or local emergency room. Edema Control:   Elevate legs as much as possible. Avoid standing in one position for more than 10 minutes. Avoid setting with legs down. Do not cross legs while sitting. Off-Loading:     Frequent position changes. Do not cross legs while sitting. Shift weight every 20 minutes or more when sitting for prolonged periods of time.

## 2019-03-18 NOTE — WOUND CARE
03/18/19 1435   Wound Hip Anterior; Left   Date First Assessed/Time First Assessed: 03/13/19 1402   POA: No  Wound Type: Open incision/surgical site  Location: Hip  Orientation: Anterior; Left   Dressing Status  Breakthrough drainage   Dressing Type  Absorptive   Non-Pressure Injury Full thickness (subcut/muscle)   Wound Length (cm) 7.4 cm   Wound Width (cm) 2.8 cm   Wound Depth (cm) 3   Wound Surface area (cm^2) 20.72 cm^2   Change in Wound Size % 13.67   Condition of Base Pink;Granulation; Adipose exposed   Condition of Edges Open   Tissue Type Pink;Red;Yellow   Direction of Undermining 11    oclock;3    oclock   Depth of Undermining (cm) (1)   Drainage Amount  Moderate   Drainage Color Serosanguinous; Tan   Wound Odor None   Periwound Skin Condition Intact   Cleansing and Cleansing Agents  Other (comment); Soap and water  (vashe)   Dressing Type Applied Other (Comment)  (Black foam (x2), drape to periwound, activac kci)   Procedure Tolerated Well

## 2019-03-21 ENCOUNTER — HOSPITAL ENCOUNTER (OUTPATIENT)
Dept: WOUND CARE | Age: 59
Discharge: HOME | End: 2019-03-21
Payer: COMMERCIAL

## 2019-03-21 VITALS
SYSTOLIC BLOOD PRESSURE: 161 MMHG | RESPIRATION RATE: 18 BRPM | DIASTOLIC BLOOD PRESSURE: 83 MMHG | TEMPERATURE: 98.6 F | HEART RATE: 76 BPM | OXYGEN SATURATION: 98 %

## 2019-03-21 PROCEDURE — 97605 NEG PRS WND THER DME<=50SQCM: CPT

## 2019-03-21 NOTE — WOUND CARE
03/21/19 1508   Wound Hip Anterior; Left   Date First Assessed/Time First Assessed: 03/13/19 1402   POA: No  Wound Type: Open incision/surgical site  Location: Hip  Orientation: Anterior; Left   Dressing Status  Intact   Dressing Type  Negative pressure wound therapy   Non-Pressure Injury Full thickness (subcut/muscle)   Wound Length (cm) 7 cm   Wound Width (cm) 2.5 cm   Wound Depth (cm) 1.5   Wound Surface area (cm^2) 17.5 cm^2   Change in Wound Size % 27.08   Condition of Base Pink;Granulation; Adipose exposed   Condition of Edges Open   Tissue Type Pink;Red;Yellow   Direction of Undermining 1    oclock;5    oclock   Depth of Undermining (cm) 0.5   Drainage Amount  Moderate   Drainage Color Serosanguinous   Wound Odor None   Periwound Skin Condition Erythema, blanchable; Intact   Cleansing and Cleansing Agents    (vashe)   Dressing Type Applied Negative pressure wound therapy  (black foam x2 pieces, drape)   Procedure Tolerated Well

## 2019-03-22 ENCOUNTER — HOSPITAL ENCOUNTER (OUTPATIENT)
Dept: WOUND CARE | Age: 59
Discharge: HOME | End: 2019-03-22
Payer: COMMERCIAL

## 2019-03-22 VITALS
RESPIRATION RATE: 16 BRPM | DIASTOLIC BLOOD PRESSURE: 89 MMHG | TEMPERATURE: 98.1 F | OXYGEN SATURATION: 97 % | HEART RATE: 74 BPM | SYSTOLIC BLOOD PRESSURE: 124 MMHG

## 2019-03-22 PROCEDURE — 97605 NEG PRS WND THER DME<=50SQCM: CPT

## 2019-03-25 ENCOUNTER — HOSPITAL ENCOUNTER (OUTPATIENT)
Dept: WOUND CARE | Age: 59
Discharge: HOME | End: 2019-03-25
Payer: COMMERCIAL

## 2019-03-25 VITALS
HEART RATE: 76 BPM | RESPIRATION RATE: 20 BRPM | TEMPERATURE: 98.1 F | DIASTOLIC BLOOD PRESSURE: 93 MMHG | SYSTOLIC BLOOD PRESSURE: 153 MMHG | OXYGEN SATURATION: 96 %

## 2019-03-25 PROCEDURE — 97605 NEG PRS WND THER DME<=50SQCM: CPT

## 2019-03-25 NOTE — WOUND CARE
03/25/19 1536   Wound Hip Anterior; Left   Date First Assessed/Time First Assessed: 03/13/19 1402   POA: No  Wound Type: Open incision/surgical site  Location: Hip  Orientation: Anterior; Left   Dressing Status  Intact   Dressing Type  Negative pressure wound therapy   Non-Pressure Injury Full thickness (subcut/muscle)   Wound Length (cm) 6.5 cm   Wound Width (cm) 2.5 cm   Wound Depth (cm) 1.5   Wound Surface area (cm^2) 16.25 cm^2   Change in Wound Size % 32.29   Condition of Base Adipose exposed;Pink;Granulation   Condition of Edges Open   Tissue Type Pink;Red;Yellow   Direction of Undermining 2    oclock;5    oclock   Depth of Undermining (cm) 0.5   Drainage Amount  Moderate   Drainage Color Serosanguinous   Wound Odor None   Periwound Skin Condition Erythema, blanchable   Cleansing and Cleansing Agents    (foam wash,vashe)   Dressing Type Applied Negative pressure wound therapy   Procedure Tolerated Well

## 2019-03-28 ENCOUNTER — HOSPITAL ENCOUNTER (OUTPATIENT)
Dept: WOUND CARE | Age: 59
Discharge: HOME | End: 2019-03-28
Payer: COMMERCIAL

## 2019-03-28 VITALS
RESPIRATION RATE: 20 BRPM | OXYGEN SATURATION: 99 % | HEART RATE: 90 BPM | TEMPERATURE: 98.7 F | DIASTOLIC BLOOD PRESSURE: 73 MMHG | SYSTOLIC BLOOD PRESSURE: 149 MMHG

## 2019-03-28 PROCEDURE — 97605 NEG PRS WND THER DME<=50SQCM: CPT

## 2019-03-28 NOTE — WOUND CARE
03/28/19 1138   Wound Hip Anterior; Left   Date First Assessed/Time First Assessed: 03/13/19 1402   POA: No  Wound Type: Open incision/surgical site  Location: Hip  Orientation: Anterior; Left   Dressing Status  Intact   Non-Pressure Injury Full thickness (subcut/muscle)   Wound Length (cm) 6.5 cm   Wound Width (cm) 2.5 cm   Wound Depth (cm) 1.4   Wound Surface area (cm^2) 16.25 cm^2   Change in Wound Size % 32.29   Condition of Base Adipose exposed;Pink   Condition of Edges Open   Tissue Type Pink;Red;Yellow   Direction of Undermining 2    oclock;5    oclock   Depth of Undermining (cm) 0.5   Drainage Amount  Small    Drainage Color Serosanguinous   Wound Odor None   Periwound Skin Condition Erythema, blanchable   Cleansing and Cleansing Agents    (foam wash,vashe)   Dressing Type Applied Negative pressure wound therapy   Procedure Tolerated Well

## 2019-04-01 ENCOUNTER — HOSPITAL ENCOUNTER (OUTPATIENT)
Dept: WOUND CARE | Age: 59
Discharge: HOME | End: 2019-04-01
Payer: COMMERCIAL

## 2019-04-01 VITALS
SYSTOLIC BLOOD PRESSURE: 149 MMHG | DIASTOLIC BLOOD PRESSURE: 79 MMHG | HEART RATE: 79 BPM | TEMPERATURE: 98.7 F | OXYGEN SATURATION: 98 % | RESPIRATION RATE: 20 BRPM

## 2019-04-01 PROCEDURE — 97605 NEG PRS WND THER DME<=50SQCM: CPT

## 2019-04-01 NOTE — WOUND CARE
04/01/19 1612   Wound Hip Anterior; Left   Date First Assessed/Time First Assessed: 03/13/19 1402   POA: No  Wound Type: Open incision/surgical site  Location: Hip  Orientation: Anterior; Left   Dressing Status  Intact   Dressing Type  Negative pressure wound therapy   Non-Pressure Injury Full thickness (subcut/muscle)   Wound Length (cm) 6 cm   Wound Width (cm) 2.2 cm   Wound Depth (cm) 1   Wound Surface area (cm^2) 13.2 cm^2   Change in Wound Size % 45   Condition of Base Adipose exposed;Pink   Condition of Edges Open   Tissue Type Pink;Red   Direction of Undermining 2    oclock;5    oclock   Depth of Undermining (cm) 2   Drainage Amount  Moderate   Drainage Color Serosanguinous   Wound Odor None   Periwound Skin Condition Erythema, blanchable   Cleansing and Cleansing Agents    (foam wash,vashe)   Dressing Type Applied Negative pressure wound therapy   Procedure Tolerated Well

## 2019-04-04 ENCOUNTER — HOSPITAL ENCOUNTER (OUTPATIENT)
Dept: WOUND CARE | Age: 59
Discharge: HOME | End: 2019-04-04
Payer: COMMERCIAL

## 2019-04-04 VITALS
TEMPERATURE: 98.7 F | DIASTOLIC BLOOD PRESSURE: 79 MMHG | SYSTOLIC BLOOD PRESSURE: 133 MMHG | RESPIRATION RATE: 18 BRPM | OXYGEN SATURATION: 99 % | HEART RATE: 72 BPM

## 2019-04-04 PROCEDURE — 97605 NEG PRS WND THER DME<=50SQCM: CPT

## 2019-04-04 PROCEDURE — 99211 OFF/OP EST MAY X REQ PHY/QHP: CPT

## 2019-04-04 NOTE — DISCHARGE INSTRUCTIONS
Leave dressings in place until next visit    Patient to return for wound care in:  3 Days    PLEASE CONTACT OFFICE AS SOON AS POSSIBLE IF UNABLE TO MAKE THIS APPOINTMENT. Inspect your wounds, looking for signs of infection which may include the following:  Increase in redness  Red \"streaks\" from wound  Increase in swelling  Fever  Unusual odor  Change in the amount of wound drainage. Should you experience any significant changes in your wound(s) or have any questions regarding your home care instructions please contact the wound center or your home health company. If after regular business hours, please call your family doctor or local emergency room. Edema Control:   Elevate legs as much as possible. Avoid standing in one position for more than 10 minutes. Avoid setting with legs down. Do not cross legs while sitting. Off-Loading:     Frequent position changes. Do not cross legs while sitting. Shift weight every 20 minutes or more when sitting for prolonged periods of time.     se orders to cover visit on 12/12/18

## 2019-04-04 NOTE — WOUND CARE
04/04/19 1100   Wound Hip Anterior; Left   Date First Assessed/Time First Assessed: 03/13/19 1402   POA: No  Wound Type: Open incision/surgical site  Location: Hip  Orientation: Anterior; Left   Dressing Status  Intact   Dressing Type  Negative pressure wound therapy   Non-Pressure Injury Full thickness (subcut/muscle)   Wound Length (cm) 6 cm   Wound Width (cm) 2.5 cm   Wound Depth (cm) 1.4   Wound Surface area (cm^2) 15 cm^2   Change in Wound Size % 37.5   Condition of Base Adipose exposed;Pink   Condition of Edges Open   Tissue Type Pink;Red   Direction of Undermining 2    oclock;5    oclock   Depth of Undermining (cm) 2.0   Drainage Amount  Moderate   Drainage Color Serosanguinous   Wound Odor None   Periwound Skin Condition Erythema, blanchable   Cleansing and Cleansing Agents    (Vashe wound cleanser)   Dressing Type Applied Negative pressure wound therapy   Procedure Tolerated Well        04/04/19 1100   Wound Hip Anterior; Left   Date First Assessed/Time First Assessed: 03/13/19 1402   POA: No  Wound Type: Open incision/surgical site  Location: Hip  Orientation: Anterior; Left   Dressing Status  Intact   Dressing Type  Negative pressure wound therapy   Non-Pressure Injury Full thickness (subcut/muscle)   Wound Length (cm) 6 cm   Wound Width (cm) 2.5 cm   Wound Depth (cm) 1.4   Wound Surface area (cm^2) 15 cm^2   Change in Wound Size % 37.5   Condition of Base Adipose exposed;Pink   Condition of Edges Open   Tissue Type Pink;Red   Direction of Undermining 2    oclock;5    oclock   Depth of Undermining (cm) 2.0   Drainage Amount  Moderate   Drainage Color Serosanguinous   Wound Odor None   Periwound Skin Condition Erythema, blanchable   Cleansing and Cleansing Agents    (Vashe wound cleanser)   Dressing Type Applied Negative pressure wound therapy   Procedure Tolerated Well

## 2019-04-08 ENCOUNTER — HOSPITAL ENCOUNTER (OUTPATIENT)
Dept: WOUND CARE | Age: 59
Discharge: HOME | End: 2019-04-08
Payer: COMMERCIAL

## 2019-04-08 VITALS
OXYGEN SATURATION: 98 % | HEART RATE: 75 BPM | RESPIRATION RATE: 19 BRPM | DIASTOLIC BLOOD PRESSURE: 90 MMHG | TEMPERATURE: 98 F | SYSTOLIC BLOOD PRESSURE: 144 MMHG

## 2019-04-08 PROCEDURE — 97605 NEG PRS WND THER DME<=50SQCM: CPT

## 2019-04-08 NOTE — WOUND CARE
04/08/19 1601   Wound Hip Anterior; Left   Date First Assessed/Time First Assessed: 03/13/19 1402   POA: No  Wound Type: Open incision/surgical site  Location: Hip  Orientation: Anterior; Left   Dressing Status  Intact   Dressing Type  Negative pressure wound therapy   Non-Pressure Injury Full thickness (subcut/muscle)   Wound Length (cm) 5 cm   Wound Width (cm) 1.8 cm   Wound Depth (cm) 1.5   Wound Surface area (cm^2) 9 cm^2   Change in Wound Size % 62.5   Condition of Base Adipose exposed;Pink;Slough   Condition of Edges Rolled/curled   Tissue Type Pink;Yellow   Direction of Undermining 2    oclock   Depth of Undermining (cm) 2.0   Drainage Amount  Moderate   Drainage Color Serosanguinous   Wound Odor None   Periwound Skin Condition Erythema, blanchable   Cleansing and Cleansing Agents  Normal saline   Dressing Type Applied Negative pressure wound therapy; Other (Comment)  (drape, black sponge,)   Procedure Tolerated Well

## 2019-04-08 NOTE — DISCHARGE INSTRUCTIONS
Leave dressings in place until next visit     Patient to return for wound care in:  3 Days     PLEASE CONTACT OFFICE AS SOON AS POSSIBLE IF UNABLE TO MAKE THIS APPOINTMENT. Inspect your wounds, looking for signs of infection which may include the following:  Increase in redness  Red \"streaks\" from wound  Increase in swelling  Fever  Unusual odor  Change in the amount of wound drainage. Should you experience any significant changes in your wound(s) or have any questions regarding your home care instructions please contact the wound center or your home health company. If after regular business hours, please call your family doctor or local emergency room. Edema Control:   Elevate legs as much as possible. Avoid standing in one position for more than 10 minutes. Avoid setting with legs down. Do not cross legs while sitting. Off-Loading:     Frequent position changes. Do not cross legs while sitting. Shift weight every 20 minutes or more when sitting for prolonged periods of time.      se orders to cover visit on 12/12/18 04/01/19 1132

## 2019-04-11 ENCOUNTER — HOSPITAL ENCOUNTER (OUTPATIENT)
Dept: CT IMAGING | Age: 59
Discharge: HOME | End: 2019-04-11
Attending: ORTHOPAEDIC SURGERY
Payer: COMMERCIAL

## 2019-04-11 ENCOUNTER — HOSPITAL ENCOUNTER (OUTPATIENT)
Dept: WOUND CARE | Age: 59
Discharge: HOME | End: 2019-04-11
Payer: COMMERCIAL

## 2019-04-11 VITALS
DIASTOLIC BLOOD PRESSURE: 89 MMHG | OXYGEN SATURATION: 97 % | TEMPERATURE: 98.3 F | SYSTOLIC BLOOD PRESSURE: 137 MMHG | HEART RATE: 82 BPM | RESPIRATION RATE: 19 BRPM

## 2019-04-11 DIAGNOSIS — M25.552 LEFT HIP PAIN: ICD-10-CM

## 2019-04-11 PROCEDURE — 73700 CT LOWER EXTREMITY W/O DYE: CPT

## 2019-04-11 PROCEDURE — 97605 NEG PRS WND THER DME<=50SQCM: CPT

## 2019-04-11 NOTE — WOUND CARE
04/11/19 1427   Wound Hip Anterior; Left   Date First Assessed/Time First Assessed: 03/13/19 1402   POA: No  Wound Type: Open incision/surgical site  Location: Hip  Orientation: Anterior; Left   Dressing Status  Intact   Dressing Type  Other (Comment)   Non-Pressure Injury Full thickness (subcut/muscle)   Wound Length (cm) 6.5 cm   Wound Width (cm) 0.6 cm   Wound Depth (cm) 0.6   Wound Surface area (cm^2) 3.9 cm^2   Change in Wound Size % 83.75   Condition of Base Adipose exposed;Pink   Condition of Edges Rolled/curled   Tissue Type Red;Yellow   Direction of Undermining 2    oclock   Depth of Undermining (cm) 2.0   Drainage Amount  Moderate   Drainage Color Serosanguinous   Wound Odor None   Periwound Skin Condition Erythema, blanchable   Cleansing and Cleansing Agents  Normal saline   Dressing Type Applied Negative pressure wound therapy  (drape, black sponge, white foam)   Procedure Tolerated Well

## 2019-04-11 NOTE — DISCHARGE INSTRUCTIONS
In wound care clinic today:04-  Cleanse wound with NS or soap and water or commercial wound cleanser  Apply the following topically to wound bed: Black foam  Apply the following to refugio-wound: Drape  Apply the following dressings: Activac set to 125 mmHg continuous

## 2019-04-17 ENCOUNTER — HOSPITAL ENCOUNTER (OUTPATIENT)
Dept: WOUND CARE | Age: 59
Discharge: HOME | End: 2019-04-17
Payer: COMMERCIAL

## 2019-04-17 VITALS
TEMPERATURE: 98 F | HEART RATE: 74 BPM | OXYGEN SATURATION: 97 % | DIASTOLIC BLOOD PRESSURE: 85 MMHG | SYSTOLIC BLOOD PRESSURE: 146 MMHG

## 2019-04-17 PROCEDURE — 99211 OFF/OP EST MAY X REQ PHY/QHP: CPT

## 2019-04-17 NOTE — WOUND CARE
04/17/19 1033   Wound Hip Anterior; Left   Date First Assessed/Time First Assessed: 03/13/19 1402   POA: No  Wound Type: Open incision/surgical site  Location: Hip  Orientation: Anterior; Left   Dressing Status  Intact   Dressing Type  Other (Comment)  (  Doctor stop wound therapy , tape, gauze )   Non-Pressure Injury Full thickness (subcut/muscle)   Wound Length (cm) 4.8 cm   Wound Width (cm) 1.5 cm   Wound Depth (cm) 1.7   Wound Surface area (cm^2) 7.2 cm^2   Change in Wound Size % 70   Condition of Base Granulation;Pink   Condition of Edges Rolled/curled   Tissue Type Pink;Red   Direction of Undermining 2    oclock   Depth of Undermining (cm) 2.0   Drainage Amount  Moderate   Drainage Color Serous   Wound Odor None   Periwound Skin Condition Erythema, blanchable   Cleansing and Cleansing Agents  Normal saline   Dressing Type Applied Other (Comment)  (ita, steri stripe, exudry, mepilex border hypafix tape.)   Procedure Tolerated Well

## 2019-04-17 NOTE — DISCHARGE INSTRUCTIONS
.Patient instructed to leave dressing in place until the day of or before his clinic appointment he can take a shower and place gauze tape over area.

## 2019-04-22 ENCOUNTER — HOSPITAL ENCOUNTER (OUTPATIENT)
Dept: WOUND CARE | Age: 59
Discharge: HOME | End: 2019-04-22
Payer: COMMERCIAL

## 2019-04-22 VITALS
RESPIRATION RATE: 20 BRPM | DIASTOLIC BLOOD PRESSURE: 87 MMHG | TEMPERATURE: 99 F | OXYGEN SATURATION: 97 % | SYSTOLIC BLOOD PRESSURE: 137 MMHG | HEART RATE: 70 BPM

## 2019-04-22 PROCEDURE — 99211 OFF/OP EST MAY X REQ PHY/QHP: CPT

## 2019-04-22 NOTE — DISCHARGE INSTRUCTIONS
For Home Care/Self Care  Keep dressing dry and intact when bathing    Leave dressings in place until next visit    Patient to return for wound care in: 3  Days; patient to follow up with Dr. Holland Murillo next Wednesday     PLEASE CONTACT OFFICE AS SOON AS POSSIBLE IF UNABLE TO MAKE THIS APPOINTMENT. Inspect your wounds, looking for signs of infection which may include the following:  Increase in redness  Red \"streaks\" from wound  Increase in swelling  Fever  Unusual odor  Change in the amount of wound drainage. Should you experience any significant changes in your wound(s) or have any questions regarding your home care instructions please contact the wound center or your home health company. If after regular business hours, please call your family doctor or local emergency room. Edema Control:   Elevate legs as much as possible. Avoid standing in one position for more than 10 minutes. Avoid setting with legs down. Do not cross legs while sitting. Off-Loading:     Frequent position changes. Do not cross legs while sitting. Shift weight every 20 minutes or more when sitting for prolonged periods of time.

## 2019-04-22 NOTE — WOUND CARE
04/22/19 1137   Wound Hip Anterior; Left   Date First Assessed/Time First Assessed: 03/13/19 1402   POA: No  Wound Type: Open incision/surgical site  Location: Hip  Orientation: Anterior; Left   Dressing Status  Removed; Other (comment)  (Patient stated wife changed yesterday)   Dressing Type  Absorptive   Non-Pressure Injury Full thickness (subcut/muscle)   Wound Length (cm) 5 cm   Wound Width (cm) 1.5 cm   Wound Depth (cm) 1   Wound Surface area (cm^2) 7.5 cm^2   Change in Wound Size % 68.75   Condition of Base Granulation;Pink   Condition of Edges Rolled/curled   Tissue Type Pink;Red   Direction of Tunnels 11  oclock;12  oclock   Depth of Tunnel/Sinus (cm) 1.7 cm   Drainage Amount  Large   Drainage Color Serosanguinous; Tan   Wound Odor Mild   Periwound Skin Condition Intact; Rash   Cleansing and Cleansing Agents  Other (comment); Soap and water  (vashe)   Dressing Type Applied Other (Comment)  (Isabel, Bacitracin, Exu-dry, Mepilex border, [place] to [employer])   Procedure Tolerated Well

## 2019-04-25 ENCOUNTER — HOSPITAL ENCOUNTER (OUTPATIENT)
Dept: WOUND CARE | Age: 59
Discharge: HOME | End: 2019-04-25
Payer: COMMERCIAL

## 2019-04-25 VITALS
OXYGEN SATURATION: 96 % | TEMPERATURE: 97.8 F | RESPIRATION RATE: 20 BRPM | SYSTOLIC BLOOD PRESSURE: 145 MMHG | DIASTOLIC BLOOD PRESSURE: 78 MMHG | HEART RATE: 96 BPM

## 2019-04-25 PROCEDURE — 99211 OFF/OP EST MAY X REQ PHY/QHP: CPT

## 2019-04-25 NOTE — DISCHARGE INSTRUCTIONS
For Home Care/Self Care   Keep dressing dry and intact when bathing     Leave dressings in place until next visit     Patient to return for wound care in: 3  Days; patient to follow up with Dr. Mando Hernandez next Wednesday     PLEASE CONTACT OFFICE AS SOON AS POSSIBLE IF UNABLE TO MAKE THIS APPOINTMENT. Inspect your wounds, looking for signs of infection which may include the following:  Increase in redness  Red \"streaks\" from wound  Increase in swelling  Fever  Unusual odor  Change in the amount of wound drainage. Should you experience any significant changes in your wound(s) or have any questions regarding your home care instructions please contact the wound center or your home health company. If after regular business hours, please call your family doctor or local emergency room. Edema Control:   Elevate legs as much as possible. Avoid standing in one position for more than 10 minutes. Avoid setting with legs down. Do not cross legs while sitting. Off-Loading:     Frequent position changes. Do not cross legs while sitting. Shift weight every 20 minutes or more when sitting for prolonged periods of time.     04/22/19 1003

## 2019-04-25 NOTE — WOUND CARE
04/25/19 1154   Wound Hip Anterior; Left   Date First Assessed/Time First Assessed: 03/13/19 1402   POA: No  Wound Type: Open incision/surgical site  Location: Hip  Orientation: Anterior; Left   Dressing Status  Breakthrough drainage; Intact; Old drainage   Dressing Type  Absorptive   Non-Pressure Injury Full thickness (subcut/muscle)   Wound Length (cm) 3.4 cm   Wound Width (cm) 1 cm   Wound Depth (cm) 1   Wound Surface area (cm^2) 3.4 cm^2   Change in Wound Size % 85.83   Condition of Base Granulation;Pink   Condition of Edges Rolled/curled   Tissue Type Pink;Red   Direction of Tunnels 11  oclock;12  oclock   Depth of Tunnel/Sinus (cm) 2 cm   Drainage Amount  Large   Drainage Color Serosanguinous; Tan   Wound Odor Mild   Periwound Skin Condition Intact; Rash   Cleansing and Cleansing Agents  Other (comment)  (vashe wound)   Dressing Type Applied Other (Comment)  (ita, steri stripe, ag, exurdry, hypafix tape, drape)

## 2019-04-29 ENCOUNTER — HOSPITAL ENCOUNTER (OUTPATIENT)
Dept: WOUND CARE | Age: 59
Discharge: HOME | End: 2019-04-29
Payer: COMMERCIAL

## 2019-04-29 VITALS
TEMPERATURE: 97.7 F | OXYGEN SATURATION: 93 % | SYSTOLIC BLOOD PRESSURE: 121 MMHG | HEART RATE: 66 BPM | RESPIRATION RATE: 17 BRPM | DIASTOLIC BLOOD PRESSURE: 80 MMHG

## 2019-04-29 PROCEDURE — 99211 OFF/OP EST MAY X REQ PHY/QHP: CPT

## 2019-04-29 NOTE — DISCHARGE INSTRUCTIONS
For Home Care/Self Care   Keep dressing dry and intact when bathing     Leave dressings in place until next visit     Patient to return for wound care in: 3  Days; patient to follow up with Dr. Karen Justin next Wednesday     PLEASE CONTACT OFFICE AS SOON AS POSSIBLE IF UNABLE TO MAKE THIS APPOINTMENT. Inspect your wounds, looking for signs of infection which may include the following:  Increase in redness  Red \"streaks\" from wound  Increase in swelling  Fever  Unusual odor  Change in the amount of wound drainage. Should you experience any significant changes in your wound(s) or have any questions regarding your home care instructions please contact the wound center or your home health company. If after regular business hours, please call your family doctor or local emergency room. Edema Control:   Elevate legs as much as possible. Avoid standing in one position for more than 10 minutes. Avoid setting with legs down. Do not cross legs while sitting. Off-Loading:     Frequent position changes. Do not cross legs while sitting. Shift weight every 20 minutes or more when sitting for prolonged periods of time.     04/22/19 4248

## 2019-05-01 ENCOUNTER — HOSPITAL ENCOUNTER (OUTPATIENT)
Dept: WOUND CARE | Age: 59
Discharge: HOME | End: 2019-05-01
Attending: HOSPITALIST
Payer: COMMERCIAL

## 2019-05-01 VITALS
SYSTOLIC BLOOD PRESSURE: 143 MMHG | OXYGEN SATURATION: 98 % | TEMPERATURE: 98.6 F | DIASTOLIC BLOOD PRESSURE: 95 MMHG | HEART RATE: 79 BPM | RESPIRATION RATE: 16 BRPM

## 2019-05-01 PROBLEM — S71.002A OPEN WOUND OF LEFT HIP: Status: ACTIVE | Noted: 2019-05-01

## 2019-05-01 PROCEDURE — 11042 DBRDMT SUBQ TIS 1ST 20SQCM/<: CPT

## 2019-05-01 NOTE — DISCHARGE INSTRUCTIONS
Patient has been instructed to only change dressing if become saturated,but only remove the absorbant dressing, leave medication in wound bed.

## 2019-05-01 NOTE — WOUND CARE
[facility]  Initial Consult note         Chief Complaint:  Shaniqua Zhong is a 64 y.o.  male who presents with left hip wound present since Feb 2013  Referred by:  Dr. Gerhardt Heaps    HPI:   He had left hip replacement on Feb 13, 2019. Complicated by infection and was revised on March 15, 2019. He had wound vac placed for 5 weeks. He received keflex for 6 weeks. Recent MRI did not show any infection of left hip  Wound caused by: non-healed surgical wound for   Current wound care:  Offloading wound: n/a  Appetite: good  Wound associated pain: mild  Diabetic: yes        Past Medical History:   Diagnosis Date    Bursitis of right shoulder     Chronic pain     right shoulder and lower back.  Diabetes ([facility] [address] 2015    GERD (gastroesophageal reflux disease)     Hypercholesterolemia     Hypertension 2015    Morbid obesity ([facility] [address]     1- BMI 46.1    OA (osteoarthritis)       Past Surgical History:   Procedure Laterality Date    HX GI      EGD, Colonoscopy    HX HEENT Bilateral     lasik eye surgery    HX KNEE ARTHROSCOPY Left 12/2017    HX ORTHOPAEDIC Left     CTR    HX OTHER SURGICAL      wisdom teeth extraction ,bone graft    HX OTHER SURGICAL       No family history on file. Social History     Tobacco Use    Smoking status: Current Some Day Smoker    Smokeless tobacco: Never Used    Tobacco comment: one cigar 3 x year   Substance Use Topics    Alcohol use: Yes     Alcohol/week: 3.6 oz     Types: 2 Glasses of wine, 2 Cans of beer, 2 Shots of liquor per week       Prior to Admission medications    Medication Sig Start Date End Date Taking? Authorizing Provider   aspirin 81 mg chewable tablet Take 1 Tab by mouth two (2) times a day. 2/14/19  Yes Schwizer, Janeal Gilford, PA-C   amLODIPine (NORVASC) 5 mg tablet Take 5 mg by mouth daily. Yes Provider, Historical   dulaglutide (TRULICITY) 1.5 SP/9.6 mL sub-q pen 1.5 mg by SubCUTAneous route Every Saturday.    Yes Provider, Historical fexofenadine (ALLEGRA) 180 mg tablet Take 180 mg by mouth daily. Yes Provider, Historical   metFORMIN (GLUCOPHAGE) 1,000 mg tablet Take 1,000 mg by mouth two (2) times daily (with meals). Yes Provider, Historical   ergocalciferol (ERGOCALCIFEROL) 50,000 unit capsule Take 50,000 Units by mouth every Monday. Yes Provider, Historical   irbesartan (AVAPRO) 150 mg tablet Take 150 mg by mouth two (2) times a day. Yes Provider, Historical   atorvastatin (LIPITOR) 20 mg tablet Take 20 mg by mouth nightly. Monday, Tuesday, Wednesday, Thursday, Friday    Yes Provider, Historical   zolpidem (AMBIEN) 5 mg tablet Take 5 mg by mouth nightly as needed for Sleep. Yes Provider, Historical   metaxalone (SKELAXIN) 800 mg tablet Take 800 mg by mouth every six (6) hours as needed for Pain. Yes Provider, Historical   amLODIPine (NORVASC) 2.5 mg tablet Take 2.5 mg by mouth every evening. Yes Provider, Historical   carvedilol (COREG) 12.5 mg tablet Take 12.5 mg by mouth two (2) times a day. Yes Provider, Historical   esomeprazole (NEXIUM) 20 mg capsule Take 20 mg by mouth daily. Yes Provider, Historical     Allergies   Allergen Reactions    Bydureon [Exenatide Microspheres] Other (comments)     Scar tissue under skin        Review of Systems  Gen: No fever, chills, malaise, weight loss/gain. Heent: No headache, rhinorrhea, epistaxis, ear pain, hearing loss, sinus pain, neck pain/stiffness, sore throat. Heart: No chest pain, palpitations, CORNELIUS, pnd, or orthopnea. Resp: No cough, hemoptysis, wheezing and shortness of breath. GI: No nausea, vomiting, diarrhea, constipation, melena or hematochezia. : No urinary obstruction, dysuria or hematuria. Derm: see above   Musc/skeletal: no bone or joint complains. Vasc: No edema, cyanosis or claudication. Endo: No heat/cold intolerance, no polyuria,polydipsia or polyphagia. Neuro: No unilateral weakness, numbness, tingling. No seizures.    Heme: No easy bruising or bleeding. Objective:     Physical Exam:     Visit Vitals  BP (!) 143/95   Pulse 79   Temp 98.6 °F (37 °C)   Resp 16   SpO2 98%     General: well developed, well nourished, pleasant , NAD. Hygiene good  Psych: cooperative. Pleasant. No anxiety or depression. Normal mood and affect. Neuro: alert and oriented to person/place/situation. Otherwise nonfocal.  Derm: Skin turgor for age, dry skin  HEENT: Normocephalic, atraumatic. EOMI. Conjunctiva clear. No scleral icterus. Neck: Normal range of motion. No masses. Chest: Good air entry bilaterally. Respirations nonlabored  Cardio[de-identified] Normal heart sounds,no rubs, murmurs or gallops  Abdomen: Soft, nontender, nondistended, normoactive bowel sounds  Lower extremities: color normal; temperature normal. Calves are supple, nontender. Capillary refill <3 sec  Focussed Lower Extremity Exam:      Wound Description:   Wound Length: 2.5 cm    Wound Width :1 cm    Wound Depth :1    Etiology: non healing surgical wound  Ulcer bed: Mixed Granular/Fibrotic    Periwound: Reddened  Exudate: Scant/small amount Serosanguinous exudate    Data Review:   No results found for this or any previous visit (from the past 24 hour(s)). Assessment:     Patient Active Problem List   Diagnosis Code    High cholesterol E78.00    HTN (hypertension) I10    Tear of medial meniscus of left knee, current S83.242A    Acute medial meniscus tear of left knee S83.242A    Osteoarthritis of left hip M16.12    Left hip postoperative wound infection T81.49XA    Postoperative wound infection of left hip T81.49XA    Open wound of left hip S71.002A     64 y.o. male with left hip open wound of left hip    Needs :  Serial debridement- debrided today- see note below  Good local wound care  Good Diabetic control  Plan:   Informed consent obtained. Patient/family member explained about the need of the procedure.   Consent in chart  Debridement:   Excisional debridement of left hip Indication: to remove necrotic tissue/ devitalized tissue/ soft eschar/ infected tissue through subcutaneous tissue epidermis and dermis layer of wound bed  Anesthesia: Topical 2% lidocaine jelly /  Instrument: Curette, Blade,    Residual Necrosis: NA  Bleeding: <1ml   Hemostasis: Pressure   Patient tolerated procedure well   Procedural Pain: mild  Post - procedural pain: mild    Post debridement measurements: 2.5 x 1 x  cm  Surface area debrided: 2.5 sq. cm        In wound care clinic today:  Cleanse wound with NS or soap and water or commercial wound cleanser  Apply the following topically to wound bed: Isabel  Apply the following to refugio-wound: NA  Apply the following dressings: Absorptive dressing    For Home Care/Self Care:  Cleanse wound with NS or soap and water or commercial wound cleanser  Keep dressing dry and intact when bathing  Apply the following to wound bed: Isabel  Apply the following to skin around wound: NA  Apply the following dressings: Absorptive dressing    Leave dressings in place until next visit    Patient to return for wound care in: 7  Days  Follow up with Nurse visit as recommended. PLEASE CONTACT OFFICE AS SOON AS POSSIBLE IF UNABLE TO MAKE THIS APPOINTMENT. Inspect your wounds, looking for signs of infection which may include the following:  Increase in redness  Red \"streaks\" from wound  Increase in swelling  Fever  Unusual odor  Change in the amount of wound drainage. Should you experience any significant changes in your wound(s) or have any questions regarding your home care instructions please contact the wound center or your home health company. If after regular business hours, please call your family doctor or local emergency room.       Signed By: Joanna Timmons MD     May 1, 2019

## 2019-05-01 NOTE — WOUND CARE
05/01/19 1438   Wound Hip Anterior; Left   Date First Assessed/Time First Assessed: 03/13/19 1402   POA: No  Wound Type: Open incision/surgical site  Location: Hip  Orientation: Anterior; Left   Dressing Status  Breakthrough drainage; Intact   Dressing Type  Absorptive   Non-Pressure Injury Full thickness (subcut/muscle)   Wound Length (cm) 2.5 cm   Wound Width (cm) 1 cm   Wound Depth (cm) 1   Wound Surface area (cm^2) 2.5 cm^2   Change in Wound Size % 89.58   Condition of Base Granulation   Condition of Edges Rolled/curled   Tissue Type Pink;Red   Direction of Tunnels 12  oclock   Depth of Tunnel/Sinus (cm)   (1.2)   Drainage Amount  Large   Drainage Color Clear   Wound Odor Mild   Periwound Skin Condition Intact   Cleansing and Cleansing Agents  Normal saline; Other (comment)   Dressing Type Applied Absorptive;Collagens/cell matrix  (Aquacel Ag, mepilex border, drape)   Wound Procedure Type Remove Slough   Procedure Time Out 1443   Consent Obtained  Yes   Procedure Bleeding Minimal   Procedure Hemostasis  Pressure   Type of Tissue Removed  Devitalized   Procedure Instrument  Curette   Procedure Pain Scale Numeric 0/10   Procedure Faces (Cook-Baker) Scale 1 0   Debridement Procedure Performed by Ana Wagner   Post-Procedure Length (cm) 2.5 cm   Post-Procedure Width (cm) 1.2 cm   Post-Procedure Depth (cm) 1 cm   Post-Procedure Volume (cm^3) 3 cm^3   Post-Procedure Surface Area (cm^2) 3 cm^2   Post Procedure Pain Scale Numeric 0/10   Post Procedure Faces (Cook-Baker) Scale 1 0   Assisted Physician in Procedure  Yes   Procedure Tolerated Well

## 2019-05-06 ENCOUNTER — HOSPITAL ENCOUNTER (OUTPATIENT)
Dept: WOUND CARE | Age: 59
Discharge: HOME | End: 2019-05-06
Attending: HOSPITALIST
Payer: COMMERCIAL

## 2019-05-06 VITALS
DIASTOLIC BLOOD PRESSURE: 90 MMHG | OXYGEN SATURATION: 99 % | RESPIRATION RATE: 16 BRPM | HEART RATE: 77 BPM | SYSTOLIC BLOOD PRESSURE: 147 MMHG | TEMPERATURE: 98.4 F

## 2019-05-06 PROCEDURE — 99211 OFF/OP EST MAY X REQ PHY/QHP: CPT

## 2019-05-06 NOTE — WOUND CARE
05/06/19 0856   Wound Hip Anterior; Left   Date First Assessed/Time First Assessed: 03/13/19 1402   POA: No  Wound Type: Open incision/surgical site  Location: Hip  Orientation: Anterior; Left   Dressing Status  Breakthrough drainage   Dressing Type  Absorptive   Non-Pressure Injury Full thickness (subcut/muscle)   Wound Length (cm) 2 cm   Wound Width (cm) 0.4 cm   Wound Depth (cm) 1   Wound Surface area (cm^2) 0.8 cm^2   Change in Wound Size % 96.67   Condition of Base Granulation   Condition of Edges Rolled/curled   Tissue Type Pink;Red   Direction of Tunnels 12  oclock   Depth of Tunnel/Sinus (cm) 1 cm   Drainage Amount  Small    Drainage Color Clear   Wound Odor Mild   Periwound Skin Condition Intact   Cleansing and Cleansing Agents  Normal saline   Dressing Type Applied Absorptive;Collagens/cell matrix  (bacitracin, exudry, drape, Aquacel Ag)   Wound Procedure Type Remove Slough   Procedure Tolerated Well

## 2019-05-13 ENCOUNTER — HOSPITAL ENCOUNTER (OUTPATIENT)
Dept: WOUND CARE | Age: 59
Discharge: HOME | End: 2019-05-13
Attending: HOSPITALIST
Payer: COMMERCIAL

## 2019-05-13 ENCOUNTER — APPOINTMENT (OUTPATIENT)
Dept: WOUND CARE | Age: 59
End: 2019-05-13
Payer: COMMERCIAL

## 2019-05-13 VITALS
OXYGEN SATURATION: 97 % | HEART RATE: 79 BPM | DIASTOLIC BLOOD PRESSURE: 88 MMHG | SYSTOLIC BLOOD PRESSURE: 137 MMHG | TEMPERATURE: 98 F | RESPIRATION RATE: 19 BRPM

## 2019-05-13 PROCEDURE — 11042 DBRDMT SUBQ TIS 1ST 20SQCM/<: CPT

## 2019-05-13 NOTE — WOUND CARE
05/13/19 0928   Wound Hip Anterior; Left   Date First Assessed/Time First Assessed: 03/13/19 1402   POA: No  Wound Type: Open incision/surgical site  Location: Hip  Orientation: Anterior; Left   Dressing Status  Breakthrough drainage   Dressing Type  Absorptive   Non-Pressure Injury Full thickness (subcut/muscle)   Wound Length (cm) 1 cm   Wound Width (cm) 0.5 cm   Wound Depth (cm) 0.3   Wound Surface area (cm^2) 0.5 cm^2   Change in Wound Size % 97.92   Condition of Base Granulation   Condition of Edges Rolled/curled   Tissue Type Pink;Red   Direction of Tunnels 12  oclock   Depth of Tunnel/Sinus (cm) 1.5 cm   Drainage Amount  Small    Drainage Color Clear   Wound Odor Mild   Periwound Skin Condition Intact   Cleansing and Cleansing Agents  Normal saline   Dressing Type Applied Absorptive;Collagens/cell matrix; Other (Comment)  (mepitel one,exudry,drape)   Wound Procedure Type Remove Slough   Procedure Time Out 0830   Consent Obtained  Yes   Procedure Bleeding Minimal   Procedure Hemostasis  N/A   Type of Tissue Removed  Devitalized   Procedure Instrument  Curette   Procedure Pain Scale Numeric 0/10   Procedure Faces (Cook-Baker) Scale 1 0   Debridement Procedure Performed by Dr. Feliciano Lamar   Post-Procedure Length (cm) 1.8 cm   Post-Procedure Width (cm) 0.4 cm   Post-Procedure Depth (cm) 0.5 cm   Post-Procedure Volume (cm^3) 0.36 cm^3   Post-Procedure Surface Area (cm^2) 0.72 cm^2   Post Procedure Pain Scale Numeric 0/10   Post Procedure Faces (Cook-Baker) Scale 1 0   Assisted Physician in Procedure  Yes   Procedure Tolerated Well

## 2019-05-13 NOTE — PROGRESS NOTES
Patient presents to wound clinic for: Diamante Bronson is a 64 y.o. male who presents for initial visit with me. Pt had a hip replacement months ago and developed a wound infection. He had a wound vac placed several weeks ago and had good success but the wound closure stalled. He had some debridement done two weeks ago. He denies pain. According to the notes the wound continues to improve and is smaller than it was 2 weeks ago. Pertinent Medical History:  Past Medical History:   Diagnosis Date    Bursitis of right shoulder     Chronic pain     right shoulder and lower back.  Diabetes ([facility] [address] 2015    GERD (gastroesophageal reflux disease)     Hypercholesterolemia     Hypertension 2015    Morbid obesity ([facility] [address]     1- BMI 46.1    OA (osteoarthritis)      Past Surgical History:   Procedure Laterality Date    HX GI      EGD, Colonoscopy    HX HEENT Bilateral     lasik eye surgery    HX KNEE ARTHROSCOPY Left 12/2017    HX ORTHOPAEDIC Left     CTR    HX OTHER SURGICAL      wisdom teeth extraction ,bone graft    HX OTHER SURGICAL       Prior to Admission medications    Medication Sig Start Date End Date Taking? Authorizing Provider   aspirin 81 mg chewable tablet Take 1 Tab by mouth two (2) times a day. 2/14/19   Ivonne Alvarado PA-C   amLODIPine (NORVASC) 5 mg tablet Take 5 mg by mouth daily. Provider, Historical   dulaglutide (TRULICITY) 1.5 ZW/0.1 mL sub-q pen 1.5 mg by SubCUTAneous route Every Saturday. Provider, Historical   fexofenadine (ALLEGRA) 180 mg tablet Take 180 mg by mouth daily. Provider, Historical   metFORMIN (GLUCOPHAGE) 1,000 mg tablet Take 1,000 mg by mouth two (2) times daily (with meals). Provider, Historical   ergocalciferol (ERGOCALCIFEROL) 50,000 unit capsule Take 50,000 Units by mouth every Monday. Provider, Historical   irbesartan (AVAPRO) 150 mg tablet Take 150 mg by mouth two (2) times a day.     Provider, Historical   atorvastatin (LIPITOR) 20 mg tablet Take 20 mg by mouth nightly. Monday, Tuesday, Wednesday, Thursday, Friday     Provider, Historical   zolpidem (AMBIEN) 5 mg tablet Take 5 mg by mouth nightly as needed for Sleep. Provider, Historical   metaxalone (SKELAXIN) 800 mg tablet Take 800 mg by mouth every six (6) hours as needed for Pain. Provider, Historical   amLODIPine (NORVASC) 2.5 mg tablet Take 2.5 mg by mouth every evening. Provider, Historical   carvedilol (COREG) 12.5 mg tablet Take 12.5 mg by mouth two (2) times a day. Provider, Historical   esomeprazole (NEXIUM) 20 mg capsule Take 20 mg by mouth daily. Provider, Historical     Allergies   Allergen Reactions    Bydureon [Exenatide Microspheres] Other (comments)     Scar tissue under skin     No family history on file. Social History     Socioeconomic History    Marital status:      Spouse name: Not on file    Number of children: Not on file    Years of education: Not on file    Highest education level: Not on file   Occupational History    Not on file   Social Needs    Financial resource strain: Not on file    Food insecurity:     Worry: Not on file     Inability: Not on file    Transportation needs:     Medical: Not on file     Non-medical: Not on file   Tobacco Use    Smoking status: Current Some Day Smoker    Smokeless tobacco: Never Used    Tobacco comment: one cigar 3 x year   Substance and Sexual Activity    Alcohol use:  Yes     Alcohol/week: 3.6 oz     Types: 2 Glasses of wine, 2 Cans of beer, 2 Shots of liquor per week    Drug use: No    Sexual activity: Yes     Partners: Female   Lifestyle    Physical activity:     Days per week: Not on file     Minutes per session: Not on file    Stress: Not on file   Relationships    Social connections:     Talks on phone: Not on file     Gets together: Not on file     Attends Sikhism service: Not on file     Active member of club or organization: Not on file     Attends meetings of clubs or organizations: Not on file     Relationship status: Not on file    Intimate partner violence:     Fear of current or ex partner: Not on file     Emotionally abused: Not on file     Physically abused: Not on file     Forced sexual activity: Not on file   Other Topics Concern    Not on file   Social History Narrative    Not on file     Review of Systems:    Gen: No fever, chills, malaise, weight loss/gain. Heent: No headache, rhinorrhea, epistaxis, ear pain, hearing loss, sinus pain, neck pain/stiffness, sore throat. Heart: No chest pain, palpitations, CORNELIUS, pnd, or orthopnea. Resp: No cough, hemoptysis, wheezing and shortness of breath. GI: No nausea, vomiting, diarrhea, constipation, melena or hematochezia. : No urinary obstruction, dysuria or hematuria. Derm: see below  Musc/skeletal: no bone or joint complains. Vasc: No edema, cyanosis or claudication. Endo: No heat/cold intolerance, no polyuria,polydipsia or polyphagia. Neuro: No unilateral weakness, numbness, tingling. No seizures. Heme: No easy bruising or bleeding. EXAM:    Vitals:    05/13/19 0820   BP: 137/88   Pulse: 79   Resp: 19   Temp: 98 °F (36.7 °C)   SpO2: 97%     Gen: Obese middle aged male in no obvious distress. CV RRR  Lungs CTA. Abdomen rotund. Neuro: Grossly intact. Wound Description:       05/13/19 0928   Wound Hip Anterior; Left   Date First Assessed/Time First Assessed: 03/13/19 1402   POA: No  Wound Type: Open incision/surgical site  Location: Hip  Orientation: Anterior; Left   Dressing Status  Breakthrough drainage   Dressing Type  Absorptive   Non-Pressure Injury Full thickness (subcut/muscle)   Wound Length (cm) 1 cm   Wound Width (cm) 0.5 cm   Wound Depth (cm) 0.3   Wound Surface area (cm^2) 0.5 cm^2   Change in Wound Size % 97.92   Condition of Base Granulation   Condition of Edges Rolled/curled   Tissue Type Pink;Red   Direction of Tunnels 12  oclock   Depth of Tunnel/Sinus (cm) 1.5 cm   Drainage Amount  Small    Drainage Color Clear   Wound Odor Mild   Periwound Skin Condition Intact               Debridement:required today to promote wound healing. Ulcer Debridement    Ulcer Number 1; Left  Surgical Thigh To Fat Layer    Character of Ulcer: Improved    Indication for Debridement: Slough, Exudate and Abnormal wound edge    Risks of procedure were discussed with patient. Consent has been signed. Anesthetic: None     Level of Debridement: Subctaneous Tissue     Tissue and/or Material removed: [address] and Subcutaneous    Type of Tissue: Non- Viable      Pre-debridement severity: Fat Layer Exposed     Post debridement severity: Fat Layer exposed    Instrument(s) used: Curette    Bleeding controlled with: Pressure    Estimated blood loss: Minimal    Patient tolerated procedure well. Infection: Not currently. Assessment:  Patient Active Problem List   Diagnosis Code    High cholesterol E78.00    HTN (hypertension) I10    Tear of medial meniscus of left knee, current S83.242A    Acute medial meniscus tear of left knee S83.242A    Osteoarthritis of left hip M16.12    Left hip postoperative wound infection T81.49XA    Postoperative wound infection of left hip T81.49XA    Open wound of left hip S71.002A       Plan:  1. Serial debridement. 2. Local dressing. Cleansing and Cleansing Agents  Normal saline   Dressing Type Applied Absorptive;Collagens/cell matrix; Other (Comment)  (mepitel one,exudry,drape)   Procedure Tolerated Well     3. RTC in 1 week.

## 2019-05-16 ENCOUNTER — APPOINTMENT (OUTPATIENT)
Dept: WOUND CARE | Age: 59
End: 2019-05-16
Payer: COMMERCIAL

## 2019-05-20 ENCOUNTER — HOSPITAL ENCOUNTER (OUTPATIENT)
Dept: CT IMAGING | Age: 59
Discharge: HOME | DRG: 467 | End: 2019-05-20
Attending: FAMILY MEDICINE
Payer: COMMERCIAL

## 2019-05-20 ENCOUNTER — HOSPITAL ENCOUNTER (OUTPATIENT)
Dept: WOUND CARE | Age: 59
Discharge: HOME | End: 2019-05-20
Attending: FAMILY MEDICINE
Payer: COMMERCIAL

## 2019-05-20 ENCOUNTER — HOSPITAL ENCOUNTER (OUTPATIENT)
Dept: LAB | Age: 59
Discharge: HOME | DRG: 467 | End: 2019-05-20
Payer: COMMERCIAL

## 2019-05-20 VITALS
HEART RATE: 75 BPM | OXYGEN SATURATION: 97 % | DIASTOLIC BLOOD PRESSURE: 84 MMHG | SYSTOLIC BLOOD PRESSURE: 131 MMHG | TEMPERATURE: 98.2 F

## 2019-05-20 PROBLEM — L03.116 CELLULITIS OF LEFT THIGH: Status: ACTIVE | Noted: 2019-05-20

## 2019-05-20 LAB
ANION GAP SERPL CALC-SCNC: 6 MMOL/L (ref 3–18)
BASOPHILS # BLD: 0 K/UL (ref 0–0.1)
BASOPHILS NFR BLD: 0 % (ref 0–2)
BUN SERPL-MCNC: 19 MG/DL (ref 7–18)
BUN/CREAT SERPL: 24 (ref 12–20)
CALCIUM SERPL-MCNC: 9.2 MG/DL (ref 8.5–10.1)
CHLORIDE SERPL-SCNC: 107 MMOL/L (ref 100–108)
CO2 SERPL-SCNC: 28 MMOL/L (ref 21–32)
CREAT SERPL-MCNC: 0.8 MG/DL (ref 0.6–1.3)
DIFFERENTIAL METHOD BLD: ABNORMAL
EOSINOPHIL # BLD: 0.1 K/UL (ref 0–0.4)
EOSINOPHIL NFR BLD: 1 % (ref 0–5)
ERYTHROCYTE [DISTWIDTH] IN BLOOD BY AUTOMATED COUNT: 12.9 % (ref 11.6–14.5)
GLUCOSE SERPL-MCNC: 135 MG/DL (ref 74–99)
HCT VFR BLD AUTO: 35.3 % (ref 36–48)
HGB BLD-MCNC: 11.4 G/DL (ref 13–16)
LYMPHOCYTES # BLD: 2.1 K/UL (ref 0.9–3.6)
LYMPHOCYTES NFR BLD: 22 % (ref 21–52)
MCH RBC QN AUTO: 29.4 PG (ref 24–34)
MCHC RBC AUTO-ENTMCNC: 32.3 G/DL (ref 31–37)
MCV RBC AUTO: 91 FL (ref 74–97)
MONOCYTES # BLD: 0.8 K/UL (ref 0.05–1.2)
MONOCYTES NFR BLD: 8 % (ref 3–10)
NEUTS SEG # BLD: 6.7 K/UL (ref 1.8–8)
NEUTS SEG NFR BLD: 69 % (ref 40–73)
PLATELET # BLD AUTO: 458 K/UL (ref 135–420)
PMV BLD AUTO: 8.6 FL (ref 9.2–11.8)
POTASSIUM SERPL-SCNC: 4.7 MMOL/L (ref 3.5–5.5)
RBC # BLD AUTO: 3.88 M/UL (ref 4.7–5.5)
SODIUM SERPL-SCNC: 141 MMOL/L (ref 136–145)
WBC # BLD AUTO: 9.8 K/UL (ref 4.6–13.2)

## 2019-05-20 PROCEDURE — 73701 CT LOWER EXTREMITY W/DYE: CPT

## 2019-05-20 PROCEDURE — 80048 BASIC METABOLIC PNL TOTAL CA: CPT

## 2019-05-20 PROCEDURE — 74011636320 HC RX REV CODE- 636/320: Performed by: FAMILY MEDICINE

## 2019-05-20 PROCEDURE — 36415 COLL VENOUS BLD VENIPUNCTURE: CPT

## 2019-05-20 PROCEDURE — 99213 OFFICE O/P EST LOW 20 MIN: CPT

## 2019-05-20 PROCEDURE — 85025 COMPLETE CBC W/AUTO DIFF WBC: CPT

## 2019-05-20 RX ORDER — LEVOFLOXACIN 750 MG/1
750 TABLET ORAL DAILY
Qty: 10 TAB | Refills: 0 | Status: SHIPPED | OUTPATIENT
Start: 2019-05-20 | End: 2019-05-24

## 2019-05-20 RX ADMIN — IOPAMIDOL 100 ML: 612 INJECTION, SOLUTION INTRAVENOUS at 16:29

## 2019-05-20 NOTE — PROGRESS NOTES
Patient presents to wound clinic for: Joseline Self is a 64 y.o. male whom presents for follow up of his postoperative wound. Pt had debridement done last week. He was doing well until Thursday. He travelled and was developing some left hip discomfort. He noticed some swelling Friday and this has been progressively worse since then. Yesterday he noticed the area was getting hard and warm. This reminds him of his previous abscess development. Review of Systems:    Gen: No fever, chills, malaise, weight loss/gain. GI: No nausea, vomiting, diarrhea, constipation, melena or hematochezia. Derm: see below  Musc/skeletal: no bone or joint complains. Vasc: No edema, cyanosis or claudication. Exam:   Visit Vitals  /84   Pulse 75   Temp 98.2 °F (36.8 °C)   SpO2 97%     Gen: Obese middle aged male in mild pain. HEENT: Atraumatic, EOMI  CV RRR  Lungs CTA  Left anterior thigh - warm, indurated slightly warm with red streaks towards groin. The area of induration is about the same as a grapefruit. There is no drainage from his wound. Wound Description:      05/20/19 9099   Wound Hip Anterior; Left   Date First Assessed/Time First Assessed: 03/13/19 1402   POA: No  Wound Type: Open incision/surgical site  Location: Hip  Orientation: Anterior; Left   Dressing Status  Breakthrough drainage   Dressing Type  Absorptive   Non-Pressure Injury Full thickness (subcut/muscle)   Wound Length (cm) 0.3 cm   Wound Width (cm) 0.3 cm   Wound Depth (cm) 0.2   Wound Surface area (cm^2) 0.09 cm^2   Change in Wound Size % 99.62   Condition of Base Granulation   Condition of Edges Open   Tissue Type Pink;Red   Drainage Amount  Scant   Drainage Color Clear   Wound Odor None   Periwound Skin Condition Intact               Debridement: not required       Infection: Yes  Type: Concern for cellulitis vs abscess  Signs and Symptoms: warm, red, indurated soft tissue in the left anterior proximal thigh.   Radiological studies results: CT of hip pending. Antibiotic:   Oral: start    Name: Levaquin 750mg PO Daily for 10 days. Other Labs:   Ordered: CBC, BMP, CT Lower extremity to rule out abscess. Results: pending    Assessment:    Patient Active Problem List   Diagnosis Code    High cholesterol E78.00    HTN (hypertension) I10    Tear of medial meniscus of left knee, current S83.242A    Acute medial meniscus tear of left knee S83.242A    Osteoarthritis of left hip M16.12    Left hip postoperative wound infection T81.49XA    Postoperative wound infection of left hip T81.49XA    Open wound of left hip S71.002A    Cellulitis of left thigh L03. 116             Plan:   1. Patient has developed a new cellulitis over the wound. The wound has essentially closed but over the past three days he has developed an area of the thigh that is concerning for cellulitis vs abscess. CBC today showed a WBC of 9.0. He has had no fever. Will start him on oral Levaquin as it has the same bioavailability as IV Levaquin. CT of hip is pending. If the patient has an area of abscess he will need referral to Ortho ASAP. If negative for abscess will continue oral abx and follow up later this week. He has been given my number to call if he develops other symptoms in the meantime.       Briseida Lynn MD

## 2019-05-20 NOTE — WOUND CARE
05/20/19 0922   Wound Hip Anterior; Left   Date First Assessed/Time First Assessed: 03/13/19 1402   POA: No  Wound Type: Open incision/surgical site  Location: Hip  Orientation: Anterior; Left   Dressing Status  Breakthrough drainage   Dressing Type  Absorptive   Non-Pressure Injury Full thickness (subcut/muscle)   Wound Length (cm) 0.3 cm   Wound Width (cm) 0.3 cm   Wound Depth (cm) 0.2   Wound Surface area (cm^2) 0.09 cm^2   Change in Wound Size % 99.62   Condition of Base Granulation   Condition of Edges Open   Tissue Type Pink;Red   Drainage Amount  Scant   Drainage Color Clear   Wound Odor None   Periwound Skin Condition Intact   Cleansing and Cleansing Agents  Other (comment)  (Vashe wound cleanser)   Dressing Type Applied Absorptive   Procedure Tolerated Well        05/20/19 0922   Wound Hip Anterior; Left   Date First Assessed/Time First Assessed: 03/13/19 1402   POA: No  Wound Type: Open incision/surgical site  Location: Hip  Orientation: Anterior; Left   Dressing Status  Breakthrough drainage   Dressing Type  Absorptive   Non-Pressure Injury Full thickness (subcut/muscle)   Wound Length (cm) 0.3 cm   Wound Width (cm) 0.3 cm   Wound Depth (cm) 0.2   Wound Surface area (cm^2) 0.09 cm^2   Change in Wound Size % 99.62   Condition of Base Granulation   Condition of Edges Open   Tissue Type Pink;Red   Drainage Amount  Scant   Drainage Color Clear   Wound Odor None   Periwound Skin Condition Intact   Cleansing and Cleansing Agents  Other (comment)  (Vashe wound cleanser)   Dressing Type Applied Absorptive   Procedure Tolerated Well

## 2019-05-21 ENCOUNTER — HOSPITAL ENCOUNTER (OUTPATIENT)
Dept: INFUSION THERAPY | Age: 59
End: 2019-05-21

## 2019-05-21 RX ORDER — SODIUM CHLORIDE 0.9 % (FLUSH) 0.9 %
5-40 SYRINGE (ML) INJECTION EVERY 8 HOURS
Status: CANCELLED | OUTPATIENT
Start: 2019-05-21

## 2019-05-21 RX ORDER — SODIUM CHLORIDE 0.9 % (FLUSH) 0.9 %
5-40 SYRINGE (ML) INJECTION AS NEEDED
Status: CANCELLED | OUTPATIENT
Start: 2019-05-21

## 2019-05-21 RX ORDER — CEFAZOLIN SODIUM 2 G/50ML
2 SOLUTION INTRAVENOUS ONCE
Status: CANCELLED | OUTPATIENT
Start: 2019-05-21 | End: 2019-05-21

## 2019-05-21 NOTE — H&P
History and Physical        Patient: Chang Dixon               Sex: male          DOA: (Not on file)         YOB: 1960      Age:  64 y.o.        LOS:  LOS: 0 days        HPI:     Rabia Barnett is status post his left direct anterior KATHERINE  from 02/13/19 and status post left superior I&D on 03/13/19 / recent CT scan showing new fluid collection superficially with no effusion. He has had worsening discomfort in the last week and a half with no fevers or chills. WBC was normal.  He was seen in the office yesterday and is still having increased pain. Past Medical History:   Diagnosis Date    Bursitis of right shoulder     Chronic pain     right shoulder and lower back.  Diabetes ([facility] [address] 2015    GERD (gastroesophageal reflux disease)     Hypercholesterolemia     Hypertension 2015    Morbid obesity (HCC)     1- BMI 46.1    OA (osteoarthritis)        Past Surgical History:   Procedure Laterality Date    HX GI      EGD, Colonoscopy    HX HEENT Bilateral     lasik eye surgery    HX KNEE ARTHROSCOPY Left 12/2017    HX KNEE REPLACEMENT Left 02/13/2019    HX ORTHOPAEDIC Left     CTR    HX ORTHOPAEDIC Left 03/13/2019    hip superficial I&D    HX OTHER SURGICAL      wisdom teeth extraction ,bone graft    HX OTHER SURGICAL         No family history on file. Social History     Socioeconomic History    Marital status:      Spouse name: Not on file    Number of children: Not on file    Years of education: Not on file    Highest education level: Not on file   Tobacco Use    Smoking status: Current Some Day Smoker    Smokeless tobacco: Never Used    Tobacco comment: one cigar 3 x year   Substance and Sexual Activity    Alcohol use:  Yes     Alcohol/week: 3.6 oz     Types: 2 Glasses of wine, 2 Cans of beer, 2 Shots of liquor per week    Drug use: No    Sexual activity: Yes     Partners: Female       Prior to Admission medications    Medication Sig Start Date End Date Taking? Authorizing Provider   gabapentin (NEURONTIN) 400 mg capsule Take 400 mg by mouth three (3) times daily. Indications: for the hip    Provider, Historical   HYDROmorphone (DILAUDID) 4 mg tablet Take 4 mg by mouth every six (6) hours as needed for Pain. Indications: Severe Pain    Provider, Historical   levoFLOXacin (LEVAQUIN) 750 mg tablet Take 1 Tab by mouth daily for 10 days. 5/20/19 5/30/19  Tani Huang MD   amLODIPine (NORVASC) 5 mg tablet Take 5 mg by mouth daily. Provider, Historical   dulaglutide (TRULICITY) 1.5 JAMIE/5.3 mL sub-q pen 1.5 mg by SubCUTAneous route Every Saturday. Provider, Historical   fexofenadine (ALLEGRA) 180 mg tablet Take 180 mg by mouth daily. Provider, Historical   metFORMIN (GLUCOPHAGE) 1,000 mg tablet Take 1,000 mg by mouth two (2) times daily (with meals). Provider, Historical   ergocalciferol (ERGOCALCIFEROL) 50,000 unit capsule Take 50,000 Units by mouth every Monday. Provider, Historical   irbesartan (AVAPRO) 150 mg tablet Take 150 mg by mouth two (2) times a day. Provider, Historical   atorvastatin (LIPITOR) 20 mg tablet Take 20 mg by mouth nightly. Monday, Tuesday, Wednesday, Thursday, Friday     Provider, Historical   metaxalone (SKELAXIN) 800 mg tablet Take 800 mg by mouth every six (6) hours as needed for Pain. Provider, Historical   amLODIPine (NORVASC) 2.5 mg tablet Take 2.5 mg by mouth every evening. Provider, Historical   carvedilol (COREG) 12.5 mg tablet Take 12.5 mg by mouth two (2) times a day. Provider, Historical   esomeprazole (NEXIUM) 20 mg capsule Take 20 mg by mouth daily. Provider, Historical       Allergies   Allergen Reactions    Bydureon [Exenatide Microspheres] Other (comments)     Scar tissue under skin       Review of Systems  A comprehensive review of systems was negative except for that written in the History of Present Illness. Physical Exam:      There were no vitals taken for this visit.     Physical Exam:  On exam, the patient's incision is still well healed, there is no discharge. The incision skin is tight and firm. He does have tenderness to palpation but not with ROM of the hip. The wound is contracted. Assessment/Plan     Active Problems:    High cholesterol (12/18/2017)      HTN (hypertension) (12/18/2017)      Left hip postoperative wound infection (3/12/2019)      Dr. Nixon Gifford scheduled him for a superficial I&D at the end of his day tomorrow at the operating room. At that time we will examine him for possible deep infection. We will schedule this as an outpatient procedure with Keflex and Dr. Nixon Gifford has given him Dilaudid for post operative pain. If it goes into the joint it will require admission to the hospital with a PICC line etc.  Dr. Nixon Gifford talked to him about all of this. Dr. Nixon Gifford will see him tomorrow.

## 2019-05-22 ENCOUNTER — HOSPITAL ENCOUNTER (INPATIENT)
Age: 59
LOS: 2 days | Discharge: HOME HEALTH | DRG: 467 | End: 2019-05-24
Attending: ORTHOPAEDIC SURGERY | Admitting: ORTHOPAEDIC SURGERY
Payer: COMMERCIAL

## 2019-05-22 ENCOUNTER — ANESTHESIA EVENT (OUTPATIENT)
Dept: SURGERY | Age: 59
DRG: 467 | End: 2019-05-22
Payer: COMMERCIAL

## 2019-05-22 ENCOUNTER — ANESTHESIA (OUTPATIENT)
Dept: SURGERY | Age: 59
DRG: 467 | End: 2019-05-22
Payer: COMMERCIAL

## 2019-05-22 DIAGNOSIS — T84.52XA INFECTION ASSOCIATED WITH INTERNAL LEFT HIP PROSTHESIS, INITIAL ENCOUNTER (HCC): Primary | Chronic | ICD-10-CM

## 2019-05-22 PROBLEM — Z96.649 PERIPROSTHETIC FRACTURE AROUND INTERNAL PROSTHETIC HIP JOINT: Status: ACTIVE | Noted: 2019-05-22

## 2019-05-22 PROBLEM — M97.8XXA PERIPROSTHETIC FRACTURE AROUND INTERNAL PROSTHETIC HIP JOINT: Status: ACTIVE | Noted: 2019-05-22

## 2019-05-22 LAB
ANION GAP SERPL CALC-SCNC: 8 MMOL/L (ref 3–18)
APPEARANCE UR: CLEAR
ATRIAL RATE: 82 BPM
BACTERIA SPEC CULT: NORMAL
BILIRUB UR QL: NEGATIVE
BUN SERPL-MCNC: 15 MG/DL (ref 7–18)
BUN/CREAT SERPL: 15 (ref 12–20)
CALCIUM SERPL-MCNC: 8.5 MG/DL (ref 8.5–10.1)
CALCULATED P AXIS, ECG09: 44 DEGREES
CALCULATED R AXIS, ECG10: 41 DEGREES
CALCULATED T AXIS, ECG11: 42 DEGREES
CHLORIDE SERPL-SCNC: 105 MMOL/L (ref 100–108)
CO2 SERPL-SCNC: 28 MMOL/L (ref 21–32)
COLOR UR: YELLOW
CREAT SERPL-MCNC: 1.03 MG/DL (ref 0.6–1.3)
DIAGNOSIS, 93000: NORMAL
ERYTHROCYTE [SEDIMENTATION RATE] IN BLOOD: 98 MM/HR (ref 0–20)
GLUCOSE BLD STRIP.AUTO-MCNC: 122 MG/DL (ref 70–110)
GLUCOSE BLD STRIP.AUTO-MCNC: 126 MG/DL (ref 70–110)
GLUCOSE BLD STRIP.AUTO-MCNC: 191 MG/DL (ref 70–110)
GLUCOSE SERPL-MCNC: 139 MG/DL (ref 74–99)
GLUCOSE UR STRIP.AUTO-MCNC: NEGATIVE MG/DL
HCT VFR BLD AUTO: 30.2 % (ref 36–48)
HGB BLD-MCNC: 9.9 G/DL (ref 13–16)
HGB UR QL STRIP: NEGATIVE
KETONES UR QL STRIP.AUTO: ABNORMAL MG/DL
LEUKOCYTE ESTERASE UR QL STRIP.AUTO: NEGATIVE
NITRITE UR QL STRIP.AUTO: NEGATIVE
P-R INTERVAL, ECG05: 142 MS
PH UR STRIP: 6.5 [PH] (ref 5–8)
POTASSIUM SERPL-SCNC: 4.7 MMOL/L (ref 3.5–5.5)
PROT UR STRIP-MCNC: NEGATIVE MG/DL
Q-T INTERVAL, ECG07: 400 MS
QRS DURATION, ECG06: 90 MS
QTC CALCULATION (BEZET), ECG08: 467 MS
SERVICE CMNT-IMP: NORMAL
SODIUM SERPL-SCNC: 141 MMOL/L (ref 136–145)
SP GR UR REFRACTOMETRY: 1.02 (ref 1–1.03)
UROBILINOGEN UR QL STRIP.AUTO: 0.2 EU/DL (ref 0.2–1)
VENTRICULAR RATE, ECG03: 82 BPM

## 2019-05-22 PROCEDURE — 77030018673: Performed by: ORTHOPAEDIC SURGERY

## 2019-05-22 PROCEDURE — 85018 HEMOGLOBIN: CPT

## 2019-05-22 PROCEDURE — 74011000250 HC RX REV CODE- 250

## 2019-05-22 PROCEDURE — 87075 CULTR BACTERIA EXCEPT BLOOD: CPT

## 2019-05-22 PROCEDURE — 0SUE09Z SUPPLEMENT LEFT HIP JOINT, ACETABULAR SURFACE WITH LINER, OPEN APPROACH: ICD-10-PCS | Performed by: ORTHOPAEDIC SURGERY

## 2019-05-22 PROCEDURE — 86140 C-REACTIVE PROTEIN: CPT

## 2019-05-22 PROCEDURE — 87070 CULTURE OTHR SPECIMN AEROBIC: CPT

## 2019-05-22 PROCEDURE — 74011250636 HC RX REV CODE- 250/636

## 2019-05-22 PROCEDURE — 86900 BLOOD TYPING SEROLOGIC ABO: CPT

## 2019-05-22 PROCEDURE — 02HV33Z INSERTION OF INFUSION DEVICE INTO SUPERIOR VENA CAVA, PERCUTANEOUS APPROACH: ICD-10-PCS | Performed by: ORTHOPAEDIC SURGERY

## 2019-05-22 PROCEDURE — 76060000035 HC ANESTHESIA 2 TO 2.5 HR: Performed by: ORTHOPAEDIC SURGERY

## 2019-05-22 PROCEDURE — 77030031139 HC SUT VCRL2 J&J -A: Performed by: ORTHOPAEDIC SURGERY

## 2019-05-22 PROCEDURE — 74011250637 HC RX REV CODE- 250/637: Performed by: PHYSICIAN ASSISTANT

## 2019-05-22 PROCEDURE — 93005 ELECTROCARDIOGRAM TRACING: CPT

## 2019-05-22 PROCEDURE — 3E0U029 INTRODUCTION OF OTHER ANTI-INFECTIVE INTO JOINTS, OPEN APPROACH: ICD-10-PCS | Performed by: ORTHOPAEDIC SURGERY

## 2019-05-22 PROCEDURE — 80048 BASIC METABOLIC PNL TOTAL CA: CPT

## 2019-05-22 PROCEDURE — 82962 GLUCOSE BLOOD TEST: CPT

## 2019-05-22 PROCEDURE — 74011250636 HC RX REV CODE- 250/636: Performed by: SPECIALIST

## 2019-05-22 PROCEDURE — 86923 COMPATIBILITY TEST ELECTRIC: CPT

## 2019-05-22 PROCEDURE — 0SPS0JZ REMOVAL OF SYNTHETIC SUBSTITUTE FROM LEFT HIP JOINT, FEMORAL SURFACE, OPEN APPROACH: ICD-10-PCS | Performed by: ORTHOPAEDIC SURGERY

## 2019-05-22 PROCEDURE — 87086 URINE CULTURE/COLONY COUNT: CPT

## 2019-05-22 PROCEDURE — 87077 CULTURE AEROBIC IDENTIFY: CPT

## 2019-05-22 PROCEDURE — 74011636637 HC RX REV CODE- 636/637: Performed by: PHYSICIAN ASSISTANT

## 2019-05-22 PROCEDURE — 77030032490 HC SLV COMPR SCD KNE COVD -B: Performed by: ORTHOPAEDIC SURGERY

## 2019-05-22 PROCEDURE — 77030013708 HC HNDPC SUC IRR PULS STRY –B: Performed by: ORTHOPAEDIC SURGERY

## 2019-05-22 PROCEDURE — 77030034694 HC SCPL CANADY PLSM DISP USMD -E: Performed by: ORTHOPAEDIC SURGERY

## 2019-05-22 PROCEDURE — 74011250636 HC RX REV CODE- 250/636: Performed by: PHYSICIAN ASSISTANT

## 2019-05-22 PROCEDURE — 76010000131 HC OR TIME 2 TO 2.5 HR: Performed by: ORTHOPAEDIC SURGERY

## 2019-05-22 PROCEDURE — 87641 MR-STAPH DNA AMP PROBE: CPT

## 2019-05-22 PROCEDURE — 74011250636 HC RX REV CODE- 250/636: Performed by: ORTHOPAEDIC SURGERY

## 2019-05-22 PROCEDURE — 85652 RBC SED RATE AUTOMATED: CPT

## 2019-05-22 PROCEDURE — 77030002916 HC SUT ETHLN J&J -A: Performed by: ORTHOPAEDIC SURGERY

## 2019-05-22 PROCEDURE — 77030018836 HC SOL IRR NACL ICUM -A: Performed by: ORTHOPAEDIC SURGERY

## 2019-05-22 PROCEDURE — 77030018835 HC SOL IRR LR ICUM -A: Performed by: ORTHOPAEDIC SURGERY

## 2019-05-22 PROCEDURE — C1776 JOINT DEVICE (IMPLANTABLE): HCPCS | Performed by: ORTHOPAEDIC SURGERY

## 2019-05-22 PROCEDURE — 77030012508 HC MSK AIRWY LMA AMBU -A: Performed by: ANESTHESIOLOGY

## 2019-05-22 PROCEDURE — 36415 COLL VENOUS BLD VENIPUNCTURE: CPT

## 2019-05-22 PROCEDURE — 65270000029 HC RM PRIVATE

## 2019-05-22 PROCEDURE — 77030038010: Performed by: ORTHOPAEDIC SURGERY

## 2019-05-22 PROCEDURE — 76210000016 HC OR PH I REC 1 TO 1.5 HR: Performed by: ORTHOPAEDIC SURGERY

## 2019-05-22 PROCEDURE — 74011000250 HC RX REV CODE- 250: Performed by: ORTHOPAEDIC SURGERY

## 2019-05-22 PROCEDURE — C1713 ANCHOR/SCREW BN/BN,TIS/BN: HCPCS | Performed by: ORTHOPAEDIC SURGERY

## 2019-05-22 PROCEDURE — 87186 SC STD MICRODIL/AGAR DIL: CPT

## 2019-05-22 PROCEDURE — 0SRS01A REPLACEMENT OF LEFT HIP JOINT, FEMORAL SURFACE WITH METAL SYNTHETIC SUBSTITUTE, UNCEMENTED, OPEN APPROACH: ICD-10-PCS | Performed by: ORTHOPAEDIC SURGERY

## 2019-05-22 PROCEDURE — 81003 URINALYSIS AUTO W/O SCOPE: CPT

## 2019-05-22 PROCEDURE — 0SPB09Z REMOVAL OF LINER FROM LEFT HIP JOINT, OPEN APPROACH: ICD-10-PCS | Performed by: ORTHOPAEDIC SURGERY

## 2019-05-22 DEVICE — STIMULAN® RAPID CURE PROVIDED STERILE FOR SINGLE PATIENT USE. STIMULAN® RAPID CURE CONTAINS CALCIUM SULFATE POWDER AND MIXING SOLUTION IN PRE-MEASURED QUANTITIES SO THAT WHEN MIXED TOGETHER IN A STERILE MIXING BOWL, THE RESULTANT PASTE IS TO BE DIGITALLY PACKED INTO OPEN BONE VOID/GAP TO SET INSITU OR PLACED INTO THE MOULD PROVIDED, THE MIXTURE SETS TO FORM BEADS. THE BIODEGRADABLE, RADIOPAQUE BEADS ARE RESORBED IN APPROXIMATELY 30 – 60 DAYS WHEN USED IN ACCORDANCE WITH THE DEVICE LABELLING. STIMULAN® RAPID CURE IS MANUFACTURED FROM SYNTHETIC IMPLANT GRADE CALCIUM SULFATE DIHYDRATE(CASO4.2H2O) THAT RESORBS AND IS REPLACED WITH BONE DURING THE HEALING PROCESS. ALSO, AS THE BONE VOID FILLER BEADS ARE BIODEGRADABLE AND BIOCOMPATIBLE, THEY MAY BE USED AT AN INFECTED SITE.
Type: IMPLANTABLE DEVICE | Site: HIP | Status: FUNCTIONAL
Brand: STIMULAN® RAPID CURE

## 2019-05-22 DEVICE — BIOLOX DELTA TS CERAMIC FEMORAL HEAD 12/14 TAPER REVISION DIAMETER 36MM +1.5
Type: IMPLANTABLE DEVICE | Site: HIP | Status: FUNCTIONAL
Brand: BIOLOX DELTA

## 2019-05-22 DEVICE — LINER ACET OD52MM ID36MM HIP ALTRX PINN: Type: IMPLANTABLE DEVICE | Site: HIP | Status: FUNCTIONAL

## 2019-05-22 RX ORDER — SODIUM CHLORIDE 0.9 % (FLUSH) 0.9 %
5-40 SYRINGE (ML) INJECTION EVERY 8 HOURS
Status: DISCONTINUED | OUTPATIENT
Start: 2019-05-22 | End: 2019-05-24 | Stop reason: HOSPADM

## 2019-05-22 RX ORDER — ZOLPIDEM TARTRATE 5 MG/1
5 TABLET ORAL
Status: DISCONTINUED | OUTPATIENT
Start: 2019-05-22 | End: 2019-05-24 | Stop reason: HOSPADM

## 2019-05-22 RX ORDER — DEXTROSE 50 % IN WATER (D50W) INTRAVENOUS SYRINGE
25-50 AS NEEDED
Status: DISCONTINUED | OUTPATIENT
Start: 2019-05-22 | End: 2019-05-24 | Stop reason: HOSPADM

## 2019-05-22 RX ORDER — SODIUM CHLORIDE 0.9 % (FLUSH) 0.9 %
5-40 SYRINGE (ML) INJECTION AS NEEDED
Status: DISCONTINUED | OUTPATIENT
Start: 2019-05-22 | End: 2019-05-24 | Stop reason: HOSPADM

## 2019-05-22 RX ORDER — ONDANSETRON 2 MG/ML
4 INJECTION INTRAMUSCULAR; INTRAVENOUS ONCE
Status: DISCONTINUED | OUTPATIENT
Start: 2019-05-22 | End: 2019-05-22 | Stop reason: HOSPADM

## 2019-05-22 RX ORDER — DIPHENHYDRAMINE HCL 25 MG
25 CAPSULE ORAL
Status: DISCONTINUED | OUTPATIENT
Start: 2019-05-22 | End: 2019-05-24 | Stop reason: HOSPADM

## 2019-05-22 RX ORDER — AMLODIPINE BESYLATE 2.5 MG/1
2.5 TABLET ORAL EVERY EVENING
Status: DISCONTINUED | OUTPATIENT
Start: 2019-05-22 | End: 2019-05-24 | Stop reason: HOSPADM

## 2019-05-22 RX ORDER — ACETAMINOPHEN 325 MG/1
650 TABLET ORAL EVERY 6 HOURS
Status: DISCONTINUED | OUTPATIENT
Start: 2019-05-22 | End: 2019-05-24 | Stop reason: HOSPADM

## 2019-05-22 RX ORDER — INSULIN LISPRO 100 [IU]/ML
INJECTION, SOLUTION INTRAVENOUS; SUBCUTANEOUS ONCE
Status: DISCONTINUED | OUTPATIENT
Start: 2019-05-22 | End: 2019-05-22 | Stop reason: HOSPADM

## 2019-05-22 RX ORDER — MIDAZOLAM HYDROCHLORIDE 1 MG/ML
INJECTION, SOLUTION INTRAMUSCULAR; INTRAVENOUS AS NEEDED
Status: DISCONTINUED | OUTPATIENT
Start: 2019-05-22 | End: 2019-05-22 | Stop reason: HOSPADM

## 2019-05-22 RX ORDER — IRBESARTAN 150 MG/1
300 TABLET ORAL DAILY
Status: DISCONTINUED | OUTPATIENT
Start: 2019-05-23 | End: 2019-05-24 | Stop reason: HOSPADM

## 2019-05-22 RX ORDER — SODIUM CHLORIDE, SODIUM LACTATE, POTASSIUM CHLORIDE, CALCIUM CHLORIDE 600; 310; 30; 20 MG/100ML; MG/100ML; MG/100ML; MG/100ML
75 INJECTION, SOLUTION INTRAVENOUS CONTINUOUS
Status: DISPENSED | OUTPATIENT
Start: 2019-05-22 | End: 2019-05-23

## 2019-05-22 RX ORDER — FENTANYL CITRATE 50 UG/ML
INJECTION, SOLUTION INTRAMUSCULAR; INTRAVENOUS AS NEEDED
Status: DISCONTINUED | OUTPATIENT
Start: 2019-05-22 | End: 2019-05-22 | Stop reason: HOSPADM

## 2019-05-22 RX ORDER — HYDROMORPHONE HYDROCHLORIDE 2 MG/1
4-8 TABLET ORAL
Status: DISCONTINUED | OUTPATIENT
Start: 2019-05-22 | End: 2019-05-24 | Stop reason: HOSPADM

## 2019-05-22 RX ORDER — LANOLIN ALCOHOL/MO/W.PET/CERES
1 CREAM (GRAM) TOPICAL 3 TIMES DAILY
Status: DISCONTINUED | OUTPATIENT
Start: 2019-05-22 | End: 2019-05-24 | Stop reason: HOSPADM

## 2019-05-22 RX ORDER — SODIUM CHLORIDE 9 MG/ML
250 INJECTION, SOLUTION INTRAVENOUS AS NEEDED
Status: DISCONTINUED | OUTPATIENT
Start: 2019-05-22 | End: 2019-05-24 | Stop reason: HOSPADM

## 2019-05-22 RX ORDER — PROPOFOL 10 MG/ML
INJECTION, EMULSION INTRAVENOUS AS NEEDED
Status: DISCONTINUED | OUTPATIENT
Start: 2019-05-22 | End: 2019-05-22 | Stop reason: HOSPADM

## 2019-05-22 RX ORDER — NALOXONE HYDROCHLORIDE 0.4 MG/ML
0.4 INJECTION, SOLUTION INTRAMUSCULAR; INTRAVENOUS; SUBCUTANEOUS AS NEEDED
Status: DISCONTINUED | OUTPATIENT
Start: 2019-05-22 | End: 2019-05-24 | Stop reason: HOSPADM

## 2019-05-22 RX ORDER — ASPIRIN 81 MG/1
81 TABLET ORAL 2 TIMES DAILY
Status: DISCONTINUED | OUTPATIENT
Start: 2019-05-22 | End: 2019-05-24 | Stop reason: HOSPADM

## 2019-05-22 RX ORDER — VANCOMYCIN HYDROCHLORIDE 1 G/20ML
INJECTION, POWDER, LYOPHILIZED, FOR SOLUTION INTRAVENOUS AS NEEDED
Status: DISCONTINUED | OUTPATIENT
Start: 2019-05-22 | End: 2019-05-22 | Stop reason: HOSPADM

## 2019-05-22 RX ORDER — METAXALONE 800 MG/1
800 TABLET ORAL
Status: DISCONTINUED | OUTPATIENT
Start: 2019-05-22 | End: 2019-05-24 | Stop reason: HOSPADM

## 2019-05-22 RX ORDER — VANCOMYCIN 2 GRAM/500 ML IN 0.9 % SODIUM CHLORIDE INTRAVENOUS
2000 EVERY 12 HOURS
Status: DISCONTINUED | OUTPATIENT
Start: 2019-05-23 | End: 2019-05-24

## 2019-05-22 RX ORDER — SODIUM CHLORIDE, SODIUM LACTATE, POTASSIUM CHLORIDE, CALCIUM CHLORIDE 600; 310; 30; 20 MG/100ML; MG/100ML; MG/100ML; MG/100ML
125 INJECTION, SOLUTION INTRAVENOUS CONTINUOUS
Status: DISCONTINUED | OUTPATIENT
Start: 2019-05-22 | End: 2019-05-22 | Stop reason: HOSPADM

## 2019-05-22 RX ORDER — LIDOCAINE HYDROCHLORIDE 20 MG/ML
INJECTION, SOLUTION EPIDURAL; INFILTRATION; INTRACAUDAL; PERINEURAL AS NEEDED
Status: DISCONTINUED | OUTPATIENT
Start: 2019-05-22 | End: 2019-05-22 | Stop reason: HOSPADM

## 2019-05-22 RX ORDER — GABAPENTIN 400 MG/1
400 CAPSULE ORAL 3 TIMES DAILY
Status: DISCONTINUED | OUTPATIENT
Start: 2019-05-22 | End: 2019-05-24 | Stop reason: HOSPADM

## 2019-05-22 RX ORDER — LORATADINE 10 MG/1
10 TABLET ORAL DAILY
Status: DISCONTINUED | OUTPATIENT
Start: 2019-05-23 | End: 2019-05-24 | Stop reason: HOSPADM

## 2019-05-22 RX ORDER — HYDROMORPHONE HYDROCHLORIDE 2 MG/ML
0.5 INJECTION, SOLUTION INTRAMUSCULAR; INTRAVENOUS; SUBCUTANEOUS
Status: DISCONTINUED | OUTPATIENT
Start: 2019-05-22 | End: 2019-05-22 | Stop reason: HOSPADM

## 2019-05-22 RX ORDER — FENTANYL CITRATE 50 UG/ML
25 INJECTION, SOLUTION INTRAMUSCULAR; INTRAVENOUS
Status: DISCONTINUED | OUTPATIENT
Start: 2019-05-22 | End: 2019-05-22 | Stop reason: HOSPADM

## 2019-05-22 RX ORDER — HYDROMORPHONE HYDROCHLORIDE 2 MG/ML
0.5 INJECTION, SOLUTION INTRAMUSCULAR; INTRAVENOUS; SUBCUTANEOUS
Status: DISCONTINUED | OUTPATIENT
Start: 2019-05-22 | End: 2019-05-22 | Stop reason: RX

## 2019-05-22 RX ORDER — MAGNESIUM SULFATE 100 %
4 CRYSTALS MISCELLANEOUS AS NEEDED
Status: DISCONTINUED | OUTPATIENT
Start: 2019-05-22 | End: 2019-05-22 | Stop reason: HOSPADM

## 2019-05-22 RX ORDER — CARVEDILOL 12.5 MG/1
12.5 TABLET ORAL 2 TIMES DAILY WITH MEALS
Status: DISCONTINUED | OUTPATIENT
Start: 2019-05-22 | End: 2019-05-24 | Stop reason: HOSPADM

## 2019-05-22 RX ORDER — PHENYLEPHRINE HYDROCHLORIDE 10 MG/ML
INJECTION INTRAVENOUS
Status: DISCONTINUED | OUTPATIENT
Start: 2019-05-22 | End: 2019-05-22 | Stop reason: HOSPADM

## 2019-05-22 RX ORDER — KETAMINE HYDROCHLORIDE 10 MG/ML
INJECTION, SOLUTION INTRAMUSCULAR; INTRAVENOUS AS NEEDED
Status: DISCONTINUED | OUTPATIENT
Start: 2019-05-22 | End: 2019-05-22 | Stop reason: HOSPADM

## 2019-05-22 RX ORDER — GLYCOPYRROLATE 0.2 MG/ML
INJECTION INTRAMUSCULAR; INTRAVENOUS AS NEEDED
Status: DISCONTINUED | OUTPATIENT
Start: 2019-05-22 | End: 2019-05-22 | Stop reason: HOSPADM

## 2019-05-22 RX ORDER — OXYCODONE AND ACETAMINOPHEN 5; 325 MG/1; MG/1
1 TABLET ORAL AS NEEDED
Status: DISCONTINUED | OUTPATIENT
Start: 2019-05-22 | End: 2019-05-22 | Stop reason: HOSPADM

## 2019-05-22 RX ORDER — SODIUM CHLORIDE, SODIUM LACTATE, POTASSIUM CHLORIDE, CALCIUM CHLORIDE 600; 310; 30; 20 MG/100ML; MG/100ML; MG/100ML; MG/100ML
50 INJECTION, SOLUTION INTRAVENOUS CONTINUOUS
Status: DISCONTINUED | OUTPATIENT
Start: 2019-05-22 | End: 2019-05-22 | Stop reason: HOSPADM

## 2019-05-22 RX ORDER — MAGNESIUM SULFATE 100 %
4 CRYSTALS MISCELLANEOUS AS NEEDED
Status: DISCONTINUED | OUTPATIENT
Start: 2019-05-22 | End: 2019-05-24 | Stop reason: HOSPADM

## 2019-05-22 RX ORDER — PANTOPRAZOLE SODIUM 40 MG/1
40 TABLET, DELAYED RELEASE ORAL DAILY
Status: DISCONTINUED | OUTPATIENT
Start: 2019-05-23 | End: 2019-05-24 | Stop reason: HOSPADM

## 2019-05-22 RX ORDER — NALOXONE HYDROCHLORIDE 0.4 MG/ML
0.1 INJECTION, SOLUTION INTRAMUSCULAR; INTRAVENOUS; SUBCUTANEOUS
Status: DISCONTINUED | OUTPATIENT
Start: 2019-05-22 | End: 2019-05-22 | Stop reason: HOSPADM

## 2019-05-22 RX ORDER — AMLODIPINE BESYLATE 5 MG/1
5 TABLET ORAL DAILY
Status: DISCONTINUED | OUTPATIENT
Start: 2019-05-23 | End: 2019-05-24 | Stop reason: HOSPADM

## 2019-05-22 RX ORDER — DEXTROSE 50 % IN WATER (D50W) INTRAVENOUS SYRINGE
25-50 AS NEEDED
Status: DISCONTINUED | OUTPATIENT
Start: 2019-05-22 | End: 2019-05-22 | Stop reason: HOSPADM

## 2019-05-22 RX ORDER — HYDROMORPHONE HYDROCHLORIDE 2 MG/ML
INJECTION, SOLUTION INTRAMUSCULAR; INTRAVENOUS; SUBCUTANEOUS AS NEEDED
Status: DISCONTINUED | OUTPATIENT
Start: 2019-05-22 | End: 2019-05-22 | Stop reason: HOSPADM

## 2019-05-22 RX ORDER — FACIAL-BODY WIPES
10 EACH TOPICAL DAILY PRN
Status: DISCONTINUED | OUTPATIENT
Start: 2019-05-22 | End: 2019-05-24 | Stop reason: HOSPADM

## 2019-05-22 RX ORDER — ONDANSETRON 2 MG/ML
INJECTION INTRAMUSCULAR; INTRAVENOUS AS NEEDED
Status: DISCONTINUED | OUTPATIENT
Start: 2019-05-22 | End: 2019-05-22 | Stop reason: HOSPADM

## 2019-05-22 RX ORDER — FENTANYL CITRATE 50 UG/ML
50 INJECTION, SOLUTION INTRAMUSCULAR; INTRAVENOUS SEE ADMIN INSTRUCTIONS
Status: DISPENSED | OUTPATIENT
Start: 2019-05-22 | End: 2019-05-24

## 2019-05-22 RX ORDER — SODIUM CHLORIDE, SODIUM LACTATE, POTASSIUM CHLORIDE, CALCIUM CHLORIDE 600; 310; 30; 20 MG/100ML; MG/100ML; MG/100ML; MG/100ML
125 INJECTION, SOLUTION INTRAVENOUS CONTINUOUS
Status: DISPENSED | OUTPATIENT
Start: 2019-05-22 | End: 2019-05-23

## 2019-05-22 RX ORDER — RIFAMPIN 600 MG/10ML
INJECTION, POWDER, LYOPHILIZED, FOR SOLUTION INTRAVENOUS AS NEEDED
Status: DISCONTINUED | OUTPATIENT
Start: 2019-05-22 | End: 2019-05-22 | Stop reason: HOSPADM

## 2019-05-22 RX ORDER — INSULIN LISPRO 100 [IU]/ML
INJECTION, SOLUTION INTRAVENOUS; SUBCUTANEOUS
Status: DISCONTINUED | OUTPATIENT
Start: 2019-05-22 | End: 2019-05-24 | Stop reason: HOSPADM

## 2019-05-22 RX ORDER — ATORVASTATIN CALCIUM 20 MG/1
20 TABLET, FILM COATED ORAL
Status: DISCONTINUED | OUTPATIENT
Start: 2019-05-22 | End: 2019-05-24 | Stop reason: HOSPADM

## 2019-05-22 RX ADMIN — HYDROMORPHONE HYDROCHLORIDE 0.5 MG: 2 INJECTION INTRAMUSCULAR; INTRAVENOUS; SUBCUTANEOUS at 17:53

## 2019-05-22 RX ADMIN — SODIUM CHLORIDE, SODIUM LACTATE, POTASSIUM CHLORIDE, AND CALCIUM CHLORIDE: 600; 310; 30; 20 INJECTION, SOLUTION INTRAVENOUS at 16:00

## 2019-05-22 RX ADMIN — PROPOFOL 200 MG: 10 INJECTION, EMULSION INTRAVENOUS at 15:29

## 2019-05-22 RX ADMIN — ACETAMINOPHEN 650 MG: 325 TABLET ORAL at 21:30

## 2019-05-22 RX ADMIN — ONDANSETRON 4 MG: 2 INJECTION INTRAMUSCULAR; INTRAVENOUS at 15:37

## 2019-05-22 RX ADMIN — SODIUM CHLORIDE, SODIUM LACTATE, POTASSIUM CHLORIDE, AND CALCIUM CHLORIDE 125 ML/HR: 600; 310; 30; 20 INJECTION, SOLUTION INTRAVENOUS at 12:55

## 2019-05-22 RX ADMIN — CEFAZOLIN SODIUM 3 G: 10 INJECTION, POWDER, FOR SOLUTION INTRAVENOUS at 22:37

## 2019-05-22 RX ADMIN — FENTANYL CITRATE 50 MCG: 50 INJECTION, SOLUTION INTRAMUSCULAR; INTRAVENOUS at 14:01

## 2019-05-22 RX ADMIN — CEFAZOLIN SODIUM 3 G: 10 INJECTION, POWDER, FOR SOLUTION INTRAVENOUS at 15:54

## 2019-05-22 RX ADMIN — HYDROMORPHONE HYDROCHLORIDE 0.5 MG: 2 INJECTION, SOLUTION INTRAMUSCULAR; INTRAVENOUS; SUBCUTANEOUS at 17:25

## 2019-05-22 RX ADMIN — PHENYLEPHRINE HYDROCHLORIDE 50 MCG/KG/MIN: 10 INJECTION INTRAVENOUS at 16:44

## 2019-05-22 RX ADMIN — FERROUS SULFATE TAB 325 MG (65 MG ELEMENTAL FE) 325 MG: 325 (65 FE) TAB at 21:30

## 2019-05-22 RX ADMIN — HYDROMORPHONE HYDROCHLORIDE 1 MG: 2 INJECTION, SOLUTION INTRAMUSCULAR; INTRAVENOUS; SUBCUTANEOUS at 17:19

## 2019-05-22 RX ADMIN — CARVEDILOL 12.5 MG: 12.5 TABLET, FILM COATED ORAL at 21:30

## 2019-05-22 RX ADMIN — INSULIN LISPRO 191 UNITS: 100 INJECTION, SOLUTION INTRAVENOUS; SUBCUTANEOUS at 22:37

## 2019-05-22 RX ADMIN — SODIUM CHLORIDE, SODIUM LACTATE, POTASSIUM CHLORIDE, AND CALCIUM CHLORIDE: 600; 310; 30; 20 INJECTION, SOLUTION INTRAVENOUS at 16:20

## 2019-05-22 RX ADMIN — HYDROMORPHONE HYDROCHLORIDE 4 MG: 2 TABLET ORAL at 19:47

## 2019-05-22 RX ADMIN — ATORVASTATIN CALCIUM 20 MG: 20 TABLET, FILM COATED ORAL at 21:30

## 2019-05-22 RX ADMIN — FENTANYL CITRATE 25 MCG: 50 INJECTION, SOLUTION INTRAMUSCULAR; INTRAVENOUS at 16:11

## 2019-05-22 RX ADMIN — SODIUM CHLORIDE, SODIUM LACTATE, POTASSIUM CHLORIDE, AND CALCIUM CHLORIDE 50 ML/HR: 600; 310; 30; 20 INJECTION, SOLUTION INTRAVENOUS at 17:58

## 2019-05-22 RX ADMIN — HYDROMORPHONE HYDROCHLORIDE 0.5 MG: 2 INJECTION INTRAMUSCULAR; INTRAVENOUS; SUBCUTANEOUS at 18:18

## 2019-05-22 RX ADMIN — AMLODIPINE BESYLATE 2.5 MG: 2.5 TABLET ORAL at 21:30

## 2019-05-22 RX ADMIN — GABAPENTIN 400 MG: 400 CAPSULE ORAL at 21:30

## 2019-05-22 RX ADMIN — FENTANYL CITRATE 50 MCG: 50 INJECTION, SOLUTION INTRAMUSCULAR; INTRAVENOUS at 15:37

## 2019-05-22 RX ADMIN — KETAMINE HYDROCHLORIDE 20 MG: 10 INJECTION, SOLUTION INTRAMUSCULAR; INTRAVENOUS at 16:01

## 2019-05-22 RX ADMIN — ASPIRIN 81 MG: 81 TABLET, COATED ORAL at 21:30

## 2019-05-22 RX ADMIN — MIDAZOLAM HYDROCHLORIDE 2 MG: 1 INJECTION, SOLUTION INTRAMUSCULAR; INTRAVENOUS at 15:24

## 2019-05-22 RX ADMIN — HYDROMORPHONE HYDROCHLORIDE 0.5 MG: 2 INJECTION, SOLUTION INTRAMUSCULAR; INTRAVENOUS; SUBCUTANEOUS at 17:23

## 2019-05-22 RX ADMIN — HYDROMORPHONE HYDROCHLORIDE 8 MG: 2 TABLET ORAL at 23:39

## 2019-05-22 RX ADMIN — GLYCOPYRROLATE 0.2 MG: 0.2 INJECTION INTRAMUSCULAR; INTRAVENOUS at 15:29

## 2019-05-22 RX ADMIN — FENTANYL CITRATE 50 MCG: 50 INJECTION, SOLUTION INTRAMUSCULAR; INTRAVENOUS at 13:49

## 2019-05-22 RX ADMIN — LIDOCAINE HYDROCHLORIDE 100 MG: 20 INJECTION, SOLUTION EPIDURAL; INFILTRATION; INTRACAUDAL; PERINEURAL at 15:29

## 2019-05-22 RX ADMIN — KETAMINE HYDROCHLORIDE 30 MG: 10 INJECTION, SOLUTION INTRAMUSCULAR; INTRAVENOUS at 16:35

## 2019-05-22 RX ADMIN — FENTANYL CITRATE 25 MCG: 50 INJECTION, SOLUTION INTRAMUSCULAR; INTRAVENOUS at 15:58

## 2019-05-22 NOTE — ANESTHESIA PREPROCEDURE EVALUATION
Relevant Problems   No relevant active problems       Anesthetic History   No history of anesthetic complications            Review of Systems / Medical History  Patient summary reviewed, nursing notes reviewed and pertinent labs reviewed    Pulmonary          Undiagnosed apnea         Neuro/Psych   Within defined limits           Cardiovascular    Hypertension: well controlled          Hyperlipidemia         GI/Hepatic/Renal     GERD: well controlled           Endo/Other    Diabetes: well controlled, type 2    Morbid obesity and arthritis     Other Findings              Physical Exam    Airway  Mallampati: II  TM Distance: 4 - 6 cm  Neck ROM: normal range of motion   Mouth opening: Normal     Cardiovascular               Dental  No notable dental hx       Pulmonary                 Abdominal         Other Findings            Anesthetic Plan    ASA: 3  Anesthesia type: general          Induction: Intravenous  Anesthetic plan and risks discussed with: Patient and Son / Daughter

## 2019-05-22 NOTE — ANESTHESIA POSTPROCEDURE EVALUATION
Procedure(s):  LEFT HIP INCISION AND DRAINAGE, LEFT TOTAL HIP REVISION EXCHANGE OF HEAD BALL AND LINER, PLACEMENT OF ANTIBIOTIC BEADS.    general    Anesthesia Post Evaluation      Multimodal analgesia: multimodal analgesia used between 6 hours prior to anesthesia start to PACU discharge  Patient location during evaluation: PACU  Patient participation: complete - patient participated  Level of consciousness: awake and alert  Pain score: 3  Pain management: adequate  Airway patency: patent  Anesthetic complications: no  Cardiovascular status: acceptable and stable  Respiratory status: nasal cannula  Hydration status: stable  Post anesthesia nausea and vomiting:  none      Vitals Value Taken Time   /50 5/22/2019  6:25 PM   Temp 36.4 °C (97.5 °F) 5/22/2019  5:28 PM   Pulse 75 5/22/2019  6:30 PM   Resp 13 5/22/2019  6:30 PM   SpO2 98 % 5/22/2019  6:30 PM   Vitals shown include unvalidated device data.

## 2019-05-22 NOTE — PERIOP NOTES
TRANSFER - OUT REPORT:    Verbal report given to Abdelrahman RN (name) on Renata Campos  being transferred to 2 S (unit) for routine progression of care       Report consisted of patients Situation, Background, Assessment and   Recommendations(SBAR). Information from the following report(s) SBAR and Kardex was reviewed with the receiving nurse. Lines:   Peripheral IV 05/20/19 Left Antecubital (Active)       Peripheral IV 05/22/19 Left Forearm (Active)   Site Assessment Clean, dry, & intact 5/22/2019  5:28 PM   Phlebitis Assessment 0 5/22/2019  5:28 PM   Infiltration Assessment 0 5/22/2019  5:28 PM   Dressing Status Clean, dry, & intact 5/22/2019  5:28 PM   Dressing Type Transparent;Tape 5/22/2019  5:28 PM   Hub Color/Line Status Infusing 5/22/2019  5:28 PM      Date 05/21/19 0700 - 05/22/19 0659(Not Admitted) 05/22/19 0700 - 05/23/19 0659   Shift 2779-4025 7692-0631 24 Hour Total 2870-8462 7494-9653 24 Hour Total   INTAKE   I.V.    2500  2500     I.V.    600  600     Volume (lactated Ringers infusion)    1900  1900   Shift Total(mL/kg)    2500(18.5)  2500(18.5)   OUTPUT   Blood    150  150     Estimated Blood Loss    150  150   Shift Total(mL/kg)    150(1.1)  150(1.1)   NET    2350  2350   Weight (kg)    135.3 135.3 135.3         Opportunity for questions and clarification was provided.       Patient transported with:   O2 @ 2 liters  Registered Nurse

## 2019-05-22 NOTE — INTERVAL H&P NOTE
H&P Update:  Liliana Chavez was seen and examined. History and physical has been reviewed. The patient has been examined. There have been no significant clinical changes since the completion of the originally dated History and Physical.  Patient identified by surgeon; surgical site was confirmed by patient and surgeon.

## 2019-05-22 NOTE — PERIOP NOTES
TRANSFER - IN REPORT:    Verbal report received from [address], [address] nurse (name) on Ralf Kidd  being received from OR (unit) for routine progression of care      Report consisted of patients Situation, Background, Assessment and   Recommendations(SBAR). Information from the following report(s) OR Summary, Procedure Summary, Intake/Output and MAR was reviewed with the receiving nurse. Opportunity for questions and clarification was provided. Assessment completed upon patients arrival to unit and care assumed.

## 2019-05-22 NOTE — PROGRESS NOTES
1850 Received from PACU. Skin assessment WDL. 2000 Bedside and Verbal shift change report given to Ashley Ruffin Rn (oncoming nurse) by King Griffiths RN (offgoing nurse). Report included the following information SBAR, Kardex, MAR, Recent Results and Med Rec Status.

## 2019-05-22 NOTE — PERIOP NOTES
Pt. Used restroom in pre-op area with assistance. Patient placed on Daisy Paws for a minimum of 30 min in  Preop.

## 2019-05-23 ENCOUNTER — APPOINTMENT (OUTPATIENT)
Dept: INTERVENTIONAL RADIOLOGY/VASCULAR | Age: 59
DRG: 467 | End: 2019-05-23
Attending: PHYSICIAN ASSISTANT
Payer: COMMERCIAL

## 2019-05-23 ENCOUNTER — APPOINTMENT (OUTPATIENT)
Dept: WOUND CARE | Age: 59
End: 2019-05-23

## 2019-05-23 PROBLEM — T84.52XA PROSTHETIC JOINT INFECTION OF LEFT HIP (HCC): Chronic | Status: ACTIVE | Noted: 2019-05-22

## 2019-05-23 LAB
ANION GAP SERPL CALC-SCNC: 7 MMOL/L (ref 3–18)
BASOPHILS # BLD: 0 K/UL (ref 0–0.1)
BASOPHILS NFR BLD: 0 % (ref 0–2)
BUN SERPL-MCNC: 12 MG/DL (ref 7–18)
BUN/CREAT SERPL: 12 (ref 12–20)
CALCIUM SERPL-MCNC: 8.5 MG/DL (ref 8.5–10.1)
CHLORIDE SERPL-SCNC: 99 MMOL/L (ref 100–108)
CO2 SERPL-SCNC: 31 MMOL/L (ref 21–32)
CREAT SERPL-MCNC: 0.97 MG/DL (ref 0.6–1.3)
CRP SERPL-MCNC: 10.5 MG/DL (ref 0–0.3)
DIFFERENTIAL METHOD BLD: ABNORMAL
EOSINOPHIL # BLD: 0.1 K/UL (ref 0–0.4)
EOSINOPHIL NFR BLD: 1 % (ref 0–5)
ERYTHROCYTE [DISTWIDTH] IN BLOOD BY AUTOMATED COUNT: 13.7 % (ref 11.6–14.5)
GLUCOSE BLD STRIP.AUTO-MCNC: 154 MG/DL (ref 70–110)
GLUCOSE BLD STRIP.AUTO-MCNC: 158 MG/DL (ref 70–110)
GLUCOSE BLD STRIP.AUTO-MCNC: 181 MG/DL (ref 70–110)
GLUCOSE BLD STRIP.AUTO-MCNC: 196 MG/DL (ref 70–110)
GLUCOSE SERPL-MCNC: 151 MG/DL (ref 74–99)
HCT VFR BLD AUTO: 30.5 % (ref 36–48)
HCT VFR BLD AUTO: 30.8 % (ref 36–48)
HGB BLD-MCNC: 9.7 G/DL (ref 13–16)
HGB BLD-MCNC: 9.8 G/DL (ref 13–16)
LYMPHOCYTES # BLD: 2.8 K/UL (ref 0.9–3.6)
LYMPHOCYTES NFR BLD: 29 % (ref 21–52)
MCH RBC QN AUTO: 28.6 PG (ref 24–34)
MCHC RBC AUTO-ENTMCNC: 31.8 G/DL (ref 31–37)
MCV RBC AUTO: 90 FL (ref 74–97)
MONOCYTES # BLD: 1.1 K/UL (ref 0.05–1.2)
MONOCYTES NFR BLD: 11 % (ref 3–10)
NEUTS SEG # BLD: 5.7 K/UL (ref 1.8–8)
NEUTS SEG NFR BLD: 59 % (ref 40–73)
PLATELET # BLD AUTO: 405 K/UL (ref 135–420)
PMV BLD AUTO: 8.5 FL (ref 9.2–11.8)
POTASSIUM SERPL-SCNC: 4.2 MMOL/L (ref 3.5–5.5)
RBC # BLD AUTO: 3.39 M/UL (ref 4.7–5.5)
SODIUM SERPL-SCNC: 137 MMOL/L (ref 136–145)
WBC # BLD AUTO: 9.7 K/UL (ref 4.6–13.2)

## 2019-05-23 PROCEDURE — 85025 COMPLETE CBC W/AUTO DIFF WBC: CPT

## 2019-05-23 PROCEDURE — 74011250637 HC RX REV CODE- 250/637: Performed by: PHYSICIAN ASSISTANT

## 2019-05-23 PROCEDURE — 97161 PT EVAL LOW COMPLEX 20 MIN: CPT

## 2019-05-23 PROCEDURE — 65270000029 HC RM PRIVATE

## 2019-05-23 PROCEDURE — 36415 COLL VENOUS BLD VENIPUNCTURE: CPT

## 2019-05-23 PROCEDURE — 74011636637 HC RX REV CODE- 636/637: Performed by: PHYSICIAN ASSISTANT

## 2019-05-23 PROCEDURE — C1751 CATH, INF, PER/CENT/MIDLINE: HCPCS

## 2019-05-23 PROCEDURE — 85018 HEMOGLOBIN: CPT

## 2019-05-23 PROCEDURE — 74011250636 HC RX REV CODE- 250/636: Performed by: PHYSICIAN ASSISTANT

## 2019-05-23 PROCEDURE — 74011250637 HC RX REV CODE- 250/637: Performed by: INTERNAL MEDICINE

## 2019-05-23 PROCEDURE — 82962 GLUCOSE BLOOD TEST: CPT

## 2019-05-23 PROCEDURE — 80048 BASIC METABOLIC PNL TOTAL CA: CPT

## 2019-05-23 PROCEDURE — 97116 GAIT TRAINING THERAPY: CPT

## 2019-05-23 RX ORDER — HEPARIN SODIUM (PORCINE) LOCK FLUSH IV SOLN 100 UNIT/ML 100 UNIT/ML
SOLUTION INTRAVENOUS
Status: DISPENSED
Start: 2019-05-23 | End: 2019-05-23

## 2019-05-23 RX ORDER — NALOXONE HYDROCHLORIDE 4 MG/.1ML
SPRAY NASAL
Qty: 1 EACH | Refills: 0 | Status: SHIPPED | OUTPATIENT
Start: 2019-05-23 | End: 2019-05-24

## 2019-05-23 RX ORDER — RIFAMPIN 300 MG/1
300 CAPSULE ORAL EVERY 12 HOURS
Status: DISCONTINUED | OUTPATIENT
Start: 2019-05-23 | End: 2019-05-24 | Stop reason: HOSPADM

## 2019-05-23 RX ORDER — HEPARIN SODIUM (PORCINE) LOCK FLUSH IV SOLN 100 UNIT/ML 100 UNIT/ML
500 SOLUTION INTRAVENOUS
Status: DISCONTINUED | OUTPATIENT
Start: 2019-05-23 | End: 2019-05-24 | Stop reason: HOSPADM

## 2019-05-23 RX ORDER — LIDOCAINE HYDROCHLORIDE 10 MG/ML
1-20 INJECTION INFILTRATION; PERINEURAL
Status: ACTIVE | OUTPATIENT
Start: 2019-05-23 | End: 2019-05-23

## 2019-05-23 RX ORDER — NALOXONE HYDROCHLORIDE 4 MG/.1ML
SPRAY NASAL
Qty: 1 EACH | Refills: 0 | Status: SHIPPED | OUTPATIENT
Start: 2019-05-23

## 2019-05-23 RX ORDER — HEPARIN SODIUM 200 [USP'U]/100ML
INJECTION, SOLUTION INTRAVENOUS
Status: DISPENSED
Start: 2019-05-23 | End: 2019-05-23

## 2019-05-23 RX ORDER — HEPARIN SODIUM 200 [USP'U]/100ML
500 INJECTION, SOLUTION INTRAVENOUS
Status: ACTIVE | OUTPATIENT
Start: 2019-05-23 | End: 2019-05-23

## 2019-05-23 RX ORDER — GUAIFENESIN 100 MG/5ML
81 LIQUID (ML) ORAL 2 TIMES DAILY
Qty: 42 TAB | Refills: 0 | Status: SHIPPED | OUTPATIENT
Start: 2019-05-23

## 2019-05-23 RX ORDER — HYDROMORPHONE HYDROCHLORIDE 4 MG/1
4-8 TABLET ORAL
Qty: 60 TAB | Refills: 0 | Status: SHIPPED | OUTPATIENT
Start: 2019-05-23 | End: 2019-05-28

## 2019-05-23 RX ADMIN — Medication 10 ML: at 07:16

## 2019-05-23 RX ADMIN — Medication 10 ML: at 15:11

## 2019-05-23 RX ADMIN — ACETAMINOPHEN 650 MG: 325 TABLET ORAL at 19:37

## 2019-05-23 RX ADMIN — HYDROMORPHONE HYDROCHLORIDE 8 MG: 2 TABLET ORAL at 19:37

## 2019-05-23 RX ADMIN — CEFAZOLIN SODIUM 3 G: 10 INJECTION, POWDER, FOR SOLUTION INTRAVENOUS at 15:07

## 2019-05-23 RX ADMIN — LORATADINE 10 MG: 10 TABLET ORAL at 08:49

## 2019-05-23 RX ADMIN — VANCOMYCIN HYDROCHLORIDE 2000 MG: 10 INJECTION, POWDER, LYOPHILIZED, FOR SOLUTION INTRAVENOUS at 18:34

## 2019-05-23 RX ADMIN — INSULIN LISPRO 2 UNITS: 100 INJECTION, SOLUTION INTRAVENOUS; SUBCUTANEOUS at 18:30

## 2019-05-23 RX ADMIN — IRBESARTAN 150 MG: 150 TABLET ORAL at 08:47

## 2019-05-23 RX ADMIN — ASPIRIN 81 MG: 81 TABLET, COATED ORAL at 12:08

## 2019-05-23 RX ADMIN — RIFAMPIN 300 MG: 300 CAPSULE ORAL at 21:47

## 2019-05-23 RX ADMIN — CARVEDILOL 12.5 MG: 12.5 TABLET, FILM COATED ORAL at 08:49

## 2019-05-23 RX ADMIN — Medication 10 ML: at 14:00

## 2019-05-23 RX ADMIN — ACETAMINOPHEN 650 MG: 325 TABLET ORAL at 15:08

## 2019-05-23 RX ADMIN — INSULIN LISPRO 2 UNITS: 100 INJECTION, SOLUTION INTRAVENOUS; SUBCUTANEOUS at 08:46

## 2019-05-23 RX ADMIN — ATORVASTATIN CALCIUM 20 MG: 20 TABLET, FILM COATED ORAL at 21:47

## 2019-05-23 RX ADMIN — INSULIN LISPRO 2 UNITS: 100 INJECTION, SOLUTION INTRAVENOUS; SUBCUTANEOUS at 12:07

## 2019-05-23 RX ADMIN — CEFAZOLIN SODIUM 3 G: 10 INJECTION, POWDER, FOR SOLUTION INTRAVENOUS at 23:12

## 2019-05-23 RX ADMIN — VANCOMYCIN HYDROCHLORIDE 2000 MG: 10 INJECTION, POWDER, LYOPHILIZED, FOR SOLUTION INTRAVENOUS at 03:44

## 2019-05-23 RX ADMIN — GABAPENTIN 400 MG: 400 CAPSULE ORAL at 08:51

## 2019-05-23 RX ADMIN — ASPIRIN 81 MG: 81 TABLET, COATED ORAL at 21:47

## 2019-05-23 RX ADMIN — FERROUS SULFATE TAB 325 MG (65 MG ELEMENTAL FE) 325 MG: 325 (65 FE) TAB at 15:08

## 2019-05-23 RX ADMIN — FERROUS SULFATE TAB 325 MG (65 MG ELEMENTAL FE) 325 MG: 325 (65 FE) TAB at 08:48

## 2019-05-23 RX ADMIN — INSULIN LISPRO 2 UNITS: 100 INJECTION, SOLUTION INTRAVENOUS; SUBCUTANEOUS at 21:47

## 2019-05-23 RX ADMIN — GABAPENTIN 400 MG: 400 CAPSULE ORAL at 21:47

## 2019-05-23 RX ADMIN — HYDROMORPHONE HYDROCHLORIDE 8 MG: 2 TABLET ORAL at 03:45

## 2019-05-23 RX ADMIN — HYDROMORPHONE HYDROCHLORIDE 8 MG: 2 TABLET ORAL at 08:47

## 2019-05-23 RX ADMIN — ACETAMINOPHEN 650 MG: 325 TABLET ORAL at 08:48

## 2019-05-23 RX ADMIN — AMLODIPINE BESYLATE 5 MG: 5 TABLET ORAL at 08:49

## 2019-05-23 RX ADMIN — CARVEDILOL 12.5 MG: 12.5 TABLET, FILM COATED ORAL at 18:30

## 2019-05-23 RX ADMIN — HYDROMORPHONE HYDROCHLORIDE 8 MG: 2 TABLET ORAL at 12:50

## 2019-05-23 RX ADMIN — CEFAZOLIN SODIUM 3 G: 10 INJECTION, POWDER, FOR SOLUTION INTRAVENOUS at 07:16

## 2019-05-23 RX ADMIN — GABAPENTIN 400 MG: 400 CAPSULE ORAL at 15:08

## 2019-05-23 RX ADMIN — ACETAMINOPHEN 650 MG: 325 TABLET ORAL at 02:21

## 2019-05-23 RX ADMIN — PANTOPRAZOLE SODIUM 40 MG: 40 TABLET, DELAYED RELEASE ORAL at 08:47

## 2019-05-23 RX ADMIN — RIFAMPIN 300 MG: 300 CAPSULE ORAL at 18:36

## 2019-05-23 RX ADMIN — AMLODIPINE BESYLATE 2.5 MG: 2.5 TABLET ORAL at 21:47

## 2019-05-23 RX ADMIN — FERROUS SULFATE TAB 325 MG (65 MG ELEMENTAL FE) 325 MG: 325 (65 FE) TAB at 21:47

## 2019-05-23 NOTE — PROGRESS NOTES
Reason for Admission:   L hip I&D, placement of abx beads                   RRAT Score:     9                Plan for utilizing home health:      [facility]                    Current Advanced Directive/Advance Care Plan:     Likelihood of Readmission:  yellow                         Transition of Care Plan:        Chart reviewed, met with pt at bedside. Pt has cleared PT, has PICC placed, awaiting ID consult. 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 Discussed home with IV abx- HH vs [facility] if once daily. Pt would like HH, FOC offered, pt chose [facility] [phone] for follow up; referral placed with CMS. Pt comfortable with learning IV abx administration, is aware he will need ID follow up scheduled prior to discharge. CM following to coordinate discharge home. 1630-  Noted ID plan for ancef q 8hr tomorrow if possible. Spoke with pt to let him know that [facility] will not be able to see pt for [facility] since [employer] (infusion company) has contract with his insurance provider to provide nursing. Pt agreeable to whichever plan can allow him to discharge in timely manner. Per [employer] rep Júnior Blanton will come out tomorrow to teach pt pump, in anticipation of q8h IV abx therapy, will coordinate with pt for time. Have also placed referrals to [address] [place] [address] to see if they are able to assist in the event [employer] unable to assist. Per wound clinic, pt has appt with MD for wound management. CM following to finalize plan. Care Management Interventions  PCP Verified by CM:  Yes  Transition of Care Consult (CM Consult): [address]: No  Reason Outside [place]: Patient already serviced by other home care/hospice agency([facility])  Discharge Durable Medical Equipment: No  Physical Therapy Consult: Yes  Occupational Therapy Consult: Yes  Current Support Network: Lives with Spouse, Own Home  Confirm Follow Up Transport: Family  Plan discussed with Pt/Family/Caregiver: Yes  Freedom of Choice Offered: Yes  Discharge Location  Discharge Placement: Home with home health

## 2019-05-23 NOTE — PROGRESS NOTES
conducted an initial consultation and Spiritual Assessment for Tanja Be Sport, who is a 64 y. o.,male. According to the patients chart Tenriism Affiliation is: Hinduism. The reason the Patient came to the hospital is:   Patient Active Problem List    Diagnosis Date Noted    Prosthetic joint infection of left hip ([address] 05/22/2019    Cellulitis of left thigh 05/20/2019    Open wound of left hip 05/01/2019    Postoperative wound infection of left hip 03/13/2019    Left hip postoperative wound infection 03/12/2019    Osteoarthritis of left hip 02/08/2019    Acute medial meniscus tear of left knee 12/21/2017    High cholesterol 12/18/2017    HTN (hypertension) 12/18/2017    Tear of medial meniscus of left knee, current 12/18/2017        Initiated a relationship of care and support. Provided information about Spiritual Care Services. Offered prayer and assurance of continued prayers on patients behalf. Offered Sacrament of the [address] Plan:  Chaplains will continue to follow and will provide pastoral care as needed or requested.  recommends bedside caregivers page  on duty if patient shows signs of acute spiritual or emotional distress. Father [facility] - Office

## 2019-05-23 NOTE — PROGRESS NOTES
Pharmacy Dosing Services: Vancomycin    Consult for Vancomycin Dosing by Pharmacy by [address] [place] [address] provided for this 64y.o. year old male , for indication of Surgical Site Infection. Day of Therapy 1    Ht Readings from Last 1 Encounters:   05/22/19 177.8 cm (70\")        Wt Readings from Last 1 Encounters:   05/22/19 135.3 kg (298 lb 5 oz)        Other Current Antibiotics Ancef   Significant Cultures pending   Serum Creatinine Lab Results   Component Value Date/Time    Creatinine 1.03 05/22/2019 06:15 PM        Creatinine Clearance Estimated Creatinine Clearance: 109.6 mL/min (based on SCr of 1.03 mg/dL). BUN Lab Results   Component Value Date/Time    BUN 15 05/22/2019 06:15 PM        WBC Lab Results   Component Value Date/Time    WBC 9.8 05/20/2019 09:59 AM        H/H Lab Results   Component Value Date/Time    HGB 9.9 (L) 05/22/2019 06:15 PM        Platelets Lab Results   Component Value Date/Time    PLATELET 468 (H) 65/12/6277 09:59 AM        Temp 98 °F (36.7 °C)     Start Vancomycin therapy as Vancomycin 2gm IV q12h. Dose calculated to approximate a therapeutic trough of 15-20 mcg/mL. Pharmacy to follow daily and will make changes to dose and/or frequency based on clinical status.     Seferino Leija, [facility]

## 2019-05-23 NOTE — WOUND CARE
Wound consult received, pt with surgical dressing from surgery 5/22, pt to follow up in wound clinic at discharge. Dressing changes per surgical orders.

## 2019-05-23 NOTE — PROGRESS NOTES
Progress Note      Patient: Chang Dixon               Sex: male          DOA: 5/22/2019     YOB: 1960      Age:  64 y.o.        LOS:  LOS: 1 day     Status Post: Procedure(s):  LEFT HIP INCISION AND DRAINAGE, LEFT TOTAL HIP REVISION EXCHANGE OF HEAD BALL AND LINER, PLACEMENT OF ANTIBIOTIC BEADS  Surgery Date: 5/22/2019            Subjective:     Chang Dixon is a 64 y.o. male without c/o nausea. Pain well controlled with PO medications. Patient denies any complaint of calf pain/ SOB or difficulty breathing. Objective:      Visit Vitals  /77 (BP 1 Location: Left arm, BP Patient Position: At rest;Supine)   Pulse 91   Temp 99.8 °F (37.7 °C)   Resp 18   Ht 5' 10\" (1.778 m)   Wt 135.3 kg (298 lb 5 oz)   SpO2 96%   BMI 42.80 kg/m²       Physical Exam:   Dressing:  Does show some bloody discharge. It has been changed by nurse today. Wound care consult pending. Wiggles Toes/Ankle  Foot sensation intact to light touch  No foot edema/ +1 Posterior Tibial Pulse  Leg Lengths appear equal  Calf soft, supple and non tender. Negative Homans sign    Xray - Looks good    Intake and Output:  Current Shift:  05/22 1901 - 05/23 0700  In: 560 [P.O.:200; I.V.:360]  Out: 56 [Urine:675]  Last three shifts:  05/21 0701 - 05/22 1900  In: 2500 [I.V.:2500]  Out: 150   Voiding Status:  + void without need for Banerjee catheter    Lab/Data Reviewed:  Recent Labs     05/23/19  0230   HGB 9.7*  9.8*   HCT 30.5*  30.8*      K 4.2   BUN 12   CREA 0.97   *         Medications Reviewed    Assessment/Plan     Principal Problem:    Prosthetic joint infection of left hip ([address] (5/22/2019)    Active Problems:    High cholesterol (12/18/2017)      HTN (hypertension) (12/18/2017)      Left hip postoperative wound infection (3/12/2019)      Postoperative wound infection of left hip (3/13/2019)      . · Change IV to Saline Lock. · Discharge Planning for home.   · Begin DVT Prophylaxis - Aspirin 81 mg twice daily   · Continue PT WBAT, ROM as tolerated. Continue exercises hourly using the physical therapy exercises sheet. · ID consult today. Discussed with ID yesterday. Cultures pending. Continue with Vancomycin and Ancef. Appreciate ID consult. · PICC line placement today. The patient was seen and examined by Dr. Kasandra Rouse today as well and he is in agreement with above.

## 2019-05-23 NOTE — CONSULTS
Infectious Disease Consultation Note    Date of Admission: 5/22/2019    Date of Consultation: 5/23/2019    Referred by: Dr. Leatha Lewis, PA [address] [place] [address]       Reason for Referral: Prosthetic Joint Infection Left hip       Current Antimicrobials: Prior Antimicrobials   Vancomycin 5/22 - 1      Assessment / Plan:     Prosthetic Joint Infection L hip  -  L direct ant KATHERINE 2/13/2019   - subsequent superficial wd infection I&D 3/13. Cx MSSA broth only, rare CONS.    - MRSA screen January, 5/22 negative.    - s/p 5/22 L hip I&D of hip replacement, periprosthetic infection,revision L hip replacement (new liner, femoral head). (one stage exchange)  - Cx few possible Staphylococcus species. -> probably switch to Cefazolin 2 gm IV q 8h tomorrow if MSSA again in culture  -> planning for 6 weeks with po rifampin  -> rifampin 300 mg bid   Obesity    DM    HTN    Hypercholesterolemia    GERD    DJD      Microbiology:   5/22 Ed cx possible Staph spp   MRSA scrn neg    Lines / Catheters:   R PICC    HPI:     65 y/o Obese CM w/ DM, HTN, Hypercholesterolemia, GERD, DJD admitted to [facility] 5/22 for removal of infected Left hip prosthesis. He underwent Left direct anterior total hip arthroplasty on 2/13/2019 and subsequently developed a superficial wound infection which was incised and drained in the OR on 3/13. Culture of this grew MSSA in broth only and rare CONS. MRSA screen in January and again yesterday 5/22 were negative. On admission 5/22 he underwent left hip incision and drainage of hip replacement and periprosthetic infection and revision of left hip replacement (new liner, femoral head). Culture is growing few possible Staphylococcus species. Cefazolin and Vancomycin were started 5/22. One dose of rifampin 600 mg was given.      Active Hospital Problems    Diagnosis Date Noted    Prosthetic joint infection of left hip ([address] 05/22/2019    Postoperative wound infection of left hip 03/13/2019    Left hip postoperative wound infection 03/12/2019    High cholesterol 12/18/2017    HTN (hypertension) 12/18/2017     Past Medical History:   Diagnosis Date    Bursitis of right shoulder     Chronic pain     right shoulder and lower back.  Diabetes ([facility] 2015    GERD (gastroesophageal reflux disease)     Hypercholesterolemia     Hypertension 2015    Morbid obesity ([facility]     1- BMI 46.1    OA (osteoarthritis)      Past Surgical History:   Procedure Laterality Date    HX GI      EGD, Colonoscopy    HX HEENT Bilateral     lasik eye surgery    HX KNEE ARTHROSCOPY Left 12/2017    HX KNEE REPLACEMENT Left 02/13/2019    HX ORTHOPAEDIC Left     CTR    HX ORTHOPAEDIC Left 03/13/2019    hip superficial I&D    HX OTHER SURGICAL      wisdom teeth extraction ,bone graft    HX OTHER SURGICAL       History reviewed. No pertinent family history. Social History     Socioeconomic History    Marital status:      Spouse name: Not on file    Number of children: Not on file    Years of education: Not on file    Highest education level: Not on file   Occupational History    Not on file   Social Needs    Financial resource strain: Not on file    Food insecurity:     Worry: Not on file     Inability: Not on file    Transportation needs:     Medical: Not on file     Non-medical: Not on file   Tobacco Use    Smoking status: Current Some Day Smoker    Smokeless tobacco: Never Used    Tobacco comment: one cigar 3 x year   Substance and Sexual Activity    Alcohol use:  Yes     Alcohol/week: 3.6 oz     Types: 2 Glasses of wine, 2 Cans of beer, 2 Shots of liquor per week    Drug use: No    Sexual activity: Yes     Partners: Female   Lifestyle    Physical activity:     Days per week: Not on file     Minutes per session: Not on file    Stress: Not on file   Relationships    Social connections:     Talks on phone: Not on file     Gets together: Not on file     Attends Holiness service: Not on file     Active member of club or organization: Not on file     Attends meetings of clubs or organizations: Not on file     Relationship status: Not on file    Intimate partner violence:     Fear of current or ex partner: Not on file     Emotionally abused: Not on file     Physically abused: Not on file     Forced sexual activity: Not on file   Other Topics Concern    Not on file   Social History Narrative    Not on file       Allergies:    Bydureon [exenatide microspheres] .     Medications:     Current Facility-Administered Medications   Medication Dose Route Frequency    heparin (porcine) 100 unit/mL injection 500 Units  500 Units InterCATHeter Q8H PRN    lidocaine (XYLOCAINE) 10 mg/mL (1 %) injection 1-20 mL  1-20 mL SubCUTAneous RAD ONCE    heparinized saline 2 units/mL infusion 1,000 Units  500 mL Irrigation RAD ONCE    heparin (porcine) 100 unit/mL injection        heparinized saline 2 units/mL 1,000 unit/500 mL infusion        sodium chloride (NS) flush 5-40 mL  5-40 mL IntraVENous Q8H    sodium chloride (NS) flush 5-40 mL  5-40 mL IntraVENous PRN    fentaNYL citrate (PF) injection 50 mcg  50 mcg IntraVENous See Admin Instructions    0.9% sodium chloride infusion 250 mL  250 mL IntraVENous PRN    lactated Ringers infusion  125 mL/hr IntraVENous CONTINUOUS    sodium chloride (NS) flush 5-40 mL  5-40 mL IntraVENous Q8H    sodium chloride (NS) flush 5-40 mL  5-40 mL IntraVENous PRN    lactated Ringers infusion  75 mL/hr IntraVENous CONTINUOUS    sodium chloride (NS) flush 5-40 mL  5-40 mL IntraVENous Q8H    sodium chloride (NS) flush 5-40 mL  5-40 mL IntraVENous PRN    acetaminophen (TYLENOL) tablet 650 mg  650 mg Oral Q6H    naloxone (NARCAN) injection 0.4 mg  0.4 mg IntraVENous PRN    aspirin delayed-release tablet 81 mg  81 mg Oral BID    ferrous sulfate tablet 325 mg  1 Tab Oral TID    diphenhydrAMINE (BENADRYL) capsule 25 mg  25 mg Oral Q4H PRN    bisacodyl (DULCOLAX) suppository 10 mg  10 mg Rectal DAILY PRN    zolpidem (AMBIEN) tablet 5 mg  5 mg Oral QHS PRN    ceFAZolin (ANCEF) 3 g/20 mL in sterile water IV syringe  3 g IntraVENous Q8H    HYDROmorphone (DILAUDID) tablet 4-8 mg  4-8 mg Oral Q4H PRN    insulin lispro (HUMALOG) injection   SubCUTAneous AC&HS    glucose chewable tablet 16 g  4 Tab Oral PRN    glucagon (GLUCAGEN) injection 1 mg  1 mg IntraMUSCular PRN    dextrose (D50W) injection syrg 12.5-25 g  25-50 mL IntraVENous PRN    amLODIPine (NORVASC) tablet 2.5 mg  2.5 mg Oral QPM    amLODIPine (NORVASC) tablet 5 mg  5 mg Oral DAILY    atorvastatin (LIPITOR) tablet 20 mg  20 mg Oral QHS    carvedilol (COREG) tablet 12.5 mg  12.5 mg Oral BID WITH MEALS    pantoprazole (PROTONIX) tablet 40 mg  40 mg Oral DAILY    loratadine (CLARITIN) tablet 10 mg  10 mg Oral DAILY    gabapentin (NEURONTIN) capsule 400 mg  400 mg Oral TID    irbesartan (AVAPRO) tablet 300 mg  300 mg Oral DAILY    metaxalone (SKELAXIN) tablet 800 mg  800 mg Oral Q6H PRN    bacitracin 150,000 Units, polymyxin B 1,500,000 Units in lactated Ringers 3,000 mL Irrigation    PRN    bacitracin 100,000 Units, polymyxin B 1,000,000 Units in lactated Ringers 1,000 mL Irrigation    PRN    povidone-iodine 60 mL in lactated Ringers 1,000 mL Irrigation    PRN    vancomycin (VANCOCIN) 2000 mg in  ml infusion  2,000 mg IntraVENous Q12H    Vancomycin - Pharmacy to Dose  1 Each Other Rx Dosing/Monitoring       ROS:   A comprehensive review of systems was negative except for that written in the History of Present Illness. Physical Exam:     Temp (24hrs), Av.4 °F (36.9 °C), Min:97.5 °F (36.4 °C), Max:99.8 °F (37.7 °C)    Visit Vitals  /60   Pulse 73   Temp 98.6 °F (37 °C)   Resp 18   Ht 5' 10\" (1.778 m)   Wt 135.3 kg (298 lb 5 oz)   SpO2 93%   BMI 42.80 kg/m²       General: Well developed, obese 64 y.o.  male in no acute distress.   ENT: ENT exam normal, no neck nodes or sinus tenderness  Head: normocephalic, without obvious abnormality  Mouth:  mucous membranes moist, pharynx normal without lesions  Neck: supple, symmetrical, trachea midline   Cardio:  regular rate and rhythm, S1, S2 normal, no murmur, click, rub or gallop  Chest: inspection normal - no chest wall deformities or tenderness  Lungs: clear to auscultation and no wheezes or rales  Abdomen: soft, non-tender. Bowel sounds normal. No masses, no organomegaly.   Extremities:  no redness or tenderness in the calves or thighs, no edema, left hip wound not exained  Neuro: Grossly normal    Lab results:     Chemistry  Recent Labs     05/23/19  0230 05/22/19  1815   * 139*    141   K 4.2 4.7   CL 99* 105   CO2 31 28   BUN 12 15   CREA 0.97 1.03   CA 8.5 8.5   AGAP 7 8   BUCR 12 15       CBC w/ Diff  Recent Labs     05/23/19  0230 05/22/19 1815   WBC 9.7  --    RBC 3.39*  --    HGB 9.7*  9.8* 9.9*   HCT 30.5*  30.8* 30.2*     --    GRANS 59  --    LYMPH 29  --    EOS 1  --        Microbiology  All Micro Results     Procedure Component Value Units Date/Time    CULTURE, Doyce Splinter STAIN [[id number]]  (Abnormal) Collected:  05/22/19 1553    Order Status:  Completed Specimen:  Wound from Hip Updated:  05/23/19 1303     Special Requests: NO SPECIAL REQUESTS        GRAM STAIN FEW WBC'S         NO ORGANISMS SEEN        Culture result:       FEW POSSIBLE STAPHYLOCOCCUS SPECIES          CULTURE, ANAEROBIC [[id number]] Collected:  05/22/19 1553    Order Status:  Completed Specimen:  Hip Updated:  05/23/19 1258     Special Requests: NO SPECIAL REQUESTS        Culture result:       CULTURE IN PROGRESS,FURTHER UPDATES TO FOLLOW          URINE C&S [[id number]] Collected:  05/22/19 1241    Order Status:  Completed Specimen:  Urine from Clean catch Updated:  05/23/19 1144     Special Requests: NO SPECIAL REQUESTS        Culture result: NO GROWTH AFTER 13 HOURS       MRSA SCREEN - PCR (NASAL) [[id number]] Collected:  05/22/19 1325    Order Status:  Completed Specimen:  Nasal from Nares Updated:  05/22/19 2100     Special Requests: NO SPECIAL REQUESTS        Culture result:       MRSA target DNA is not detected (presumptive not colonized with MRSA)          MRSA SCREEN - PCR (NASAL) [[id number]]     Order Status:  Canceled Specimen:  Nasal from Nares     URINE C&S [[id number]]     Order Status:  Canceled Specimen:  Urine from 1125 Sintia Pappas MD, [facility]  Infectious Disease Specialist  Pager [phone]

## 2019-05-23 NOTE — PROGRESS NOTES
1946 - Assumed care at this time. Pain rated 5/10, pain medication administered per MAR. Pt resting quietly in bed, family at bedside. No signs of distress. Will continue to monitor. Pt encouraged to call for assistance. 2112 - Patient in bed at this time. Patient A&Ox4, RA. Denies chest pain and SOB. 18G IV to left FA  intact and patent. SCD compression device bilaterally. Foam tape dressing to left hip CDI. Denies numbness/tingling/calf pain. Pain 3/10 with a tolerable level of 2/10. Pt educated on IS use, q2h rounds, pain management, CHG wipes and \"Up for Meals\". Pt verbalized understanding, no concerns voiced. Call bell within reach, bed in lowest position. Pt encouraged to call for assistance. [phone] - Patient rated pain 6/10, pain medication administered per [facility] for pain mgmt. No other concerns voiced at this time. Call bell within reach, bed in lowest position. Pt encouraged to use call bell for any needs. 0064 - Patient rated pain 7/10, pain medication administered per MAR. No other concerns voiced at this time. Call bell within reach, bed in lowest position. Pt encouraged to use call bell for any needs. 6955 - TIERRA Chandrar notified that pain mgmt has been an issue. Pt's pain level has been treated with Dilaudid 8mg even with pain levels outside parameters. PA verbalized understanding. 9656 - Dressing changed per orders. 4x4/ABD/foam tape applied. Pt up in chair. No concerns voiced. Call bell and telephone within reach.

## 2019-05-23 NOTE — PROGRESS NOTES
7058- Patient in chair at this time. A/O x 4. IV to left FA  intact and patent. SCDS  to BLE. Foam tape and 4x4  dressing to left hip CDI. Patient denies numbness/tingling. Pedal and radial pulses palpable. Lungs clear. Bowel sounds active to all quadrants. Patient able to get to 2500 on the incentive spirometer. Pain 7/10.     0850 - Pain 7/10. PRN 8 mg dilaudid pain medication administered at this time. Patient has been educated on side effects. 1250- Pain 6/10. PRN 8 mg dilaudid pain medication administered at this time. Patient has been educated on side effects.

## 2019-05-23 NOTE — PROGRESS NOTES
Orders received. Chart reviewed. PT assessment attempted, however pt is currently JANE for PICC placement. Will attempt PT assessment at another time once pt's schedule allows.

## 2019-05-23 NOTE — PROGRESS NOTES
Problem: Mobility Impaired (Adult and Pediatric)  Goal: *Acute Goals and Plan of Care (Insert Text)  Description  Physical Therapy Goals   Initiated 5/23/2019 and to be accomplished within 3-5 day(s)  1. Patient will move from supine <> sit with S in prep for out of bed activity and change of position. 2.  Patient will perform sit<> stand with S with LRAD in prep for transfers/ambulation. 3.  Patient will transfer from bed <> chair with S with LRAD for time up in chair for completion of ADL activity. 4.  Patient will ambulate 150 feet with LRAD/S for improved functional mobility/safe discharge. 5.  Patient will ascend/descend 3-5 stairs with handrail with minimal assistance/contact guard assist for home re-entry as needed. Outcome: Progressing Towards Goal   PHYSICAL THERAPY EVALUATION & DISCHARGE    Patient: Qiana Adan (60 y.o. male)  Date: 5/23/2019  Primary Diagnosis: Postoperative wound infection of left hip [T81.49XA]  Periprosthetic fracture around internal prosthetic hip joint [M97. 8XXA, Z96.649]  Procedure(s) (LRB):  LEFT HIP INCISION AND DRAINAGE, LEFT TOTAL HIP REVISION EXCHANGE OF HEAD BALL AND LINER, PLACEMENT OF ANTIBIOTIC BEADS (Left) 1 Day Post-Op   Precautions:Fall, WBAT    ASSESSMENT AND RECOMMENDATIONS:  Based on the objective data described below, the patient presents with decrease independence in bed mobility, transfers, gait, and step negotiation. Pt seen supine prior to session w/ B/L SCDs. Pt reported 4/10 pain in L hip. Pt has met all goals at this time. Pt able to ambulate w/ RW/ ft w/ min Vcing and difficulty and no signs of LOB at this time. Pt able to perform step negotiation using HR  w/ min VCing and no signs of LOB. Pt transferred back to room where pt was educated on therex activity and able to perform task w/ min Vcing and difficulty. Pt left in supine after session, call bell and tray in reach, nurse notified after session.  Pt has met all goals at this time, DC from acute PT. Skilled physical therapy is not indicated at this time. Discharge Recommendations: Home Health  Further Equipment Recommendations for Discharge: N/A      SUBJECTIVE:   Patient stated ? I want to get everything done and over with today. ?    OBJECTIVE DATA SUMMARY:     Past Medical History:   Diagnosis Date    Bursitis of right shoulder     Chronic pain     right shoulder and lower back. Diabetes ([facility] [address] 2015    GERD (gastroesophageal reflux disease)     Hypercholesterolemia     Hypertension 2015    Morbid obesity ([facility] [address]     1- BMI 46.1    OA (osteoarthritis)      Past Surgical History:   Procedure Laterality Date    HX GI      EGD, Colonoscopy    HX HEENT Bilateral     lasik eye surgery    HX KNEE ARTHROSCOPY Left 12/2017    HX KNEE REPLACEMENT Left 02/13/2019    HX ORTHOPAEDIC Left     CTR    HX ORTHOPAEDIC Left 03/13/2019    hip superficial I&D    HX OTHER SURGICAL      wisdom teeth extraction ,bone graft    HX OTHER SURGICAL       Barriers to Learning/Limitations: yes;  physical  Compensate with: visual, verbal, tactile, kinesthetic cues/model  Prior Level of Function/Home Situation:   Home Situation  Home Environment: Private residence  # Steps to Enter: 5  Rails to Enter: Yes  Hand Rails : Bilateral  One/Two Story Residence: Two story  # of Interior Steps: 15  Interior Rails: Right  Living Alone: No  Support Systems: Family member(s), Spouse/Significant Other/Partner  Patient Expects to be Discharged to[de-identified] Private residence  Current DME Used/Available at Home: Walker, rolling  Critical Behavior:  Neurologic State: Alert  Orientation Level: Oriented X4  Psychosocial  Purposeful Interaction: Yes  Pt Identified Daily Priority: Clinical issues (comment)  Caritas Process: Nurture loving kindness;Establish trust;Teaching/learning;Supportive expression  Caring Interventions: Reassure  Reassure: Therapeutic listening; Informing;Caring rounds  Skin Condition/Temp: Warm;Dry  Skin Integrity: Incision (comment)  Skin Integumentary  Skin Color: Appropriate for ethnicity  Skin Condition/Temp: Warm;Dry  Skin Integrity: Incision (comment)  Turgor: Non-tenting  Hair Growth: Present  Varicosities: Absent  Strength:    Strength: Generally decreased, functional  Tone & Sensation:   Tone: Normal  Sensation: Intact  Range Of Motion:  AROM: Generally decreased, functional  Functional Mobility:  Bed Mobility:  Supine to Sit: Modified independent;Supervision  Sit to Supine: Modified independent;Supervision  Transfers:  Sit to Stand: Modified independent;Supervision  Stand to Sit: Modified independent;Supervision  Balance:   Sitting: Intact  Standing: Intact; With support  Ambulation/Gait Training:  Distance (ft): 300 Feet (ft)  Assistive Device: Gait belt;Walker, rolling  Ambulation - Level of Assistance: Modified independent;Supervision  Gait Description (WDL): Exceptions to WDL  Gait Abnormalities: Antalgic;Decreased step clearance  Left Side Weight Bearing: As tolerated  Base of Support: Shift to right  Stance: Left decreased  Speed/Estela: Slow  Step Length: Left shortened;Right shortened  Swing Pattern: Left asymmetrical;Right asymmetrical  Pain:  Pain Scale 1: Numeric (0 - 10)  Pain Intensity 1: 6  Pain Location 1: Hip  Pain Orientation 1: Left  Pain Description 1: Aching  Pain Intervention(s) 1: Medication (see MAR)  Activity Tolerance:   Good  Please refer to the flowsheet for vital signs taken during this treatment. After treatment:   ?         Patient left in no apparent distress sitting up in chair  ? Patient left in no apparent distress in bed  ? Call bell left within reach  ? Nursing notified  ? Caregiver present  ? Bed alarm activated    COMMUNICATION/EDUCATION:   ?         Fall prevention education was provided and the patient/caregiver indicated understanding. ? Patient/family have participated as able in goal setting and plan of care.   ? Patient/family agree to work toward stated goals and plan of care. ?         Patient understands intent and goals of therapy, but is neutral about his/her participation. ? Patient is unable to participate in goal setting and plan of care.     Thank you for this referral.  Beverly Trinh, PT   Time Calculation: 28 mins   Eval Complexity: History: HIGH Complexity :3+ comorbidities / personal factors will impact the outcome/ POC Exam:LOW Complexity : 1-2 Standardized tests and measures addressing body structure, function, activity limitation and / or participation in recreation  Presentation: LOW Complexity : Stable, uncomplicated  Clinical Decision Making:Low Complexity ambulate >30ft  Overall Complexity:LOW

## 2019-05-23 NOTE — PROGRESS NOTES
Problem: Falls - Risk of  Goal: *Absence of Falls  Description  Document Rosalia Solorzano Fall Risk and appropriate interventions in the flowsheet.   Outcome: Progressing Towards Goal     Problem: Patient Education: Go to Patient Education Activity  Goal: Patient/Family Education  Outcome: Progressing Towards Goal     Problem: Pain  Goal: *Control of Pain  Outcome: Progressing Towards Goal     Problem: Patient Education: Go to Patient Education Activity  Goal: Patient/Family Education  Outcome: Progressing Towards Goal     Problem: Patient Education: Go to Patient Education Activity  Goal: Patient/Family Education  Outcome: Progressing Towards Goal     Problem: Hip Replacement: Day of Surgery/Unit  Goal: Activity/Safety  Outcome: Progressing Towards Goal  Goal: Consults, if ordered  Outcome: Progressing Towards Goal  Goal: Diagnostic Test/Procedures  Outcome: Progressing Towards Goal  Goal: Nutrition/Diet  Outcome: Progressing Towards Goal  Goal: Medications  Outcome: Progressing Towards Goal  Goal: Respiratory  Outcome: Progressing Towards Goal  Goal: Treatments/Interventions/Procedures  Outcome: Progressing Towards Goal  Goal: Psychosocial  Outcome: Progressing Towards Goal  Goal: *Initiate mobility  Outcome: Progressing Towards Goal  Goal: *Optimal pain control at patient's stated goal  Outcome: Progressing Towards Goal  Goal: *Hemodynamically stable  Outcome: Progressing Towards Goal

## 2019-05-23 NOTE — ROUTINE PROCESS
Bedside and Verbal shift change report given to KIRILL Palacios RN by Gonzalez Schultz RN. Report included the following information SBAR, Kardex, OR Summary, Intake/Output and MAR.

## 2019-05-24 VITALS
RESPIRATION RATE: 16 BRPM | TEMPERATURE: 99 F | SYSTOLIC BLOOD PRESSURE: 139 MMHG | HEART RATE: 101 BPM | HEIGHT: 70 IN | WEIGHT: 298.31 LBS | BODY MASS INDEX: 42.71 KG/M2 | OXYGEN SATURATION: 97 % | DIASTOLIC BLOOD PRESSURE: 82 MMHG

## 2019-05-24 LAB
ANION GAP SERPL CALC-SCNC: 8 MMOL/L (ref 3–18)
BACTERIA SPEC CULT: NORMAL
BUN SERPL-MCNC: 12 MG/DL (ref 7–18)
BUN/CREAT SERPL: 15 (ref 12–20)
CALCIUM SERPL-MCNC: 8.6 MG/DL (ref 8.5–10.1)
CHLORIDE SERPL-SCNC: 102 MMOL/L (ref 100–108)
CO2 SERPL-SCNC: 30 MMOL/L (ref 21–32)
CREAT SERPL-MCNC: 0.78 MG/DL (ref 0.6–1.3)
GLUCOSE BLD STRIP.AUTO-MCNC: 127 MG/DL (ref 70–110)
GLUCOSE BLD STRIP.AUTO-MCNC: 162 MG/DL (ref 70–110)
GLUCOSE SERPL-MCNC: 126 MG/DL (ref 74–99)
HCT VFR BLD AUTO: 26.6 % (ref 36–48)
HGB BLD-MCNC: 8.5 G/DL (ref 13–16)
POTASSIUM SERPL-SCNC: 3.9 MMOL/L (ref 3.5–5.5)
SERVICE CMNT-IMP: NORMAL
SODIUM SERPL-SCNC: 140 MMOL/L (ref 136–145)

## 2019-05-24 PROCEDURE — 82962 GLUCOSE BLOOD TEST: CPT

## 2019-05-24 PROCEDURE — 74011250637 HC RX REV CODE- 250/637: Performed by: PHYSICIAN ASSISTANT

## 2019-05-24 PROCEDURE — 74011250636 HC RX REV CODE- 250/636: Performed by: PHYSICIAN ASSISTANT

## 2019-05-24 PROCEDURE — 74011636637 HC RX REV CODE- 636/637: Performed by: PHYSICIAN ASSISTANT

## 2019-05-24 PROCEDURE — 36592 COLLECT BLOOD FROM PICC: CPT

## 2019-05-24 PROCEDURE — 74011250637 HC RX REV CODE- 250/637: Performed by: INTERNAL MEDICINE

## 2019-05-24 PROCEDURE — 80048 BASIC METABOLIC PNL TOTAL CA: CPT

## 2019-05-24 PROCEDURE — 85018 HEMOGLOBIN: CPT

## 2019-05-24 RX ORDER — RIFAMPIN 150 MG/1
300 CAPSULE ORAL
Qty: 84 CAP | Refills: 0 | Status: SHIPPED | OUTPATIENT
Start: 2019-05-24

## 2019-05-24 RX ADMIN — CEFAZOLIN SODIUM 3 G: 10 INJECTION, POWDER, FOR SOLUTION INTRAVENOUS at 07:04

## 2019-05-24 RX ADMIN — Medication 10 ML: at 07:04

## 2019-05-24 RX ADMIN — CARVEDILOL 12.5 MG: 12.5 TABLET, FILM COATED ORAL at 09:10

## 2019-05-24 RX ADMIN — ACETAMINOPHEN 650 MG: 325 TABLET ORAL at 02:57

## 2019-05-24 RX ADMIN — VANCOMYCIN HYDROCHLORIDE 2000 MG: 10 INJECTION, POWDER, LYOPHILIZED, FOR SOLUTION INTRAVENOUS at 03:42

## 2019-05-24 RX ADMIN — HYDROMORPHONE HYDROCHLORIDE 8 MG: 2 TABLET ORAL at 13:04

## 2019-05-24 RX ADMIN — IRBESARTAN 150 MG: 150 TABLET ORAL at 09:10

## 2019-05-24 RX ADMIN — INSULIN LISPRO 2 UNITS: 100 INJECTION, SOLUTION INTRAVENOUS; SUBCUTANEOUS at 11:30

## 2019-05-24 RX ADMIN — ACETAMINOPHEN 650 MG: 325 TABLET ORAL at 09:09

## 2019-05-24 RX ADMIN — LORATADINE 10 MG: 10 TABLET ORAL at 09:09

## 2019-05-24 RX ADMIN — ASPIRIN 81 MG: 81 TABLET, COATED ORAL at 09:10

## 2019-05-24 RX ADMIN — AMLODIPINE BESYLATE 5 MG: 5 TABLET ORAL at 09:09

## 2019-05-24 RX ADMIN — ACETAMINOPHEN 650 MG: 325 TABLET ORAL at 13:04

## 2019-05-24 RX ADMIN — HYDROMORPHONE HYDROCHLORIDE 8 MG: 2 TABLET ORAL at 07:03

## 2019-05-24 RX ADMIN — FERROUS SULFATE TAB 325 MG (65 MG ELEMENTAL FE) 325 MG: 325 (65 FE) TAB at 09:09

## 2019-05-24 RX ADMIN — CEFAZOLIN SODIUM 3 G: 10 INJECTION, POWDER, FOR SOLUTION INTRAVENOUS at 14:03

## 2019-05-24 RX ADMIN — RIFAMPIN 300 MG: 300 CAPSULE ORAL at 09:18

## 2019-05-24 RX ADMIN — GABAPENTIN 400 MG: 400 CAPSULE ORAL at 09:09

## 2019-05-24 RX ADMIN — PANTOPRAZOLE SODIUM 40 MG: 40 TABLET, DELAYED RELEASE ORAL at 09:09

## 2019-05-24 RX ADMIN — Medication 10 ML: at 14:00

## 2019-05-24 NOTE — PROGRESS NOTES
Infectious Disease Follow-up Note      Date of Admission: 5/22/2019     Date of Note:  5/24/2019      Summary:     63 y/o CM Obese, HTN, Hyperchol, DM w/ L hip PJI s/p one stage exchange 5/22. Cx Staph aureus. Prior wd infection MSSA, CONS    Interval History:     Ambulating independently in hallways with walker. Reports still some drainage from wound. Otherwise doing well    Current Antimicrobials: Prior Antimicrobials   Cefazolin 5/23 - 1  Vancomycin 5/22 - 2      Assessment / Plan:      Prosthetic Joint Infection L hip  -  L direct ant KATHERINE 2/13/2019   - subsequent superficial wd infection I&D 3/13. Cx MSSA broth only, rare CONS.    - MRSA screen January, 5/22 negative.    - s/p 5/22 L hip I&D of hip replacement, periprosthetic infection,revision L hip replacement (new liner, femoral head). (one stage exchange)  - Cx few Staphylococcus aureus. (most likely MSSA again) -> dc Vancomycin  -> start Cefazolin 2 gm IV q 8h x 6 weeks (end date: 7/3/2019)  -> rifampin 300 mg bid for 6 weeks  -> CBC, CMP, ESR, CRP q Monday. Fax results to [phone]. Follow up with ID at [facility] 0117 after one week. Call [phone] for appointment.   -> N.B. Have left message in Micro lab to alert me in case isolate is MRSA in which case I will call [facility] to change to Vancomcyin   Obesity     DM     HTN     Hypercholesterolemia     GERD     DJD        Microbiology:   5/22      Ed cx possible Staph spp               MRSA scrn neg     Lines / Catheters:   R PICC      Patient Active Problem List   Diagnosis Code    High cholesterol E78.00    HTN (hypertension) I10    Tear of medial meniscus of left knee, current S83.242A    Acute medial meniscus tear of left knee S83.242A    Osteoarthritis of left hip M16.12    Left hip postoperative wound infection T81.49XA    Postoperative wound infection of left hip T81.49XA    Open wound of left hip S71.002A    Cellulitis of left thigh L03. 116    Prosthetic joint infection of left hip ([facility] T84.52XA           Current Facility-Administered Medications   Medication Dose Route Frequency    heparin (porcine) 100 unit/mL injection 500 Units  500 Units InterCATHeter Q8H PRN    rifAMPin (RIFADIN) capsule 300 mg  300 mg Oral Q12H    sodium chloride (NS) flush 5-40 mL  5-40 mL IntraVENous Q8H    sodium chloride (NS) flush 5-40 mL  5-40 mL IntraVENous PRN    fentaNYL citrate (PF) injection 50 mcg  50 mcg IntraVENous See Admin Instructions    0.9% sodium chloride infusion 250 mL  250 mL IntraVENous PRN    sodium chloride (NS) flush 5-40 mL  5-40 mL IntraVENous Q8H    sodium chloride (NS) flush 5-40 mL  5-40 mL IntraVENous PRN    sodium chloride (NS) flush 5-40 mL  5-40 mL IntraVENous Q8H    sodium chloride (NS) flush 5-40 mL  5-40 mL IntraVENous PRN    acetaminophen (TYLENOL) tablet 650 mg  650 mg Oral Q6H    naloxone (NARCAN) injection 0.4 mg  0.4 mg IntraVENous PRN    aspirin delayed-release tablet 81 mg  81 mg Oral BID    ferrous sulfate tablet 325 mg  1 Tab Oral TID    diphenhydrAMINE (BENADRYL) capsule 25 mg  25 mg Oral Q4H PRN    bisacodyl (DULCOLAX) suppository 10 mg  10 mg Rectal DAILY PRN    zolpidem (AMBIEN) tablet 5 mg  5 mg Oral QHS PRN    ceFAZolin (ANCEF) 3 g/20 mL in sterile water IV syringe  3 g IntraVENous Q8H    HYDROmorphone (DILAUDID) tablet 4-8 mg  4-8 mg Oral Q4H PRN    insulin lispro (HUMALOG) injection   SubCUTAneous AC&HS    glucose chewable tablet 16 g  4 Tab Oral PRN    glucagon (GLUCAGEN) injection 1 mg  1 mg IntraMUSCular PRN    dextrose (D50W) injection syrg 12.5-25 g  25-50 mL IntraVENous PRN    amLODIPine (NORVASC) tablet 2.5 mg  2.5 mg Oral QPM    amLODIPine (NORVASC) tablet 5 mg  5 mg Oral DAILY    atorvastatin (LIPITOR) tablet 20 mg  20 mg Oral QHS    carvedilol (COREG) tablet 12.5 mg  12.5 mg Oral BID WITH MEALS    pantoprazole (PROTONIX) tablet 40 mg  40 mg Oral DAILY    loratadine (CLARITIN) tablet 10 mg  10 mg Oral DAILY    gabapentin (NEURONTIN) capsule 400 mg  400 mg Oral TID    irbesartan (AVAPRO) tablet 300 mg  300 mg Oral DAILY    metaxalone (SKELAXIN) tablet 800 mg  800 mg Oral Q6H PRN    bacitracin 150,000 Units, polymyxin B 1,500,000 Units in lactated Ringers 3,000 mL Irrigation    PRN    bacitracin 100,000 Units, polymyxin B 1,000,000 Units in lactated Ringers 1,000 mL Irrigation    PRN    povidone-iodine 60 mL in lactated Ringers 1,000 mL Irrigation    PRN    vancomycin (VANCOCIN) 2000 mg in  ml infusion  2,000 mg IntraVENous Q12H    Vancomycin - Pharmacy to Dose  1 Each Other Rx Dosing/Monitoring           Review of Systems   Constitutional: Negative. HENT: Negative. Eyes: Negative. Respiratory: Negative. Cardiovascular: Negative. Gastrointestinal: Negative. Genitourinary: Negative. Musculoskeletal: Negative. Skin: Negative. Neurological: Negative. Endo/Heme/Allergies: Negative. Psychiatric/Behavioral: Negative.              Objective:     Visit Vitals  /79   Pulse 94   Temp 98 °F (36.7 °C)   Resp 16   Ht 5' 10\" (1.778 m)   Wt 135.3 kg (298 lb 5 oz)   SpO2 98%   BMI 42.80 kg/m²       Temp (24hrs), Av °F (36.7 °C), Min:97.6 °F (36.4 °C), Max:98.6 °F (37 °C)      GEN: pleasant well developed 63 y/o CM in no distress  HEENT: no scleral icterus  CHEST: no crackles or wheezes  CVS:regular, no murmurs  ABD: soft, nontender  EXT: no cyanosis, edema    Lab results     Chemistry  Recent Labs     19  0300 19  0230 19  1815   * 151* 139*    137 141   K 3.9 4.2 4.7    99* 105   CO2 30 31 28   BUN 12 12 15   CREA 0.78 0.97 1.03   CA 8.6 8.5 8.5   AGAP 8 7 8   BUCR 15 12 15       CBC w/ Diff  Recent Labs     19  0300 19  0230 19  1815   WBC  --  9.7  --    RBC  --  3.39*  --    HGB 8.5* 9.7*  9.8* 9.9*   HCT 26.6* 30.5*  30.8* 30.2*   PLT  --  405  --    GRANS  --  59  --    LYMPH  --  29  --    EOS  --  1  --        Microbiology  All Micro Results     Procedure Component Value Units Date/Time    CULTURE, Chang Sea STAIN [[id number]]  (Abnormal) Collected:  05/22/19 1553    Order Status:  Completed Specimen:  Wound from Hip Updated:  05/24/19 0923     Special Requests: NO SPECIAL REQUESTS        GRAM STAIN FEW WBC'S         NO ORGANISMS SEEN        Culture result: FEW STAPHYLOCOCCUS AUREUS       URINE C&S [[id number]] Collected:  05/22/19 1241    Order Status:  Completed Specimen:  Urine from Clean catch Updated:  05/24/19 0914     Special Requests: NO SPECIAL REQUESTS        Culture result: NO GROWTH 2 DAYS       CULTURE, ANAEROBIC [[id number]] Collected:  05/22/19 1553    Order Status:  Completed Specimen:  Hip Updated:  05/24/19 0704     Special Requests: NO SPECIAL REQUESTS        Culture result:       NO ANAEROBES ISOLATED 2 DAYS          MRSA SCREEN - PCR (NASAL) [[id number]] Collected:  05/22/19 1325    Order Status:  Completed Specimen:  Nasal from Nares Updated:  05/22/19 2100     Special Requests: NO SPECIAL REQUESTS        Culture result:       MRSA target DNA is not detected (presumptive not colonized with MRSA)          MRSA SCREEN - PCR (NASAL) [[id number]]     Order Status:  Canceled Specimen:  Nasal from Nares     URINE C&S [[id number]]     Order Status:  Canceled Specimen:  Urine from 1125 Sintia Pappas MD, Gavi Stone  Infectious Disease Specialist  Pager [phone]

## 2019-05-24 NOTE — PROGRESS NOTES
Problem: Falls - Risk of  Goal: *Absence of Falls  Description  Document Shonna Latin Fall Risk and appropriate interventions in the flowsheet.   Outcome: Progressing Towards Goal     Problem: Patient Education: Go to Patient Education Activity  Goal: Patient/Family Education  Outcome: Progressing Towards Goal     Problem: Pain  Goal: *Control of Pain  Outcome: Progressing Towards Goal     Problem: Patient Education: Go to Patient Education Activity  Goal: Patient/Family Education  Outcome: Progressing Towards Goal     Problem: Patient Education: Go to Patient Education Activity  Goal: Patient/Family Education  Outcome: Progressing Towards Goal     Problem: Hip Replacement: Post Op Day 1  Goal: Activity/Safety  Outcome: Progressing Towards Goal  Goal: Diagnostic Test/Procedures  Outcome: Progressing Towards Goal  Goal: Nutrition/Diet  Outcome: Progressing Towards Goal  Goal: Medications  Outcome: Progressing Towards Goal  Goal: Respiratory  Outcome: Progressing Towards Goal  Goal: Treatments/Interventions/Procedures  Outcome: Progressing Towards Goal  Goal: Psychosocial  Outcome: Progressing Towards Goal  Goal: Discharge Planning  Outcome: Progressing Towards Goal  Goal: *Demonstrates progressive activity  Outcome: Progressing Towards Goal  Goal: *Optimal pain control at patient's stated goal  Outcome: Progressing Towards Goal  Goal: *Hemodynamically stable  Outcome: Progressing Towards Goal  Goal: *Discharge plan identified  Outcome: Progressing Towards Goal

## 2019-05-24 NOTE — PROGRESS NOTES
8820  Bedside and Verbal shift change report given to Freddie Delatorre RN by GERARD Long RN. Report included the following information SBAR, Kardex, OR Summary, Intake/Output and MAR.     0825  Assumed care at this time. Assessment completed in flowsheet. Pt is alert and oriented x4. Denies SOB and chest pain. Pt lungs clear bilaterally. Capillary refill less than 3 seconds. Pt denies numbness and tingling to all extremities. Stated pain  5/10. Pt has PICC to the RT FA. Pt has ABD and 4x4 dressing with foam. SCDs and TEDs applied bilaterally. Pt encouraged to continue use of IS, Pt verbalized understanding. Ice pack applied. Call light and possessions within reach. Bed is in the lowest position. Will continue to monitor.     Yellow Fall Risk armband is currently on RT arm.      1434  Dual AVS with CaseyRN    1280  AVS review with pt, opportunity for questions and concerns

## 2019-05-24 NOTE — DISCHARGE SUMMARY
DISCHARGE SUMMARY    Patient: Liliana Chavez MRN: [id number]  CSN: [id number]    YOB: 1960  Age: 64 y.o. Sex: male              Admit Date: 5/22/2019    Discharge Date:     Admission Diagnoses: Postoperative wound infection of left hip [T81.49XA]  Periprosthetic fracture around internal prosthetic hip joint [M97. [id number], [address]    Discharge Diagnoses:    Problem List as of 5/24/2019 Date Reviewed: 5/23/2019          Codes Class Noted - Resolved    * (Principal) Prosthetic joint infection of left hip ([address] (Chronic) ICD-10-CM: Y35.07OB  ICD-9-CM: 996.66  5/22/2019 - Present        Cellulitis of left thigh ICD-10-CM: L03.116  ICD-9-CM: 682.6  5/20/2019 - Present        Open wound of left hip ICD-10-CM: S71.002A  ICD-9-CM: 890.0  5/1/2019 - Present        Postoperative wound infection of left hip ICD-10-CM: T81.49XA  ICD-9-CM: 998.59  3/13/2019 - Present        Left hip postoperative wound infection ICD-10-CM: T81.49XA  ICD-9-CM: 998.59  3/12/2019 - Present        Osteoarthritis of left hip (Chronic) ICD-10-CM: M16.12  ICD-9-CM: 715.95  2/8/2019 - Present        Acute medial meniscus tear of left knee ICD-10-CM: M83.792G  ICD-9-CM: 836.0  12/21/2017 - Present        High cholesterol (Chronic) ICD-10-CM: E78.00  ICD-9-CM: 272.0  12/18/2017 - Present        HTN (hypertension) (Chronic) ICD-10-CM: I10  ICD-9-CM: 401.9  12/18/2017 - Present        Tear of medial meniscus of left knee, current (Chronic) ICD-10-CM: K78.015E  ICD-9-CM: 836.0  12/18/2017 - Present              Discharge Condition: Stable    Hospital Course: On the day of admission the patient underwent an I and D of a Left Total Hip Arthroplasty that was done under general anesthesia without complications. The patient was started on ASA 81 MG BID postoperatively for anti embolism prophylaxis. The patient was voiding spontaneously without need for a Banerjee catheter.  The patient was also started preoperatively on Ancef as well as vancomycin for the treatment of the infection. Infectious disease was consulted as well. Physical Therapy was begun postoperatively with patient weight bearing as tolerated. On POD # 1 patient's dressing was changed with some bloody discharge noted. Patient was able to wiggle their toes and ankle with sensation intact to light touch. The patient had no edema with a present 2+ posterior tibialis pulse. Patient's leg lengths appear equal. On POD #2 patient's physical exam was unchanged. Patient's incision was examined and looked good without signs or symptoms of infection. Patient's dressing was reapplied. Cultures grew our staph aureus and the patient is to continue with ancef and Rifampin outpatient as per ID. He is to have wound care follow up next week at [facility]. He was stable from ortho perspective for discharge and should follow up in ten days with Dr. Darylene Reusing. Patients hemoglobins were   Recent Labs     05/24/19  0300 05/23/19  0230 05/22/19  1815   HGB 8.5* 9.7*  9.8* 9.9*   . Patient temp max was. Temp (72hrs), Av.3 °F (36.8 °C), Min:97.5 °F (36.4 °C), Max:99.8 °F (37.7 °C)  . Discharge Medications:     Current Discharge Medication List      START taking these medications    Details   ceFAZolin 2 g IV syringe 2 g by IntraVENous route every eight (8) hours for 40 days. Qty: 126 Dose, Refills: 0      rifAMPin (RIFADIN) 150 mg capsule Take 2 Caps by mouth Before breakfast and dinner. Qty: 84 Cap, Refills: 0      aspirin 81 mg chewable tablet Take 1 Tab by mouth two (2) times a day. Qty: 42 Tab, Refills: 0      naloxone (NARCAN) 4 mg/actuation nasal spray Use 1 spray intranasally into 1 nostril. Use a new Narcan nasal spray for subsequent doses and administer into alternating nostrils. May repeat every 2 to 3 minutes as needed.   Qty: 1 Each, Refills: 0         CONTINUE these medications which have CHANGED    Details   HYDROmorphone (DILAUDID) 4 mg tablet Take 1-2 Tabs by mouth every four (4) hours as needed for Pain for up to 5 days. Max Daily Amount: 48 mg. Qty: 60 Tab, Refills: 0    Associated Diagnoses: Infection associated with internal left hip prosthesis, initial encounter ([address]         CONTINUE these medications which have NOT CHANGED    Details   gabapentin (NEURONTIN) 400 mg capsule Take 400 mg by mouth three (3) times daily. Indications: for the hip      !! amLODIPine (NORVASC) 5 mg tablet Take 5 mg by mouth daily (with breakfast). Indications: high blood pressure      dulaglutide (TRULICITY) 1.5 MO/2.7 mL sub-q pen 1.5 mg by SubCUTAneous route Every Saturday. fexofenadine (ALLEGRA) 180 mg tablet Take 180 mg by mouth daily. metFORMIN (GLUCOPHAGE) 1,000 mg tablet Take 1,000 mg by mouth two (2) times daily (with meals). ergocalciferol (ERGOCALCIFEROL) 50,000 unit capsule Take 50,000 Units by mouth every Monday. irbesartan (AVAPRO) 150 mg tablet Take 150 mg by mouth two (2) times a day. atorvastatin (LIPITOR) 20 mg tablet Take 20 mg by mouth nightly. Monday, Tuesday, Wednesday, Thursday, Friday       metaxalone (SKELAXIN) 800 mg tablet Take 800 mg by mouth every six (6) hours as needed for Pain. !! amLODIPine (NORVASC) 2.5 mg tablet Take 2.5 mg by mouth every evening. carvedilol (COREG) 12.5 mg tablet Take 12.5 mg by mouth two (2) times a day. esomeprazole (NEXIUM) 20 mg capsule Take 20 mg by mouth daily. !! - Potential duplicate medications found. Please discuss with provider. STOP taking these medications       levoFLOXacin (LEVAQUIN) 750 mg tablet Comments:   Reason for Stopping:               Activity: PT/OT Eval and Treat  Patient has no hip precautions. Diet: Diabetic Diet    Wound Care: Keep wound clean and dry. Patient to see wound care next week for further evaluation.     Follow-up: Two weeks post-op in the office for X-rays of the Left hip and another clinical exam.

## 2019-05-24 NOTE — PROGRESS NOTES
Problem: Falls - Risk of  Goal: *Absence of Falls  Description  Document Bishop Hernandez Fall Risk and appropriate interventions in the flowsheet.   Outcome: Progressing Towards Goal     Problem: Patient Education: Go to Patient Education Activity  Goal: Patient/Family Education  Outcome: Progressing Towards Goal     Problem: Pain  Goal: *Control of Pain  Outcome: Progressing Towards Goal     Problem: Patient Education: Go to Patient Education Activity  Goal: Patient/Family Education  Outcome: Progressing Towards Goal     Problem: Patient Education: Go to Patient Education Activity  Goal: Patient/Family Education  Outcome: Progressing Towards Goal     Problem: Patient Education: Go to Patient Education Activity  Goal: Patient/Family Education  Outcome: Progressing Towards Goal     Problem: Hip Replacement: Post Op Day 1  Goal: Off Pathway (Use only if patient is Off Pathway)  Outcome: Progressing Towards Goal  Goal: Activity/Safety  Outcome: Progressing Towards Goal  Goal: Nutrition/Diet  Outcome: Progressing Towards Goal  Goal: Medications  Outcome: Progressing Towards Goal  Goal: Treatments/Interventions/Procedures  Outcome: Progressing Towards Goal  Goal: Psychosocial  Outcome: Progressing Towards Goal  Goal: Discharge Planning  Outcome: Progressing Towards Goal  Goal: *Demonstrates progressive activity  Outcome: Progressing Towards Goal  Goal: *Optimal pain control at patient's stated goal  Outcome: Progressing Towards Goal  Goal: *Hemodynamically stable  Outcome: Progressing Towards Goal  Goal: *Discharge plan identified  Outcome: Progressing Towards Goal

## 2019-05-24 NOTE — PROGRESS NOTES
1940 - Assumed care at this time. Pain rated 7/10, pain medication administered per MAR. Pt resting quietly in bed, wife at bedside. No signs of distress. Will continue to monitor. Pt encouraged to call for assistance. 2142 - Patient in bed at this time. Patient A&Ox4, RA. Denies chest pain and SOB. 18G IV to left FA, PICC to right arm intact and patent. SCD compression device bilaterally. Foam tape dressing to left hip CDI. Denies numbness/tingling/calf pain. Pain 4/10 with a tolerable level of 5/10. Pt educated on IS use, q2h rounds, pain management, CHG wipes and \"Up for Meals\". Pt verbalized understanding, no concerns voiced. Call bell within reach, bed in lowest position. Pt encouraged to call for assistance.    0702 - Patient rated pain 5/10, pain medication administered per [facility]. No other concerns voiced at this time. Pt to self perform CHG wipes. Call bell within reach, bed in lowest position. Pt encouraged to use call bell for any needs. 0523 - Breakthrough drainage, dressing changed. 4x4/ABD/foam tape.

## 2019-05-24 NOTE — WOUND CARE
Wound Care Progress Note     New consult placed by Padmini Corley PA-C for \"left hip infection, eval and treat\"    Assessment:   Communication: A&O x 4 communicative   Continent  Independently repositions  Diet: diabetic   Patient reports no pain. Bilateral heel, buttocks, and sacral skin intact and without erythema. 1. Surgical wound, approximated with sutures, superior aspect with small opening for drainage (wound location & etiology) = Moderate exudate noted. Periwound intact & with mild blanchable erythema. Treatment: Cleansed using normal saline and sterile gauze. Periwound prepped with Allkare skin prep, Bacitracin to incision line, aquacell ag to wound, exudry for absorption and secured with transparent drape. Wound Recommendations/ Home Care Recommendations:    Cleanse with normal saline and sterile gauze. Prep periwound with Allkare skin prep, Bacitracin to incision line, aquacell ag to wound, exudry for absorption and secure with transparent drape. No substitutions recommended. Skin Care & Pressure Relief Recommendations:  Minimize layers of linen/pads under patient to optimize support surface. Turn/reposition approximately every 2 hours and offload heels pillows or Prevalon boots.   Manage incontinence / promote continence; Proshield to buttocks and sacrum daily and as needed with incontinence care    Treatment and assessment per wound care eval and treat order    Transition of Care: Plan to follow weekly and per product recommendations while admitted to hospital.

## 2019-05-24 NOTE — ROUTINE PROCESS
Bedside and Verbal shift change report given to HEATHER Zarate RN by Philip Wallace RN. Report included the following information SBAR, Kardex, OR Summary, Intake/Output and MAR.

## 2019-05-24 NOTE — PROGRESS NOTES
Plan: Raymundo  to teach IV abx administration, will meet pt at home prior to 11pm dose, aware daughter picking pt up at 2:30p after his 3pm dose. HH will contact pt to coordinate time. Pt declines PT, will follow up for outpt PT when appropriate, MD office will assist. Wound clinic following for wound care to hip, CMS will contact pt Tuesday with ID follow up appointment, pt aware of plan and verbalizes agreement. RN aware of plan. 1250- per Deana Retana, formerly [facility] liaison, Nurse is \"on her way\" to [facility] to teach pt. She is aware plan is for 2:30 discharge. Pt aware as well. Pablito Carpio from [facility] in pt room to instruct on IV abx administration. Infusion dept will coordinate with pt for delivery of medication and supplies to home. Spoke with liaison with [facility]- they are able to assist with IV abx and nursing if pt discharges today vs tomorrow. Once final abx determined, they would be able to hopefully, depending on time, hook pt up to home IV pump and instruct pt and wife prior to leaving. They are also able to assist if pt discharges home today. Noted dc order for today, and current IV abx of Ancef 3gm q8h on 7/3/11p schedule. Provided MD with phone number to [facility] [phone] opt 2, as given by formerly [facility] liaison. Pt aware and agrees.

## 2019-05-24 NOTE — DISCHARGE INSTRUCTIONS
Margaux Weston III, MD Scharlene Perking, PA-C    Lower Extremity Surgery  Discharge Instructions      Please take the time to review the following instructions before you leave the hospital and use them as guidelines during your recovery from surgery. If you have any questions you may contact my office at [phone]. Wound Care/Dressing Changes:    [x]   You may change your dressing as needed. Please have follow up with the wound care clinic as well. Do not apply antibiotic ointment to your incisions. Showering/Bathing:    [x]   You may shower 2 days after surgery. Please keep the incision clean and dry at all times. []   Do not get the dressing wet. Once you remove it two days from surgery, you may get the incision wet if there is no drainage. Weight Bearing Status/Braces/Activity:    []   If you have had a total knee replacement you may weight bear as tolerated with the knee immobilizer in place. Use crutches, cane, or walker as needed. You should sleep in your knee immobilizer. You need to begin working on range of motion early after your surgery. It is very important to work on extension (straighthening) the knee. You will be advanced with walking and range of motion by physical therapy at home. [x]   If you have had a total hip replacement you may weight bear as tolerated. Physical therapy with come by your house to help you perform exercises and work on strength and range of motion. Ice/Elevation:    Continue ice and elevation consistently for 48 hours after surgery. If you have had a total knee replacement when elevating your knee elevate it on about 4 pillows to be sure it is above your heart. After 48 hours, you should ice your knee 3 times per day, for 20 minutes at a time for the next 5 days. After one week from surgery, you may use ice and elevation as needed for pain and swelling. Diet:    You may advance to your regular diet as tolerated. Medication:    1. You will be given a prescription for pain medications when you are discharged from the hospital.  Take the medication as needed according to the directions on the prescription bottle. Possible side effects of the medication include dizziness, headache, nausea, vomiting, constipation and urinary retention. If you experience any of these side effects call the office so that we can assist you in relieving them. Discontinue the use of the pain medication if you develop itching, rash, shortness of breath or difficulties swallowing. If these symptoms become severe or aren't relieved by discontinuing the medication you should seek immediate medical attention. Refills of pain medication are authorized during office hours only (8AM - 5PM Mon thru Fri). 2. If you were prescribed Percocet/oxycodone or Dilaudid/hydromorphone you must have a written prescription. These medications legally cannot be called in to a pharmacy. 3. Do not take Tylenol in addition to your pain medication as most of the pain medication already contains Tylenol. Exceptions include Dilaudid/hydromorphone, Demerol/meperidine and roxicodone. Do not exceed 3000 mg of Tylenol per day. Ex:  (hydrocodone 5/325mg = 325 mg of Tylenol)  4. You should use Aspirin 81 mg twice daily for 21 days from the date of your surgery. This will help to prevent blood clots from forming in your legs. This needs to be started the day after your surgery and home healthcare will teach you how this is done. If you are taking another medication such as Xarelto as discussed with Dr. Tarah Stone this should also be started the day after the surgery. 5. You may resume the medications you were taking prior to your surgery, unless otherwise specified in your discharge instructions. Pain medication may change the effects of any antidepressant medication.   If you have any questions about possible interactions between your regular medications and the pain medication you should consult the physician who prescribes your regular medications. 6. Please continue your antibiotics IV as written by infectious disease. Also you should call infectious disease office for follow up appointment. Follow Up Appointment:    If you are unsure of your follow-up appointment date and time, please call [phone]. Please let our  know you are scheduling a post-op appointment. Most appointments should be between 7-14 days after your surgery. Physical Therapy:    [x]   Physical therapy will be discussed with you at your first follow-up appointment with Dr. Mireya Yi. You don't need to begin physical therapy prior to that visit. You are to participate with [address] [place] [address] as arranged pre-operatively in the convience of your own home. Important signs and symptoms:    If any of the following signs and symptoms occurs, you should contact Dr. Mireya Yi' office. Please be advised if a problem arises which you feel required immediate medical attention or your are unable to contact Dr. Miryea Yi' office you should seek immediate medical attention. Signs and symptoms to watch for include:  1. A sudden increase in swelling and/or redness or warmth at the area your surgery was performed which isn't relieved by rest, ice and elevation. 2. Oral temperature greater than 101.5 degrees for 12 hours or more which isn't relieved by an increase in fluid intake and taking two Tylenol every 4-6 hours. Do not exceed 3000 mg of Tylenol per day. 3. Excessive drainage from your incisions or drainage that hasn't stopped by 72 hours. 4. Calf pian, tenderness, redness or swelling which isn't relieved with rest and elevation. 5. Fever, chills, shortness of breath, chest pain, nausea, vomiting or other signs and symptoms which are of concern to you.

## 2019-05-25 LAB
BACTERIA SPEC CULT: ABNORMAL
GRAM STN SPEC: ABNORMAL
GRAM STN SPEC: ABNORMAL
SERVICE CMNT-IMP: ABNORMAL

## 2019-05-26 LAB
ABO + RH BLD: NORMAL
BLD PROD TYP BPU: NORMAL
BLD PROD TYP BPU: NORMAL
BLOOD GROUP ANTIBODIES SERPL: NORMAL
BPU ID: NORMAL
BPU ID: NORMAL
CALLED TO:,BCALL1: NORMAL
CROSSMATCH RESULT,%XM: NORMAL
CROSSMATCH RESULT,%XM: NORMAL
SPECIMEN EXP DATE BLD: NORMAL
STATUS OF UNIT,%ST: NORMAL
STATUS OF UNIT,%ST: NORMAL
UNIT DIVISION, %UDIV: 0
UNIT DIVISION, %UDIV: 0

## 2019-05-27 LAB
BACTERIA SPEC CULT: NORMAL
SERVICE CMNT-IMP: NORMAL

## 2019-05-30 ENCOUNTER — HOSPITAL ENCOUNTER (OUTPATIENT)
Dept: WOUND CARE | Age: 59
Discharge: HOME | End: 2019-05-30
Attending: FAMILY MEDICINE
Payer: COMMERCIAL

## 2019-05-30 VITALS
RESPIRATION RATE: 18 BRPM | HEART RATE: 95 BPM | SYSTOLIC BLOOD PRESSURE: 168 MMHG | TEMPERATURE: 98.1 F | OXYGEN SATURATION: 96 % | DIASTOLIC BLOOD PRESSURE: 89 MMHG

## 2019-05-30 PROCEDURE — 99214 OFFICE O/P EST MOD 30 MIN: CPT

## 2019-05-30 NOTE — PROGRESS NOTES
Returned patient call 5/29/2019 around 11:30 AM.  He reported nausea with Cefazolin infusions requiring zofran and is requesting prescription for zofran. I called [facility] [phone] to change Cefazolin to Ceftriaxone 2 gm IV once daily and called in prescription to his pharmacy [address] for  4mg Zofran q 8 h prn. He will come in for follow up next week.     Young Puga MD, [address] [facility]  Infectious Diseases  [phone]

## 2019-05-30 NOTE — PROGRESS NOTES
Patient presents to wound clinic for: Nataliya Arenas is a 64 y.o. male whom presents post op follow up of his left hip. Pt had an open wound and cellulitis. CT scan of the hip was abnormal and I referred the patient to Dr Jorge Yancey his orthopedist. He was admitted and scheduled for open drainage of an abscess. The abscess apparently extended to the hardware and some of this was replaced. Since then he has been started on IV ABX and has a PICC line. Today the patient complains of continued drainage from the suture line. Review of Systems:    Gen: No fever, chills, malaise, weight loss/gain. GI: No nausea, vomiting, diarrhea, constipation, melena or hematochezia. Derm: see below  Musc/skeletal: no bone or joint complains. Vasc: No edema, cyanosis or claudication. Exam:   Visit Vitals  /89 (BP 1 Location: Right arm, BP Patient Position: Sitting)   Pulse 95   Temp 98.1 °F (36.7 °C)   Resp 18   SpO2 96%     Gen: Pleasant obese middle aged male in NAD. Neuro: A+OX4. Motor grossly intact. Wound Description:      05/30/19 1631   Wound Hip Left sutures intact 05/30/19   Date First Assessed/Time First Assessed: 05/30/19 1623   Present on Hospital Admission: Yes  Wound Approximate Age at First Assessment (Weeks): 14 weeks  Primary Wound Type: Open incision/surgical site  Location: Hip  Wound Location Orientation: Left . ..    Dressing Type Absorptive   Incision Site Well Approximated Yes   Non-staged Wound Description Full thickness   Wound Length (cm) 5 cm   Wound Width (cm) 1.5 cm   Wound Depth (cm) 2 cm   Wound Volume (cm^3) 15 cm^3   Condition of Base Granulation   Condition of Edges Closed   Assessment Drainage   Tissue Type Percent Pink 10   Tissue Type Percent Red 90   Tunneling (cm) 2 cm   Direction of Tunnel 12 o'clock   Drainage Color Sanguinous   Wound Odor None   Aurelia-wound Assessment Blanchable erythema   Margins Attached edges       Debridement: not required     Infection: Yes  Type: none  Signs and Symptoms: drainage from wound  Recent Would Culture results: see chart. Antibiotic:    Typical:continue      Assessment:    Patient Active Problem List   Diagnosis Code    High cholesterol E78.00    HTN (hypertension) I10    Tear of medial meniscus of left knee, current S83.242A    Acute medial meniscus tear of left knee S83.242A    Osteoarthritis of left hip M16.12    Left hip postoperative wound infection T81.49XA    Postoperative wound infection of left hip T81.49XA    Open wound of left hip S71.002A    Cellulitis of left thigh L03. 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 He doesn't have any signs of active infection. However, he clearly has a seroma that will continue to drain. Plan: 1. Once sutures are removed, the patient needs a wound vac applied. 2. Continue IV Abx per ID. Will follow. 3. RTC in 1 week.         Bridgette Tran MD

## 2019-05-30 NOTE — DISCHARGE INSTRUCTIONS
WOUND CARE, DRESSING CHANGE ONE TIME, Status: Canceled     Comments: In wound care clinic today:   Cleanse wound with NS or soap and water or commercial wound cleanser   Apply the following topically to wound bed: Isabel   Apply the following to refugio-wound: NA   Apply the following dressings: Absorptive dressing     For Home Care/Self Care:   Cleanse wound with NS or soap and water or commercial wound cleanser   Keep dressing dry and intact when bathing   Apply the following to wound bed: Isabel   Apply the following to skin around wound: NA   Apply the following dressings: Absorptive dressing     Leave dressings in place until next visit     Patient to return for wound care in: 7  Days   Follow up with Nurse visit as recommended. PLEASE CONTACT OFFICE AS SOON AS POSSIBLE IF UNABLE TO MAKE THIS APPOINTMENT. Inspect your wounds, looking for signs of infection which may include the following:  Increase in redness  Red \"streaks\" from wound  Increase in swelling  Fever  Unusual odor  Change in the amount of wound drainage. Should you experience any significant changes in your wound(s) or have any questions regarding your home care instructions please contact the wound center or your home health company. If after regular business hours, please call your family doctor or local emergency room.     05/01/19 4675

## 2019-05-30 NOTE — WOUND CARE
05/30/19 1631   Wound Hip Left sutures intact 05/30/19   Date First Assessed/Time First Assessed: 05/30/19 1623   Present on Hospital Admission: Yes  Wound Approximate Age at First Assessment (Weeks): 14 weeks  Primary Wound Type: Open incision/surgical site  Location: Hip  Wound Location Orientation: Left . ..    Dressing Type Absorptive   Incision Site Well Approximated Yes   Non-staged Wound Description Full thickness   Wound Length (cm) 5 cm   Wound Width (cm) 1.5 cm   Wound Depth (cm) 2 cm   Wound Volume (cm^3) 15 cm^3   Condition of Base Granulation   Condition of Edges Closed   Assessment Drainage   Tissue Type Percent Pink 10   Tissue Type Percent Red 90   Tunneling (cm) 2 cm   Direction of Tunnel 12 o'clock   Drainage Color Sanguinous   Wound Odor None   Aurelia-wound Assessment Blanchable erythema   Margins Attached edges   Cleansing and Cleansing Agents  Normal saline   Dressing Changed Changed/New   Dressing Type Applied Absorptive   Procedure Tolerated Well

## 2019-06-03 ENCOUNTER — HOSPITAL ENCOUNTER (OUTPATIENT)
Dept: WOUND CARE | Age: 59
Discharge: HOME | End: 2019-06-03
Attending: FAMILY MEDICINE
Payer: COMMERCIAL

## 2019-06-03 VITALS
TEMPERATURE: 97.9 F | DIASTOLIC BLOOD PRESSURE: 93 MMHG | SYSTOLIC BLOOD PRESSURE: 158 MMHG | HEART RATE: 87 BPM | RESPIRATION RATE: 17 BRPM | OXYGEN SATURATION: 97 %

## 2019-06-03 PROBLEM — L03.116 CELLULITIS OF LEFT THIGH: Status: RESOLVED | Noted: 2019-05-20 | Resolved: 2019-06-03

## 2019-06-03 PROBLEM — S83.242A TEAR OF MEDIAL MENISCUS OF LEFT KNEE, CURRENT: Chronic | Status: RESOLVED | Noted: 2017-12-18 | Resolved: 2019-06-03

## 2019-06-03 PROBLEM — S83.242A ACUTE MEDIAL MENISCUS TEAR OF LEFT KNEE: Status: RESOLVED | Noted: 2017-12-21 | Resolved: 2019-06-03

## 2019-06-03 PROBLEM — T81.49XA LEFT HIP POSTOPERATIVE WOUND INFECTION: Status: RESOLVED | Noted: 2019-03-12 | Resolved: 2019-06-03

## 2019-06-03 PROCEDURE — 99214 OFFICE O/P EST MOD 30 MIN: CPT

## 2019-06-03 NOTE — PROGRESS NOTES
Patient presents to wound clinic for: Ricardo Wheatley is a 64 y.o. male whom presents follow up of his left post op wound. He states he continues to have a lot of drainage. He denies much pain. He denies fevers. There is no pus draining that he has seen. He states that his ortho doc said that I could take the sutures out when I was ready to do so. Review of Systems:    Gen: No fever, chills, malaise, weight loss/gain. GI: No nausea, vomiting, diarrhea, constipation, melena or hematochezia. Derm: see below  Musc/skeletal: no bone or joint complains. Vasc: No edema, cyanosis or claudication. Exam:   Visit Vitals  BP (!) 158/93 (BP 1 Location: Left arm, BP Patient Position: Sitting)   Pulse 87   Temp 97.9 °F (36.6 °C)   Resp 17   SpO2 97%     Gen: Obese, middle age, in better spirits. Neuro: A+OX4. Gross motor intact. Wound Description:      06/03/19 1542   Wound Hip Left sutures intact 05/30/19   Date First Assessed/Time First Assessed: 05/30/19 1623   Present on Hospital Admission: Yes  Wound Approximate Age at First Assessment (Weeks): 14 weeks  Primary Wound Type: Open incision/surgical site  Location: Hip  Wound Location Orientation: Left . .. Dressing Status Breakthrough drainage; Saturated   Dressing Type Absorptive   Incision Site Well Approximated No   Non-staged Wound Description Full thickness   Wound Length (cm) 0.3 cm   Wound Width (cm) 0.3 cm   Wound Depth (cm) 5.5 cm   Wound Volume (cm^3) 0.5 cm^3   Condition of Base Adipose exposed   Condition of Edges Rolled/curled; Open   Assessment Drainage;Painful;Swelling   Drainage Color Serosanguinous   Wound Odor None   Aurelia-wound Assessment Blanchable erythema; Induration;Painful;Swelling   Margins Defined edges       Debridement: not required     Infection: Yes      Assessment:    Patient Active Problem List   Diagnosis Code    High cholesterol E78.00    HTN (hypertension) I10    Osteoarthritis of left hip M16.12    Postoperative wound infection of left hip T81.49XA    Open wound of left hip S71.002A    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 There seems to be more tunneling today. We are still awaiting [facility] authorization for his wound vacuum. We will contact them and push this along. Plan: 1. Wound vacuum. We need to get this done ASAP as he continues to have a lot of serous drainage. 2. Post operative infection. He is on iv abx with a PICC line in place. We follow. 3. Sutures removed today.     Fidelia Mendoza MD

## 2019-06-03 NOTE — WOUND CARE
06/03/19 1542   Wound Hip Left sutures intact 05/30/19   Date First Assessed/Time First Assessed: 05/30/19 1623   Present on Hospital Admission: Yes  Wound Approximate Age at First Assessment (Weeks): 14 weeks  Primary Wound Type: Open incision/surgical site  Location: Hip  Wound Location Orientation: Left . .. Dressing Status Breakthrough drainage; Saturated   Dressing Type Absorptive   Incision Site Well Approximated No   Non-staged Wound Description Full thickness   Wound Length (cm) 0.3 cm   Wound Width (cm) 0.3 cm   Wound Depth (cm) 5.5 cm   Wound Volume (cm^3) 0.5 cm^3   Condition of Base Adipose exposed   Condition of Edges Rolled/curled; Open   Assessment Drainage;Painful;Swelling   Drainage Color Serosanguinous   Wound Odor None   Aurelia-wound Assessment Blanchable erythema; Induration;Painful;Swelling   Margins Defined edges   Cleansing and Cleansing Agents  Soap and water  (vashe)   Dressing Changed Changed/New   Dressing Type Applied Absorptive  (acticote 7 wick 10cm long, 4x4,mextra, drape,hypafix)   Procedure Tolerated Well

## 2019-06-05 ENCOUNTER — HOSPITAL ENCOUNTER (OUTPATIENT)
Dept: WOUND CARE | Age: 59
Discharge: HOME | End: 2019-06-05
Attending: FAMILY MEDICINE
Payer: COMMERCIAL

## 2019-06-05 ENCOUNTER — HOSPITAL ENCOUNTER (OUTPATIENT)
Dept: WOUND CARE | Age: 59
Discharge: HOME | End: 2019-06-05
Attending: INTERNAL MEDICINE
Payer: COMMERCIAL

## 2019-06-05 VITALS
DIASTOLIC BLOOD PRESSURE: 72 MMHG | HEART RATE: 91 BPM | OXYGEN SATURATION: 100 % | TEMPERATURE: 98.2 F | RESPIRATION RATE: 18 BRPM | SYSTOLIC BLOOD PRESSURE: 141 MMHG

## 2019-06-05 PROCEDURE — 97605 NEG PRS WND THER DME<=50SQCM: CPT

## 2019-06-05 PROCEDURE — 99211 OFF/OP EST MAY X REQ PHY/QHP: CPT

## 2019-06-05 NOTE — WOUND CARE
06/05/19 1605 Wound Hip Left sutures intact 05/30/19 Date First Assessed/Time First Assessed: 05/30/19 1623   Present on Hospital Admission: Yes  Wound Approximate Age at First Assessment (Weeks): 14 weeks  Primary Wound Type: Open incision/surgical site  Location: Hip  Wound Location Orientation: Left . .. Dressing Status Breakthrough drainage Dressing Type Absorptive Incision Site Well Approximated No  
Non-staged Wound Description Full thickness Wound Length (cm) 4 cm Wound Width (cm) 0.8 cm Wound Depth (cm) 6.2 cm Wound Volume (cm^3) 19.84 cm^3 Condition of Base Adipose exposed;Slough Condition of Edges Rolled/curled; Open Assessment Drainage;Painful;Swelling Tunneling (cm) 4 cm Direction of Tunnel 9 o'clock Drainage Color Serosanguinous Wound Odor None Aurelia-wound Assessment Blanchable erythema; Induration Margins Defined edges Cleansing and Cleansing Agents  Soap and water (vashe) Dressing Changed Changed/New Dressing Type Applied Negative pressure wound therapy Procedure Tolerated Well

## 2019-06-05 NOTE — PROGRESS NOTES
Infectious Disease Follow-up Note      Date of Admission: 6/5/2019     Date of Note:  6/5/2019      Summary:      63 y/o CM Obese, HTN, Hyperchol, DM w/ L hip PJI s/p one stage exchange 5/22. Cx MSSA. Prior wd infection MSSA, CONS. Discharged 5/24 on Cefazolin/Vanco - nausea with Cefazolin changed to Ceftriaxone 5/29 - improved nausea but not resolved with abx change, Zofran     Interval History:      Says he still has nausea around 1 hour after Ceftriaxone infusion but much less than with Cefazolin. Still on Vancomycin. Continues to have profuse serosanguinous drainage from wound. No fever. No problems with PICC. Had diarrhea yesterday 6 x but only one formed stool so far today     Current Antimicrobials: Prior Antimicrobials   Ceftriaxone 5/29  - 7  Rifampin 5/24 - 12  Cefazolin 5/23 - 6 Vancomycin 5/22 - 14       Assessment / Plan:      Prosthetic Joint Infection L hip  -  L direct ant KATHERINE 2/13/2019   - subsequent superficial wd infection I&D 3/13. Cx MSSA broth only, rare CONS.    - MRSA screen January, 5/22 negative.    - s/p 5/22 L hip I&D of hip replacement, periprosthetic infection,revision L hip replacement (new liner, femoral head). (one stage exchange)  - Cx few Staphylococcus aureus.  (most likely MSSA again) -> dc Vancomycin (called to [facility] [phone])  -> continue Ceftriaxone, rifampin until 7/3/2019, then po suppression with Keflex, Rifampin x 3-6 months  -> CBC, BMP, ESR, CRP in weeks, followup 3 weeks   Nausea  - from Cefazolin, Ceftriaxone -> change zofran to phenergan 25 mg q 4-6h prn per patient request (called to [facility])   Obesity     DM     HTN     Hypercholesterolemia     GERD     DJD        Microbiology:   5/22      Ed cx possible Staph spp               MRSA scrn neg     Lines / Catheters:   R PICC       Patient Active Problem List   Diagnosis Code    High cholesterol E78.00    HTN (hypertension) I10    Osteoarthritis of left hip M16.12    Postoperative wound infection of left hip T81.49XA    Open wound of left hip S71.002A    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 126 Dose    rifAMPin (RIFADIN) 150 mg capsule Take 2 Caps by mouth Before breakfast and dinner. 84 Cap    aspirin 81 mg chewable tablet Take 1 Tab by mouth two (2) times a day. 42 Tab    naloxone (NARCAN) 4 mg/actuation nasal spray Use 1 spray intranasally into 1 nostril. Use a new Narcan nasal spray for subsequent doses and administer into alternating nostrils. May repeat every 2 to 3 minutes as needed. 1 Each    gabapentin (NEURONTIN) 400 mg capsule Take 400 mg by mouth three (3) times daily. Indications: for the hip     amLODIPine (NORVASC) 5 mg tablet Take 5 mg by mouth daily (with breakfast). Indications: high blood pressure     dulaglutide (TRULICITY) 1.5 VN/6.9 mL sub-q pen 1.5 mg by SubCUTAneous route Every Saturday.  fexofenadine (ALLEGRA) 180 mg tablet Take 180 mg by mouth daily.  metFORMIN (GLUCOPHAGE) 1,000 mg tablet Take 1,000 mg by mouth two (2) times daily (with meals).  ergocalciferol (ERGOCALCIFEROL) 50,000 unit capsule Take 50,000 Units by mouth every Monday.  irbesartan (AVAPRO) 150 mg tablet Take 150 mg by mouth two (2) times a day.  atorvastatin (LIPITOR) 20 mg tablet Take 20 mg by mouth nightly. Monday, Tuesday, Wednesday, Thursday, Friday      metaxalone (SKELAXIN) 800 mg tablet Take 800 mg by mouth every six (6) hours as needed for Pain.  amLODIPine (NORVASC) 2.5 mg tablet Take 2.5 mg by mouth every evening.  carvedilol (COREG) 12.5 mg tablet Take 12.5 mg by mouth two (2) times a day.  esomeprazole (NEXIUM) 20 mg capsule Take 20 mg by mouth daily. No current facility-administered medications for this encounter.           Review of Systems - Negative except as in interval history       Objective:     Visit Vitals  /72 (BP 1 Location: Left arm, BP Patient Position: Sitting)   Pulse 91   Temp 98.2 °F (36.8 °C)   Resp 18   SpO2 100%       Temp (24hrs), Av.2 °F (36.8 °C), Min:98.2 °F (36.8 °C), Max:98.2 °F (36.8 °C)      GEN: pleasant 64year-old, CM in no distress   HEENT: no scleral icterus, no conjunctival injection  CHEST: no crackles or wheezes  CVS:regular, no murmurs  ABD: soft, non-tender  EXT: Left hip wound with profuse serosanguinous drainage, no mal-odor, no periwound cellulitis            Lab results     Chemistry  No results for input(s): GLU, NA, K, CL, CO2, BUN, CREA, CA, AGAP, BUCR, TBIL, GPT, AP, TP, ALB, GLOB, AGRAT in the last 72 hours. CBC w/ Diff  No results for input(s): WBC, RBC, HGB, HCT, PLT, GRANS, LYMPH, EOS, HGBEXT, HCTEXT, PLTEXT in the last 72 hours.     Microbiology  All Micro Results     None             Delvin Georges MD, [facility] Para  Infectious Disease Specialist  Pager [phone]

## 2019-06-07 ENCOUNTER — HOSPITAL ENCOUNTER (OUTPATIENT)
Dept: WOUND CARE | Age: 59
Discharge: HOME | End: 2019-06-07
Attending: FAMILY MEDICINE
Payer: COMMERCIAL

## 2019-06-07 VITALS
OXYGEN SATURATION: 97 % | DIASTOLIC BLOOD PRESSURE: 99 MMHG | HEART RATE: 81 BPM | RESPIRATION RATE: 17 BRPM | TEMPERATURE: 98.2 F | SYSTOLIC BLOOD PRESSURE: 171 MMHG

## 2019-06-07 DIAGNOSIS — S71.002D OPEN WOUND OF LEFT HIP, SUBSEQUENT ENCOUNTER: Primary | ICD-10-CM

## 2019-06-07 PROCEDURE — 97605 NEG PRS WND THER DME<=50SQCM: CPT

## 2019-06-07 RX ORDER — TRAMADOL HYDROCHLORIDE 50 MG/1
50 TABLET ORAL
Qty: 30 TAB | Refills: 0 | Status: SHIPPED | OUTPATIENT
Start: 2019-06-07 | End: 2019-06-12

## 2019-06-07 NOTE — PROGRESS NOTES
Patient presents to wound clinic for: Joseline Self is a 64 y.o. male whom presents follow up of his post op wound infection. He had the wound vacuum applied a few days ago. He is doing better with this. He does have some mild pain issues but no fevers or chills. Review of Systems:    Gen: No fever, chills, malaise, weight loss/gain. GI: No nausea, vomiting, diarrhea, constipation, melena or hematochezia. Derm: see below  Musc/skeletal: no bone or joint complains. Vasc: No edema, cyanosis or claudication. Exam:   Visit Vitals  BP (!) 171/99 (BP 1 Location: Left arm, BP Patient Position: Sitting)   Pulse 81   Temp 98.2 °F (36.8 °C)   Resp 17   SpO2 97%     Gen: Pleasant obese middle aged male in NAD. Neuro: A+OX4, gross motor intact. Wound Description:      06/07/19 0843   Wound Hip Left sutures intact 05/30/19   Date First Assessed/Time First Assessed: 05/30/19 1623   Present on Hospital Admission: Yes  Wound Approximate Age at First Assessment (Weeks): 14 weeks  Primary Wound Type: Open incision/surgical site  Location: Hip  Wound Location Orientation: Left . ..    Dressing Status Reinforced   Dressing Type Negative pressure wound therapy   Incision Site Well Approximated No   Non-staged Wound Description Full thickness   Shape round    Wound Length (cm) 3.5 cm   Wound Width (cm) 0.5 cm   Wound Depth (cm) 4.2 cm   Wound Volume (cm^3) 7.35 cm^3   Condition of Base Slough   Condition of Edges Rolled/curled   Assessment Drainage   Tunneling (cm)   (no tunneling noted at time of assessment )   Drainage Color Serous   Wound Odor None   Aurelia-wound Assessment Blanchable erythema   Margins Defined edges   Cleansing and Cleansing Agents  Other (comment)  (vashe)   Dressing Changed Changed/New     Debridement: not required  today    Infection: Yes  Antibiotic:   IV: continue    Name: Rocephin    Assessment:    Patient Active Problem List   Diagnosis Code    High cholesterol E78.00    HTN (hypertension) I10    Osteoarthritis of left hip M16.12    Postoperative wound infection of left hip T81.49XA    Open wound of left hip S71.002A    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 It has greatly diminished the serous drainage. Will continue with this. Plan: 1. Wound vacuum. 2. Consider debridement of the wound edges next visit. 3. RTC in 1 week.     Marlena Short MD

## 2019-06-07 NOTE — DISCHARGE INSTRUCTIONS
PLEASE CONTACT OFFICE AS SOON AS POSSIBLE IF UNABLE TO MAKE THIS APPOINTMENT. Inspect your wounds, looking for signs of infection which may include the following:  Increase in redness  Red \"streaks\" from wound  Increase in swelling  Fever  Unusual odor  Change in the amount of wound drainage. Should you experience any significant changes in your wound(s) or have any questions regarding your home care instructions please contact the wound center or your home health company. If after regular business hours, please call your family doctor or local emergency room. Edema Control: Elevate legs as much as possible. Avoid standing in one position for more than 10 minutes. Avoid setting with legs down. Do not cross legs while sitting. Off-Loading:Frequent position changes. Do not cross legs while sitting. Shift weight every 20 minutes or more when sitting for prolonged periods of time.

## 2019-06-10 ENCOUNTER — HOSPITAL ENCOUNTER (OUTPATIENT)
Dept: WOUND CARE | Age: 59
Discharge: HOME | End: 2019-06-10
Attending: FAMILY MEDICINE
Payer: COMMERCIAL

## 2019-06-10 VITALS
SYSTOLIC BLOOD PRESSURE: 142 MMHG | TEMPERATURE: 97.9 F | RESPIRATION RATE: 18 BRPM | OXYGEN SATURATION: 96 % | DIASTOLIC BLOOD PRESSURE: 91 MMHG | HEART RATE: 77 BPM

## 2019-06-10 PROCEDURE — 97605 NEG PRS WND THER DME<=50SQCM: CPT

## 2019-06-10 PROCEDURE — 97607 NEG PRS WND THR NDME<=50SQCM: CPT

## 2019-06-10 NOTE — PROGRESS NOTES
Patient presents to wound clinic for: Jeremiah Schwab is a 64 y.o. male whom presents follow up of his post op wound. Since the vacuum was attached he has only filled up one canister. He has decreased pain. No fevers. Review of Systems:    Gen: No fever, chills, malaise, weight loss/gain. GI: No nausea, vomiting, diarrhea, constipation, melena or hematochezia. Derm: see below  Musc/skeletal: no bone or joint complains. Vasc: No edema, cyanosis or claudication. Exam:   Visit Vitals  BP (!) 142/91 (BP 1 Location: Left arm, BP Patient Position: Sitting)   Pulse 77   Temp 97.9 °F (36.6 °C)   Resp 18   SpO2 96%     Gen: Obese, middle age male in NAD. Neuro: A+OX4, Gross motor intact. Wound Description:     06/10/19 1130   Wound Hip Left 05/30/19   Date First Assessed/Time First Assessed: 05/30/19 1623   Present on Hospital Admission: Yes  Wound Approximate Age at First Assessment (Weeks): 14 weeks  Primary Wound Type: Open incision/surgical site  Location: Hip  Wound Location Orientation: Left . .. Dressing Status Clean; Old drainage   Dressing Type Negative pressure wound therapy   Incision Site Well Approximated No   Non-staged Wound Description Full thickness   Wound Length (cm) 3.5 cm   Wound Width (cm) 0.5 cm   Wound Depth (cm) 4.2 cm   Wound Volume (cm^3) 7.35 cm^3   Condition of Base Slough   Condition of Edges Rolled/curled   Assessment Drainage   Drainage Color Serous   Wound Odor None   Aurelia-wound Assessment Blanchable erythema   Margins Defined edges       Debridement: not required today.      Infection: Yes  Type: none    Antibiotic:      IV: continue    Name: Rocephin        Edema: No      Assessment:    Patient Active Problem List   Diagnosis Code    High cholesterol E78.00    HTN (hypertension) I10    Osteoarthritis of left hip M16.12    Postoperative wound infection of left hip T81.49XA    Open wound of left hip S71.002A    Prosthetic joint infection of left hip ([address] T84.52XA Pt wound looks better. The tunneling is less. The wound vacuum is doing good work. He continues to be on IV ABX per ID. Plan: 1. Wound Vacuum - Will continue for another few weeks. 2. Debridement - not needed today but most likely will need to do it next week. 3. RTC in 1 week.      Blake Francis MD

## 2019-06-10 NOTE — WOUND CARE
06/10/19 1130 Wound Hip Left 05/30/19 Date First Assessed/Time First Assessed: 05/30/19 1623   Present on Hospital Admission: Yes  Wound Approximate Age at First Assessment (Weeks): 14 weeks  Primary Wound Type: Open incision/surgical site  Location: Hip  Wound Location Orientation: Left . .. Dressing Status Clean; Old drainage Dressing Type Negative pressure wound therapy Incision Site Well Approximated No  
Non-staged Wound Description Full thickness Wound Length (cm) 3.5 cm Wound Width (cm) 0.5 cm Wound Depth (cm) 4.2 cm Wound Volume (cm^3) 7.35 cm^3 Condition of [facility] Condition of Edges Rolled/curled Assessment Drainage Drainage Color Serous Wound Odor None Aurelia-wound Assessment Blanchable erythema Margins Defined edges Cleansing and Cleansing Agents  Normal saline Dressing Changed Changed/New Dressing Type Applied Negative pressure wound therapy (White foam to tunnel area, black foam bolster,drape, prep.) Procedure Tolerated Well

## 2019-06-10 NOTE — DISCHARGE INSTRUCTIONS
In wound care clinic today:  Cleanse wound with NS or soap and water or commercial wound cleanser  Apply the following to refugio-wound: Allkare skin prep  Apply the following dressings: Wound vac, white sponge in depth    For Home Care/Self Care  Keep dressing dry and intact when bathing  Leave dressings in place until next visit    Patient to return for wound care in: Twice weekly for dressing change     PLEASE CONTACT OFFICE AS SOON AS POSSIBLE IF UNABLE TO MAKE THIS APPOINTMENT. Inspect your wounds, looking for signs of infection which may include the following:  Increase in redness  Red \"streaks\" from wound  Increase in swelling  Fever  Unusual odor  Change in the amount of wound drainage. Should you experience any significant changes in your wound(s) or have any questions regarding your home care instructions please contact the wound center or your home health company. If after regular business hours, please call your family doctor or local emergency room. Edema Control: Elevate legs as much as possible. Avoid standing in one position for more than 10 minutes. Avoid setting with legs down. Do not cross legs while sitting. Off-Loading:Frequent position changes. Do not cross legs while sitting.  Shift weight every 20 minutes or more when sitting for prolonged periods of time.          Collection Information     Verbal Order Info     Action Created on Order Mode Entered by Comment Responsible Provider Signed by Signed on   Ordering 06/07/19 0949 Verbal with Hilary Yan RN Towanda Garner, MD Towanda Garner, MD 06/07/19 0667   Additional Information     Associated Reports   View Encounter   Priority and Order Details   NPI Information        Electronically signed by Authorizing Provider: Geo Rai [id number]     Order start date/time: 6/10/2019 4:00 AM  Electronically signed by Co-signing Provider (if required):                 Order Report     Order Details

## 2019-06-13 ENCOUNTER — HOSPITAL ENCOUNTER (OUTPATIENT)
Dept: WOUND CARE | Age: 59
Discharge: HOME | End: 2019-06-13
Attending: FAMILY MEDICINE
Payer: COMMERCIAL

## 2019-06-13 VITALS
HEART RATE: 78 BPM | DIASTOLIC BLOOD PRESSURE: 91 MMHG | RESPIRATION RATE: 17 BRPM | SYSTOLIC BLOOD PRESSURE: 135 MMHG | TEMPERATURE: 98.1 F | OXYGEN SATURATION: 97 %

## 2019-06-13 PROCEDURE — 97606 NEG PRS WND THER DME>50 SQCM: CPT

## 2019-06-13 PROCEDURE — 11045 DBRDMT SUBQ TISS EACH ADDL: CPT

## 2019-06-13 PROCEDURE — 11042 DBRDMT SUBQ TIS 1ST 20SQCM/<: CPT

## 2019-06-13 NOTE — WOUND CARE
06/07/19 8916 Wound Hip Left 05/30/19 Date First Assessed/Time First Assessed: 05/30/19 1623   Present on Hospital Admission: Yes  Wound Approximate Age at First Assessment (Weeks): 14 weeks  Primary Wound Type: Open incision/surgical site  Location: Hip  Wound Location Orientation: Left . .. Dressing Status Reinforced Dressing Type Negative pressure wound therapy Incision Site Well Approximated No  
Non-staged Wound Description Full thickness Shape round Wound Length (cm) 3.5 cm Wound Width (cm) 0.5 cm Wound Depth (cm) 4.2 cm Wound Volume (cm^3) 7.35 cm^3 Condition of [facility] Condition of Edges Rolled/curled Assessment Drainage Tunneling (cm)  
(no tunneling noted at time of assessment ) Drainage Color Serous Wound Odor None Aurelia-wound Assessment Blanchable erythema Cleansing and Cleansing Agents  Other (comment) (vashe) Dressing Changed Changed/New Dressing Type Applied Negative pressure wound therapy 
(white sponge in depth ) Procedure Tolerated Well

## 2019-06-13 NOTE — WOUND CARE
06/13/19 1520 Wound Hip Left 05/30/19 Date First Assessed/Time First Assessed: 05/30/19 1623   Present on Hospital Admission: Yes  Wound Approximate Age at First Assessment (Weeks): 14 weeks  Primary Wound Type: Open incision/surgical site  Location: Hip  Wound Location Orientation: Left . .. Dressing Status Clean, dry, and intact Dressing Type Negative pressure wound therapy Incision Site Well Approximated No  
Non-staged Wound Description Full thickness Wound Length (cm) 2.2 cm Wound Width (cm) 0.5 cm Wound Depth (cm) 2.4 cm Wound Volume (cm^3) 2.64 cm^3 Condition of [facility] Condition of Edges Rolled/curled; Open Assessment Drainage Tunneling (cm) 3 cm Direction of Tunnel 9 o'clock Drainage Color Serosanguinous Wound Odor None Aurelia-wound Assessment Blanchable erythema Margins Defined edges Cleansing and Cleansing Agents (vashe) Dressing Changed Changed/New Dressing Type Applied Negative pressure wound therapy Procedure Tolerated Well

## 2019-06-15 NOTE — PROGRESS NOTES
Patient presents to wound clinic for: Rhoda Minor is a 64 y.o. male whom presents follow up of his post op wound. He says he is doing better. He has decreased pain and decreased drainage in the wound vacuum. He continues his IV ABX. Review of Systems:    Gen: No fever, chills, malaise, weight loss/gain. GI: No nausea, vomiting, diarrhea, constipation, melena or hematochezia. Derm: see below  Musc/skeletal: no bone or joint complains. Vasc: No edema, cyanosis or claudication. Exam:   Visit Vitals  BP (!) 135/91 (BP 1 Location: Left arm, BP Patient Position: Sitting)   Pulse 78   Temp 98.1 °F (36.7 °C)   Resp 17   SpO2 97%     Gen: Obese middle aged male in NAD. Neuro: A+OX4      Wound Description:      06/13/19 1520   Wound Hip Left 05/30/19   Date First Assessed/Time First Assessed: 05/30/19 1623   Present on Hospital Admission: Yes  Wound Approximate Age at First Assessment (Weeks): 14 weeks  Primary Wound Type: Open incision/surgical site  Location: Hip  Wound Location Orientation: Left . .. Dressing Status Clean, dry, and intact   Dressing Type Negative pressure wound therapy   Incision Site Well Approximated No   Non-staged Wound Description Full thickness   Wound Length (cm) 2.2 cm   Wound Width (cm) 0.5 cm   Wound Depth (cm) 2.4 cm   Wound Volume (cm^3) 2.64 cm^3   Condition of Base Slough   Condition of Edges Rolled/curled; Open   Assessment Drainage   Tunneling (cm) 3 cm   Direction of Tunnel 9 o'clock   Drainage Color Serosanguinous   Wound Odor None   Aurelia-wound Assessment Blanchable erythema   Margins Defined edges       Debridement: required today to promote wound healing   Ulcer Debridement    Ulcer Number 1; Left  Surgical Thigh To Muscle     Character of Ulcer: Improved    Indication for Debridement: Slough, Exudate and Abnormal wound edge      Risks of procedure were discussed with patient. Consent has been signed.      Anesthetic: None     Level of Debridement: Fibrin/ Exudate/ Debris/ Biofilm     Tissue and/or Material removed: Fibrin/ Slough    Type of Tissue: Non- Viable      Instrument(s) used: Curette    Bleeding controlled with: Pressure    Estimated blood loss: Minimal      Post procedural pain: none    Patient tolerated procedure well. Infection: Yes        Assessment:    Patient Active Problem List   Diagnosis Code    High cholesterol E78.00    HTN (hypertension) I10    Osteoarthritis of left hip M16.12    Postoperative wound infection of left hip T81.49XA    Open wound of left hip S71.002A    Prosthetic joint infection of left hip ([address] T84.52XA     The patient is making progress. There is less drainage in the vacuum. The tunneling is less than the last visit. Plan:1. Continue wound vacuum. 2. Serial Debridements. 3. Continue IV ABX. 4. RTC in 1 week.     Dorita Rojas MD

## 2019-06-17 ENCOUNTER — HOSPITAL ENCOUNTER (OUTPATIENT)
Dept: WOUND CARE | Age: 59
Discharge: HOME | End: 2019-06-17
Attending: FAMILY MEDICINE
Payer: COMMERCIAL

## 2019-06-17 VITALS
SYSTOLIC BLOOD PRESSURE: 135 MMHG | OXYGEN SATURATION: 98 % | HEART RATE: 76 BPM | RESPIRATION RATE: 17 BRPM | TEMPERATURE: 98 F | DIASTOLIC BLOOD PRESSURE: 87 MMHG

## 2019-06-17 PROCEDURE — 97605 NEG PRS WND THER DME<=50SQCM: CPT

## 2019-06-17 NOTE — WOUND CARE
06/17/19 1508 Wound Hip Left 05/30/19 Date First Assessed/Time First Assessed: 05/30/19 1623   Present on Hospital Admission: Yes  Wound Approximate Age at First Assessment (Weeks): 14 weeks  Primary Wound Type: Open incision/surgical site  Location: Hip  Wound Location Orientation: Left . .. Dressing Status Removed Dressing Type Negative pressure wound therapy Incision Site Well Approximated No  
Non-staged Wound Description Full thickness Wound Length (cm) 3.7 cm Wound Width (cm) 1 cm Wound Depth (cm) 3 cm Wound Volume (cm^3) 11.1 cm^3 Condition of Base Rolled/curled;Slough Condition of Edges Rolled/curled; Open Assessment Drainage Tunneling (cm) 3 cm Direction of Tunnel 9 o'clock Drainage Color Serosanguinous Wound Odor None Aurelia-wound Assessment Blanchable erythema Margins Defined edges Cleansing and Cleansing Agents  Normal saline Dressing Changed Changed/New Dressing Type Applied Foam;Negative pressure wound therapy 
(white foam, drape) Procedure Tolerated Well Patient see by nurse per Dr. Aimee Orlando order on 06-

## 2019-06-17 NOTE — DISCHARGE INSTRUCTIONS
For Home Care/Self Care  Keep dressing dry and intact when bathing  Leave dressings in place until next visit    Patient to return for wound care in: Twice weekly for dressing change     PLEASE CONTACT OFFICE AS SOON AS POSSIBLE IF UNABLE TO MAKE THIS APPOINTMENT. Inspect your wounds, looking for signs of infection which may include the following:  Increase in redness  Red \"streaks\" from wound  Increase in swelling  Fever  Unusual odor  Change in the amount of wound drainage. Should you experience any significant changes in your wound(s) or have any questions regarding your home care instructions please contact the wound center or your home health company. If after regular business hours, please call your family doctor or local emergency room. Edema Control: Elevate legs as much as possible. Avoid standing in one position for more than 10 minutes. Avoid setting with legs down. Do not cross legs while sitting. Off-Loading:Frequent position changes. Do not cross legs while sitting.  Shift weight every 20 minutes or more when sitting for prolonged periods of time.       Collection Information     Verbal Order Info     Action Created on Order Mode Entered by Comment Responsible Provider Signed by Signed on  Ordering 06/07/19 0951 Verbal with Hilary Cabrera RN Cleven Lady, MD Cleven Lady, MD 06/07/19 7299  Additional Information     Associated Reports  View Encounter  Priority and Order Details  NPI Information        Electronically signed by Authorizing Provider: Yaniv Lamar [id number]     Order start date/time: 6/10/2019 4:00 AM  Electronically signed by Co-signing Provider (if required):              Order Report     Order Details          Collection Information     Verbal Order Info     Action Created on Order Mode Entered by Comment Responsible Provider Signed by Signed on   Ordering 06/10/19 1139 Verbal with CARLOS MANUEL Jimenez MD Lokesh Davidson MD 06/10/19 1617   Appointment Notes     need to sign vac paperwork and have vac assigned           Additional Information     Associated Reports   View Encounter   Priority and Order Details   NPI Information        Electronically signed by Authorizing Provider: Gautam Coats [id number]     Order start date/time: 6/10/2019 11:45 AM  Electronically signed by Co-signing Provider (if required):                 Order Report     Order Details

## 2019-06-19 ENCOUNTER — HOSPITAL ENCOUNTER (OUTPATIENT)
Dept: WOUND CARE | Age: 59
Discharge: HOME | End: 2019-06-19
Attending: INTERNAL MEDICINE
Payer: COMMERCIAL

## 2019-06-19 VITALS
HEART RATE: 78 BPM | DIASTOLIC BLOOD PRESSURE: 89 MMHG | OXYGEN SATURATION: 98 % | RESPIRATION RATE: 19 BRPM | TEMPERATURE: 98.4 F | SYSTOLIC BLOOD PRESSURE: 151 MMHG

## 2019-06-19 PROCEDURE — 99215 OFFICE O/P EST HI 40 MIN: CPT

## 2019-06-19 NOTE — PROGRESS NOTES
Infectious Disease Follow-up Note      Date of Admission: 6/19/2019     Date of Note:  6/19/2019    Summary:      65 y/o CM Obese, HTN, Hyperchol, DM w/ L hip PJI s/p one stage exchange 5/22. Cx MSSA. Prior wd infection MSSA, CONS. Discharged 5/24 on Cefazolin/Vanco - nausea with Cefazolin changed to Ceftriaxone 5/29. Nausea eventually resolved     Interval History:      No more nausea. Wound healing. Tolerating Ceftriaxone. PICC port not functioning for lab draws     Current Antimicrobials: Prior Antimicrobials   Ceftriaxone 5/29  - 20  Rifampin 5/24 - 25  Cefazolin 5/23 - 6 Vancomycin 5/22 - 14       Assessment / Plan:      Prosthetic Joint Infection L hip  -  L direct ant KATHERINE 2/13/2019   - subsequent superficial wd infection I&D 3/13. Cx MSSA broth only, rare CONS.    - MRSA screen January, 5/22 negative.    - s/p 5/22 L hip I&D of hip replacement, periprosthetic infection,revision L hip replacement (new liner, femoral head). (one stage exchange)  - Cx few MSSA -> continue Ceftriaxone, rifampin until 7/3/2019, then po suppression with Keflex, Rifampin x 3-6 months  -> CBC, BMP, ESR, CRP weekly, followup 7/3 - will pull PICC then  -> add HgbA1c to next lab per patient request prior to PCP appt - called to [facility] [phone].   Also requested TPA to unclog port and clarified end date for Ceftriaxone as 7/3   Wound dehiscence  - improving with local wound care per Wound Clinic    Nausea  - from Cefazolin  - resolved     Obesity     DM     HTN     Hypercholesterolemia     GERD     DJD        Microbiology:   5/22      Ed cx possible Staph spp               MRSA scrn neg     Lines / Catheters:   R PICC      Patient Active Problem List   Diagnosis Code    High cholesterol E78.00    HTN (hypertension) I10    Osteoarthritis of left hip M16.12    Postoperative wound infection of left hip T81.49XA    Open wound of left hip S71.002A    Prosthetic joint infection of left hip ([address] T84.52XA       Current Outpatient Medications   Medication Sig Dispense    ceFAZolin 2 g IV syringe 2 g by IntraVENous route every eight (8) hours for 40 days. 126 Dose    rifAMPin (RIFADIN) 150 mg capsule Take 2 Caps by mouth Before breakfast and dinner. 84 Cap    aspirin 81 mg chewable tablet Take 1 Tab by mouth two (2) times a day. 42 Tab    naloxone (NARCAN) 4 mg/actuation nasal spray Use 1 spray intranasally into 1 nostril. Use a new Narcan nasal spray for subsequent doses and administer into alternating nostrils. May repeat every 2 to 3 minutes as needed. 1 Each    gabapentin (NEURONTIN) 400 mg capsule Take 400 mg by mouth three (3) times daily. Indications: for the hip     amLODIPine (NORVASC) 5 mg tablet Take 5 mg by mouth daily (with breakfast). Indications: high blood pressure     dulaglutide (TRULICITY) 1.5 YV/3.5 mL sub-q pen 1.5 mg by SubCUTAneous route Every Saturday.  fexofenadine (ALLEGRA) 180 mg tablet Take 180 mg by mouth daily.  metFORMIN (GLUCOPHAGE) 1,000 mg tablet Take 1,000 mg by mouth two (2) times daily (with meals).  ergocalciferol (ERGOCALCIFEROL) 50,000 unit capsule Take 50,000 Units by mouth every Monday.  irbesartan (AVAPRO) 150 mg tablet Take 150 mg by mouth two (2) times a day.  atorvastatin (LIPITOR) 20 mg tablet Take 20 mg by mouth nightly. Monday, Tuesday, Wednesday, Thursday, Friday      metaxalone (SKELAXIN) 800 mg tablet Take 800 mg by mouth every six (6) hours as needed for Pain.  amLODIPine (NORVASC) 2.5 mg tablet Take 2.5 mg by mouth every evening.  carvedilol (COREG) 12.5 mg tablet Take 12.5 mg by mouth two (2) times a day.  esomeprazole (NEXIUM) 20 mg capsule Take 20 mg by mouth daily. No current facility-administered medications for this encounter.           Review of Systems - Negative except as in interval history       Objective:     Visit Vitals  /89 (BP 1 Location: Left arm, BP Patient Position: At rest;Sitting)   Pulse 78   Temp 98.4 °F (36.9 °C)   Resp 19   SpO2 98%       Temp (24hrs), Av.4 °F (36.9 °C), Min:98.4 °F (36.9 °C), Max:98.4 °F (36.9 °C)       GEN: pleasant 64year-old, CM in no distress   HEENT: no scleral icterus, no conjunctival injection  CHEST: no crackles or wheezes  CVS:regular, no murmurs  ABD: soft, non-tender  EXT: Left hip wound not examined today - reviewed wound care photo       Lab results     Chemistry  No results for input(s): GLU, NA, K, CL, CO2, BUN, CREA, CA, AGAP, BUCR, TBIL, GPT, AP, TP, ALB, GLOB, AGRAT in the last 72 hours. CBC w/ Diff  No results for input(s): WBC, RBC, HGB, HCT, PLT, GRANS, LYMPH, EOS, HGBEXT, HCTEXT, PLTEXT in the last 72 hours.     Microbiology  All Micro Results     None             Eli Sneed MD, [facility]  Infectious Disease Specialist  Pager [phone]

## 2019-06-20 ENCOUNTER — HOSPITAL ENCOUNTER (OUTPATIENT)
Dept: WOUND CARE | Age: 59
Discharge: HOME | End: 2019-06-20
Attending: FAMILY MEDICINE
Payer: COMMERCIAL

## 2019-06-20 VITALS
SYSTOLIC BLOOD PRESSURE: 141 MMHG | OXYGEN SATURATION: 99 % | HEART RATE: 78 BPM | DIASTOLIC BLOOD PRESSURE: 67 MMHG | RESPIRATION RATE: 18 BRPM | TEMPERATURE: 98.4 F

## 2019-06-20 PROCEDURE — 97605 NEG PRS WND THER DME<=50SQCM: CPT

## 2019-06-20 PROCEDURE — 11042 DBRDMT SUBQ TIS 1ST 20SQCM/<: CPT

## 2019-06-20 PROCEDURE — 11045 DBRDMT SUBQ TISS EACH ADDL: CPT

## 2019-06-24 ENCOUNTER — HOSPITAL ENCOUNTER (OUTPATIENT)
Dept: WOUND CARE | Age: 59
Discharge: HOME | End: 2019-06-24
Attending: FAMILY MEDICINE
Payer: COMMERCIAL

## 2019-06-24 VITALS
TEMPERATURE: 98.8 F | DIASTOLIC BLOOD PRESSURE: 71 MMHG | SYSTOLIC BLOOD PRESSURE: 164 MMHG | RESPIRATION RATE: 20 BRPM | OXYGEN SATURATION: 99 %

## 2019-06-24 PROCEDURE — 97605 NEG PRS WND THER DME<=50SQCM: CPT

## 2019-06-24 PROCEDURE — 11042 DBRDMT SUBQ TIS 1ST 20SQCM/<: CPT

## 2019-06-24 NOTE — PROGRESS NOTES
Patient presents to wound clinic for: Guillermo Johnson is a 64 y.o. male whom presents follow up of his post op wound. He has had much less drainage with the wound vacuum the last few days. He is more active. He has decreased pain. He has no other complaints. Review of Systems:    Gen: No fever, chills, malaise, weight loss/gain. GI: No nausea, vomiting, diarrhea, constipation, melena or hematochezia. Derm: see below  Musc/skeletal: no bone or joint complains. Vasc: No edema, cyanosis or claudication. Exam:   Visit Vitals  /67 (BP 1 Location: Left arm)   Pulse 78   Temp 98.4 °F (36.9 °C)   Resp 18   SpO2 99%     Gen: Pleasant obese middle aged male in NAD. Wound Description:      06/20/19 1609   Wound Hip Left 05/30/19   Date First Assessed/Time First Assessed: 05/30/19 1623   Present on Hospital Admission: Yes  Wound Approximate Age at First Assessment (Weeks): 14 weeks  Primary Wound Type: Open incision/surgical site  Location: Hip  Wound Location Orientation: Left . .. Dressing Status Removed  (woundvac removed, dressing saturated)   Dressing Type Negative pressure wound therapy   Incision Site Well Approximated No   Non-staged Wound Description Full thickness   Wound Length (cm) 3.6 cm   Wound Width (cm) 1 cm   Wound Depth (cm) 2.8 cm   Wound Volume (cm^3) 10.08 cm^3   Condition of [facility]; Adipose exposed   Condition of Edges Open   Assessment Drainage   Tunneling (cm) 2 cm   Direction of Tunnel 9 o'clock   Drainage Color Serosanguinous   Wound Odor None   Aurelia-wound Assessment Blanchable erythema   Margins Defined edges   Cleansing and Cleansing Agents    (foam wash,vashe)   Dressing Changed Changed/New   Dressing Type Applied Negative pressure wound therapy     Debridement: required today to promote wound healing   Wound Procedure Type Devitalized Tissues   Procedure Time Out   (yes)   Consent Obtained  Yes   Procedure Bleeding Minimal   Procedure Hemostasis  Pressure   Type of Tissue Removed  Devitalized, dermis, epidermis, subcutaneous   Procedure Instrument  Curette   Procedure Pain Scale Numeric 0/10   Post-Procedure Length (cm) 3.6 cm   Post-Procedure Width (cm) 1.2 cm   Post-Procedure Depth (cm) 2.8 cm   Post-Procedure Volume (cm^3) 12.1 cm^3   Post-Procedure Surface Area (cm^2) 4.32 cm^2   Procedure Tolerated Well         Infection: Yes    Assessment:    Patient Active Problem List   Diagnosis Code    High cholesterol E78.00    HTN (hypertension) I10    Osteoarthritis of left hip M16.12    Postoperative wound infection of left hip T81.49XA    Open wound of left hip S71.002A    Prosthetic joint infection of left hip ([facility]) T84.52XA       There is less drainage this week compared to last.  Hopefully we can discontinue the drain soon. The wound is more shallow. Plan: 1. Serial debridements. 2. Wound Vacuum. 3. RTC in 1 week. 4. Continue ABX per ID.     Aline Bailon MD

## 2019-06-27 ENCOUNTER — HOSPITAL ENCOUNTER (OUTPATIENT)
Dept: WOUND CARE | Age: 59
Discharge: HOME | End: 2019-06-27
Attending: FAMILY MEDICINE
Payer: COMMERCIAL

## 2019-06-27 PROCEDURE — 11042 DBRDMT SUBQ TIS 1ST 20SQCM/<: CPT

## 2019-06-28 NOTE — WOUND CARE
06/24/19 1611 Wound Hip Left 05/30/19 Date First Assessed/Time First Assessed: 05/30/19 1623   Present on Hospital Admission: Yes  Wound Approximate Age at First Assessment (Weeks): 14 weeks  Primary Wound Type: Open incision/surgical site  Location: Hip  Wound Location Orientation: Left . .. Dressing Status Removed Dressing Type Negative pressure wound therapy Incision Site Well Approximated No  
Non-staged Wound Description Full thickness Wound Length (cm) 3 cm Wound Width (cm) 0.4 cm Wound Depth (cm) 1.5 cm Wound Volume (cm^3) 1.8 cm^3 Condition of Base Adipose exposed Condition of Edges Open Assessment Drainage Tunneling (cm) 1 cm Direction of Tunnel 7 o'clock Drainage Color Serosanguinous Wound Odor None Aurelia-wound Assessment Blanchable erythema Margins Defined edges Cleansing and Cleansing Agents (vashe) Dressing Changed Changed/New Dressing Type Applied Negative pressure wound therapy Procedure Tolerated Well

## 2019-06-28 NOTE — WOUND CARE
06/27/19 1402 Wound Hip Left 05/30/19 Date First Assessed/Time First Assessed: 05/30/19 1623   Present on Hospital Admission: Yes  Wound Approximate Age at First Assessment (Weeks): 14 weeks  Primary Wound Type: Open incision/surgical site  Location: Hip  Wound Location Orientation: Left . .. Dressing Status Clean, dry, and intact Dressing Type Negative pressure wound therapy Incision Site Well Approximated No  
Non-staged Wound Description Full thickness Wound Length (cm) 2.9 cm Wound Width (cm) 0.4 cm Wound Depth (cm) 1.9 cm Wound Volume (cm^3) 2.2 cm^3 Condition of Base Adipose exposed Condition of Edges Closed Assessment Drainage Drainage Color Serosanguinous Wound Odor None Aurelia-wound Assessment Blanchable erythema Margins Defined edges Cleansing and Cleansing Agents  Dermal wound cleanser Dressing Type Applied Other (Comment) (promogran, mo, exudry, hypafix tape, drape) Wound Procedure Type Debridement- Surgical  
Procedure Time Out 9164 Consent Obtained  Yes Procedure Bleeding Minimal  
Procedure Hemostasis  Pressure Procedure Instrument  Curette Procedure Pain Scale Numeric 0/10 Post-Procedure Length (cm) 2.9 cm Post-Procedure Width (cm) 0.5 cm Post-Procedure Depth (cm) 2 cm Post-Procedure Volume (cm^3) 2.9 cm^3 Post-Procedure Surface Area (cm^2) 1.45 cm^2 Procedure Tolerated Well

## 2019-06-28 NOTE — DISCHARGE INSTRUCTIONS
For Home Care/Self Care  Keep dressing dry and intact when bathing  Try to keep dressings in place until next visit. May reinforce if needed  Spoke with Ann Marie from [facility] [id number]. Gave verbal orders for dressings to be ordered - exudry and hypafix tape. Patient to return for wound care in: Twice weekly for dressing change     PLEASE CONTACT OFFICE AS SOON AS POSSIBLE IF UNABLE TO MAKE THIS APPOINTMENT. Inspect your wounds, looking for signs of infection which may include the following:  Increase in redness  Red \"streaks\" from wound  Increase in swelling  Fever  Unusual odor  Change in the amount of wound drainage. Should you experience any significant changes in your wound(s) or have any questions regarding your home care instructions please contact the wound center or your home health company. If after regular business hours, please call your family doctor or local emergency room. Edema Control: Elevate legs as much as possible. Avoid standing in one position for more than 10 minutes. Avoid setting with legs down. Do not cross legs while sitting. Off-Loading:Frequent position changes. Do not cross legs while sitting. Shift weight every 20 minutes or more when sitting for prolonged periods of time.

## 2019-06-28 NOTE — DISCHARGE INSTRUCTIONS
In wound care clinic today:  Cleanse wound with NS or soap and water or commercial wound cleanser  Apply the following to refugio-wound: Allkare skin prep  Apply the following dressings: Wound vac, white sponge in depth    For Home Care/Self Care  Keep dressing dry and intact when bathing  Leave dressings in place until next visit    Patient to return for wound care in: Twice weekly for dressing change     PLEASE CONTACT OFFICE AS SOON AS POSSIBLE IF UNABLE TO MAKE THIS APPOINTMENT. Inspect your wounds, looking for signs of infection which may include the following:  Increase in redness  Red \"streaks\" from wound  Increase in swelling  Fever  Unusual odor  Change in the amount of wound drainage. Should you experience any significant changes in your wound(s) or have any questions regarding your home care instructions please contact the wound center or your home health company. If after regular business hours, please call your family doctor or local emergency room. Edema Control: Elevate legs as much as possible. Avoid standing in one position for more than 10 minutes. Avoid setting with legs down. Do not cross legs while sitting. Off-Loading:Frequent position changes. Do not cross legs while sitting. Shift weight every 20 minutes or more when sitting for prolonged periods of time.

## 2019-06-28 NOTE — PROGRESS NOTES
Patient presents to wound clinic for: Anjali Espitia is a 64 y.o. male whom presents for follow up of his post op wound. Pt has had some drainage in his wound vacuum but wants to discontinue it today. He has no other complaints. He has 5 days of ABX left. Review of Systems:    Gen: No fever, chills, malaise, weight loss/gain. GI: No nausea, vomiting, diarrhea, constipation, melena or hematochezia. Derm: see below  Musc/skeletal: no bone or joint complains. Vasc: No edema, cyanosis or claudication. Exam: There were no vitals taken for this visit. Gen: Obese pleasant male in NAD. Neuro: A+OX4, Motor grossly intact. Wound Description:     06/27/19 1402    Wound Hip Left 05/30/19   Date First Assessed/Time First Assessed: 05/30/19 1623   Present on Hospital Admission: Yes  Wound Approximate Age at First Assessment (Weeks): 14 weeks  Primary Wound Type: Open incision/surgical site  Location: Hip  Wound Location Orientation: Left . ..    Dressing Status Clean, dry, and intact   Dressing Type Negative pressure wound therapy   Incision Site Well Approximated No   Non-staged Wound Description Full thickness   Wound Length (cm) 2.9 cm   Wound Width (cm) 0.4 cm   Wound Depth (cm) 1.9 cm   Wound Volume (cm^3) 2.2 cm^3   Condition of Base Adipose exposed   Condition of Edges Closed   Assessment Drainage   Drainage Color Serosanguinous   Wound Odor None   Aurelia-wound Assessment Blanchable erythema   Margins Defined edges               Debridement: required today to promote wound healing   Wound Procedure Type Debridement- Surgical   Procedure Time Out 1415   Consent Obtained  Yes   Procedure Bleeding Minimal   Procedure Hemostasis  Pressure   Procedure Instrument  Curette   Procedure Pain Scale Numeric 0/10   Post-Procedure Length (cm) 2.9 cm   Post-Procedure Width (cm) 0.5 cm   Post-Procedure Depth (cm) 2 cm   Post-Procedure Volume (cm^3) 2.9 cm^3   Post-Procedure Surface Area (cm^2) 1.45 cm^2   Procedure Tolerated Well         Assessment:    Patient Active Problem List   Diagnosis Code    High cholesterol E78.00    HTN (hypertension) I10    Osteoarthritis of left hip M16.12    Postoperative wound infection of left hip T81.49XA    Open wound of left hip S71.002A    Prosthetic joint infection of left hip ([address] T84.52XA       The patient is doing well. The drainage is minimal. Per patient request will DC today. Will continue the following:    Plan: 1. Serial Debridements. 2. Dressing changes  Cleansing and Cleansing Agents  Dermal wound cleanser   Dressing Type Applied Other (Comment)  (promogran, mo, exudry, hypafix tape, drape)     3. RTC in 1 week.      Alex Louise MD

## 2019-07-03 ENCOUNTER — HOSPITAL ENCOUNTER (OUTPATIENT)
Dept: WOUND CARE | Age: 59
Discharge: HOME | End: 2019-07-03
Payer: COMMERCIAL

## 2019-07-03 ENCOUNTER — HOSPITAL ENCOUNTER (OUTPATIENT)
Dept: WOUND CARE | Age: 59
Discharge: HOME | End: 2019-07-03
Attending: FAMILY MEDICINE
Payer: COMMERCIAL

## 2019-07-03 VITALS
SYSTOLIC BLOOD PRESSURE: 129 MMHG | DIASTOLIC BLOOD PRESSURE: 72 MMHG | HEART RATE: 70 BPM | TEMPERATURE: 97.9 F | RESPIRATION RATE: 18 BRPM | OXYGEN SATURATION: 97 %

## 2019-07-03 DIAGNOSIS — T84.52XD INFECTION ASSOCIATED WITH INTERNAL LEFT HIP PROSTHESIS, SUBSEQUENT ENCOUNTER: Primary | Chronic | ICD-10-CM

## 2019-07-03 PROCEDURE — 99211 OFF/OP EST MAY X REQ PHY/QHP: CPT

## 2019-07-03 NOTE — PROGRESS NOTES
Infectious Disease Follow-up Note      Date of Admission: 7/3/2019     Date of Note:  7/3/2019      Summary:      63 y/o CM Obese, HTN, Hyperchol, DM w/ L hip PJI s/p one stage exchange 5/22. Cx MSSA. Prior wd infection MSSA, CONS. Discharged 5/24 on Cefazolin/Vanco - nausea with Cefazolin changed to Ceftriaxone 5/29. Nausea eventually resolved     Interval History:      Doing well. No complaints. Received final dose of Ceftriaxone this am.  Denies joint pains.       Current Antimicrobials: Prior Antimicrobials   Ceftriaxone 5/29  - 35    abx 41  Cefazolin 5/23 - 6 Vancomycin 5/22 - 14   Rifampin 5/24 - ?5      Assessment / Plan:      Prosthetic Joint Infection L hip  -  L direct ant KATHERINE 2/13/2019   - subsequent superficial wd infection I&D 3/13. Cx MSSA broth only, rare CONS.    - MRSA screen January, 5/22 negative.    - s/p 5/22 L hip I&D of hip replacement, periprosthetic infection,revision L hip replacement (new liner, femoral head). (one stage exchange)  - Cx few MSSA  - was not taking Rifampin for past month due to upset stomach (had informed me previously) -> dc Ceftriaxone, dc PICC (removed in wound clinic 7/3)  -> Keflex 500 mg tid x 3 months  (has some keflex at home so called in 2 months supply to [facility], )  -> re-challenge with remaining rifampin at home if tolerated, continue 300 mg bid x 3 months.   If w/ gastric upset again - dc  -> follow up in two months   Wound dehiscence  - improving with local wound care per Wound Clinic     Nausea  - from Cefazolin  - resolved      Obesity     DM     HTN     Hypercholesterolemia     GERD     DJD        Microbiology:   5/22      Ed cx possible Staph spp               MRSA scrn neg     Lines / Catheters:   R PICC dc 7/3       Patient Active Problem List   Diagnosis Code    High cholesterol E78.00    HTN (hypertension) I10    Osteoarthritis of left hip M16.12    Postoperative wound infection of left hip T81.49XA    Open wound of left hip S71.002A    Prosthetic joint infection of left hip (HCC) T84.52XA           Current Outpatient Medications   Medication Sig Dispense    ceFAZolin 2 g IV syringe 2 g by IntraVENous route every eight (8) hours for 40 days. 126 Dose    rifAMPin (RIFADIN) 150 mg capsule Take 2 Caps by mouth Before breakfast and dinner. 84 Cap    aspirin 81 mg chewable tablet Take 1 Tab by mouth two (2) times a day. 42 Tab    naloxone (NARCAN) 4 mg/actuation nasal spray Use 1 spray intranasally into 1 nostril. Use a new Narcan nasal spray for subsequent doses and administer into alternating nostrils. May repeat every 2 to 3 minutes as needed. 1 Each    gabapentin (NEURONTIN) 400 mg capsule Take 400 mg by mouth three (3) times daily. Indications: for the hip     amLODIPine (NORVASC) 5 mg tablet Take 5 mg by mouth daily (with breakfast). Indications: high blood pressure     dulaglutide (TRULICITY) 1.5 GZ/5.4 mL sub-q pen 1.5 mg by SubCUTAneous route Every Saturday.  fexofenadine (ALLEGRA) 180 mg tablet Take 180 mg by mouth daily.  metFORMIN (GLUCOPHAGE) 1,000 mg tablet Take 1,000 mg by mouth two (2) times daily (with meals).  ergocalciferol (ERGOCALCIFEROL) 50,000 unit capsule Take 50,000 Units by mouth every Monday.  irbesartan (AVAPRO) 150 mg tablet Take 150 mg by mouth two (2) times a day.  atorvastatin (LIPITOR) 20 mg tablet Take 20 mg by mouth nightly. Monday, Tuesday, Wednesday, Thursday, Friday      metaxalone (SKELAXIN) 800 mg tablet Take 800 mg by mouth every six (6) hours as needed for Pain.  amLODIPine (NORVASC) 2.5 mg tablet Take 2.5 mg by mouth every evening.  carvedilol (COREG) 12.5 mg tablet Take 12.5 mg by mouth two (2) times a day.  esomeprazole (NEXIUM) 20 mg capsule Take 20 mg by mouth daily. No current facility-administered medications for this encounter. Review of Systems - Negative except as in interval history       Objective:      There were no vitals taken for this visit. No data recorded. GEN: pleasant, ambulatory    Lab results     Chemistry  No results for input(s): GLU, NA, K, CL, CO2, BUN, CREA, CA, AGAP, BUCR, TBIL, GPT, AP, TP, ALB, GLOB, AGRAT in the last 72 hours. CBC w/ Diff  No results for input(s): WBC, RBC, HGB, HCT, PLT, GRANS, LYMPH, EOS, HGBEXT, HCTEXT, PLTEXT in the last 72 hours.     Microbiology  All Micro Results     None             Terrence Bell MD, [facility]  Infectious Disease Specialist  Pager [phone]

## 2019-07-03 NOTE — DISCHARGE INSTRUCTIONS
For Home Care/Self Care:  Keep dressing dry and intact when bathing    May reinforce if needed      Patient to return for wound care in: Twice weekly for dressing change

## 2019-07-03 NOTE — WOUND CARE
07/03/19 1145   Wound Hip Left 05/30/19   Date First Assessed/Time First Assessed: 05/30/19 1623   Present on Hospital Admission: Yes  Wound Approximate Age at First Assessment (Weeks): 14 weeks  Primary Wound Type: Open incision/surgical site  Location: Hip  Wound Location Orientation: Left . ..    Dressing Status Intact   Dressing Type Absorptive   Incision Site Well Approximated No   Non-staged Wound Description Full thickness   Wound Length (cm) 2.9 cm   Wound Width (cm) 0.5 cm   Wound Depth (cm) 1.3 cm   Wound Volume (cm^3) 1.88 cm^3   Condition of Base Adipose exposed   Condition of Edges Closed   Drainage Color Serosanguinous   Wound Odor None   Aurelia-wound Assessment Intact   Margins Defined edges   Cleansing and Cleansing Agents  Dermal wound cleanser   Dressing Changed Changed/New   Dressing Type Applied   (promogran,mepitel one, bacitracin, gauze, exudry, drape)   Procedure Tolerated Well

## 2019-07-08 ENCOUNTER — HOSPITAL ENCOUNTER (OUTPATIENT)
Dept: WOUND CARE | Age: 59
Discharge: HOME | End: 2019-07-08
Attending: FAMILY MEDICINE
Payer: COMMERCIAL

## 2019-07-08 VITALS
DIASTOLIC BLOOD PRESSURE: 78 MMHG | SYSTOLIC BLOOD PRESSURE: 127 MMHG | HEART RATE: 82 BPM | RESPIRATION RATE: 17 BRPM | TEMPERATURE: 98.6 F | OXYGEN SATURATION: 97 %

## 2019-07-08 PROCEDURE — 11042 DBRDMT SUBQ TIS 1ST 20SQCM/<: CPT

## 2019-07-08 NOTE — DISCHARGE INSTRUCTIONS
For Home Care/Self Care  Keep dressing dry and intact when bathing    Leave dressings in place until next visit    Patient to return for wound care in: [address] IF UNABLE TO [facility]. Inspect your wounds, looking for signs of infection which may include the following:  Increase in redness  Red \"streaks\" from wound  Increase in swelling  Fever  Unusual odor  Change in the amount of wound drainage. Should you experience any significant changes in your wound(s) or have any questions regarding your home care instructions please contact the wound center or your home health company. If after regular business hours, please call your family doctor or local emergency room. Edema Control:   Elevate legs as much as possible. Avoid standing in one position for more than 10 minutes. Avoid setting with legs down. Do not cross legs while sitting. Off-Loading:     Frequent position changes. Do not cross legs while sitting. Shift weight every 20 minutes or more when sitting for prolonged periods of time.

## 2019-07-08 NOTE — WOUND CARE
07/08/19 1639   Wound Hip Left 05/30/19   Date First Assessed/Time First Assessed: 05/30/19 1623   Present on Hospital Admission: Yes  Wound Approximate Age at First Assessment (Weeks): 14 weeks  Primary Wound Type: Open incision/surgical site  Location: Hip  Wound Location Orientation: Left . .. Dressing Status Reinforced   Dressing Type Absorptive   Incision Site Well Approximated No   Non-staged Wound Description Full thickness   Shape irregular   Wound Length (cm) 3 cm   Wound Width (cm) 0.6 cm   Wound Depth (cm) 0.8 cm   Wound Volume (cm^3) 1.44 cm^3   Condition of Base Granulation;Slough   Condition of Edges Open;Rolled/curled   Assessment Intact   Drainage Amount Moderate   Drainage Color Serosanguinous; Tan   Wound Odor None   Aurelia-wound Assessment Edema   Margins Epibole (rolled edges)   Cleansing and Cleansing Agents  Dermal wound cleanser; Soap and water   Dressing Changed Changed/New   Dressing Type Applied Other (Comment)  (Mar[place] to [employer], Isabel, bacitracin, Mepilex border, Hypafix,)   Wound Procedure Type Debridement- Surgical   Consent Obtained  Yes   Procedure Bleeding Minimal   Procedure Hemostasis  Pressure   Procedure Instrument  Curette   Procedure Pain Scale Numeric 0/10   Debridement Procedure Performed by Dr. Mara Mondragon   Post-Procedure Length (cm) 3 cm   Post-Procedure Width (cm) 0.7 cm   Post-Procedure Depth (cm) 1 cm   Post-Procedure Volume (cm^3) 2.1 cm^3   Post-Procedure Surface Area (cm^2) 2.1 cm^2   Procedure Tolerated Well       Post-debridement photo:

## 2019-07-11 ENCOUNTER — HOSPITAL ENCOUNTER (OUTPATIENT)
Dept: WOUND CARE | Age: 59
Discharge: HOME | End: 2019-07-11
Attending: FAMILY MEDICINE
Payer: COMMERCIAL

## 2019-07-11 VITALS
DIASTOLIC BLOOD PRESSURE: 64 MMHG | TEMPERATURE: 98.7 F | HEART RATE: 88 BPM | OXYGEN SATURATION: 99 % | SYSTOLIC BLOOD PRESSURE: 152 MMHG | RESPIRATION RATE: 20 BRPM

## 2019-07-11 PROCEDURE — 99211 OFF/OP EST MAY X REQ PHY/QHP: CPT

## 2019-07-11 NOTE — PROGRESS NOTES
Patient presents to wound clinic for: Elisa Dominguez is a 64 y.o. male whom presents for follow up of his post op wound. He has had almost no drainage this week. He has no new complaints. He is going to PT. He is more active around the house. Review of Systems:    Gen: No fever, chills, malaise, weight loss/gain. GI: No nausea, vomiting, diarrhea, constipation, melena or hematochezia. Derm: see below  Musc/skeletal: no bone or joint complains. Vasc: No edema, cyanosis or claudication. Exam:   Visit Vitals  /78 (BP 1 Location: Left arm, BP Patient Position: At rest;Sitting)   Pulse 82   Temp 98.6 °F (37 °C)   Resp 17   SpO2 97%     GEN: Obese middle aged male in NAD. Neuro: A+OX4, motor grossly intact. Wound Description:     07/08/19 1639    Wound Hip Left 05/30/19   Date First Assessed/Time First Assessed: 05/30/19 1623   Present on Hospital Admission: Yes  Wound Approximate Age at First Assessment (Weeks): 14 weeks  Primary Wound Type: Open incision/surgical site  Location: Hip  Wound Location Orientation: Left . .. Dressing Status Reinforced   Dressing Type Absorptive   Incision Site Well Approximated No   Non-staged Wound Description Full thickness   Shape irregular   Wound Length (cm) 3 cm   Wound Width (cm) 0.6 cm   Wound Depth (cm) 0.8 cm   Wound Volume (cm^3) 1.44 cm^3   Condition of Base Granulation;Slough   Condition of Edges Open;Rolled/curled   Assessment Intact   Drainage Amount Moderate   Drainage Color Serosanguinous; Tan   Wound Odor None   Aurelia-wound Assessment Edema   Margins Epibole (rolled edges)       Debridement: required today to promote wound healing   Wound Procedure Type Debridement- Surgical   Consent Obtained  Yes   Procedure Bleeding Minimal   Procedure Hemostasis  Pressure   Procedure Instrument  Curette   Procedure Pain Scale Numeric 0/10   Debridement Procedure Performed by Dr. Joe Cotton   Post-Procedure Length (cm) 3 cm   Post-Procedure Width (cm) 0.7 cm   Post-Procedure Depth (cm) 1 cm   Post-Procedure Volume (cm^3) 2.1 cm^3   Post-Procedure Surface Area (cm^2) 2.1 cm^2   Procedure Tolerated Well     Tissue removed: devitalized tissue, dermis, epidermis, subcutaneous. Assessment:    Patient Active Problem List   Diagnosis Code    High cholesterol E78.00    HTN (hypertension) I10    Osteoarthritis of left hip M16.12    Postoperative wound infection of left hip T81.49XA    Open wound of left hip S71.002A    Prosthetic joint infection of left hip ([address] T84.52XA     The wound looks better every week. It is smaller and more shallow. Will continue with the following. Plan: 1. Serial Debridements. 2. Dressing changes:  Cleansing and Cleansing Agents  Dermal wound cleanser; Soap and water   Dressing Changed Changed/New   Dressing Type Applied Other (Comment)  (Marathon to [employer], Isabel, bacitracin, Mepilex border, Hypafix,)     3. RTC in 1 week.      Renée Santiago MD

## 2019-07-18 ENCOUNTER — HOSPITAL ENCOUNTER (OUTPATIENT)
Dept: WOUND CARE | Age: 59
Discharge: HOME | End: 2019-07-18
Attending: FAMILY MEDICINE
Payer: COMMERCIAL

## 2019-07-18 VITALS
OXYGEN SATURATION: 99 % | RESPIRATION RATE: 20 BRPM | SYSTOLIC BLOOD PRESSURE: 144 MMHG | HEART RATE: 82 BPM | TEMPERATURE: 98.5 F | DIASTOLIC BLOOD PRESSURE: 68 MMHG

## 2019-07-18 PROCEDURE — 11042 DBRDMT SUBQ TIS 1ST 20SQCM/<: CPT

## 2019-07-18 NOTE — PROGRESS NOTES
Patient presents to wound clinic for: Nishi Glez is a 64 y.o. male whom presents follow up of his post op wound. He is doing well. He is more active. There has been no drainage this week. Review of Systems:    Gen: No fever, chills, malaise, weight loss/gain. GI: No nausea, vomiting, diarrhea, constipation, melena or hematochezia. Derm: see below  Musc/skeletal: no bone or joint complains. Vasc: No edema, cyanosis or claudication. Exam:   Visit Vitals  /68 (BP 1 Location: Left arm, BP Patient Position: At rest)   Pulse 82   Temp 98.5 °F (36.9 °C)   Resp 20   SpO2 99%     GEN: Obese middle aged male  Neuro: A+OX4, Gross motor intact. Wound Description:     07/18/19 1036    Wound Hip Left 05/30/19   Date First Assessed/Time First Assessed: 05/30/19 1623   Present on Hospital Admission: Yes  Wound Approximate Age at First Assessment (Weeks): 14 weeks  Primary Wound Type: Open incision/surgical site  Location: Hip  Wound Location Orientation: Left . .. Dressing Status Old drainage; Reinforced   Dressing Type Absorptive   Incision Site Well Approximated No   Non-staged Wound Description Full thickness   Wound Length (cm) 2.5 cm   Wound Width (cm) 0.5 cm   Wound Depth (cm) 0.5 cm   Wound Volume (cm^3) 0.62 cm^3   Condition of Base Granulation;Slough   Condition of Edges Open   Assessment Pink   Drainage Amount Moderate   Drainage Color Serosanguinous   Wound Odor None   Aurelia-wound Assessment Blanchable erythema   Margins Defined edges                 Debridement: required today to promote wound healing   Wound Procedure Type Debridement- Surgical   Procedure Time Out    (yes)   Consent Obtained  Yes   Procedure Bleeding Minimal   Procedure Hemostasis  Pressure   Procedure Instrument  Curette   Procedure Pain Scale Numeric 0/10   Debridement Procedure Performed by Dr. Christian Bronson   Post-Procedure Length (cm) 2.5 cm   Post-Procedure Width (cm) 0.6 cm   Post-Procedure Depth (cm) 0.8 cm Post-Procedure Volume (cm^3) 1.2 cm^3   Post-Procedure Surface Area (cm^2) 1.5 cm^2   Procedure Tolerated Well     Tissue removed: Devitalized, dermis, epidermis, subcutaneous    Infection: No    Assessment:    Patient Active Problem List   Diagnosis Code    High cholesterol E78.00    HTN (hypertension) I10    Osteoarthritis of left hip M16.12    Postoperative wound infection of left hip T81.49XA    Open wound of left hip S71.002A    Prosthetic joint infection of left hip ([address] T84.52XA     The wound depth has improved. We are making good progress. Plan: 1. Serial debridements. 2. Dressings  Cleansing and Cleansing Agents  Dermal wound cleanser   Dressing Changed Changed/New   Dressing Type Applied    (ita,calcium alginate,mepilex border,tape,drape)     3. RTC in 1 week.        Radha Da Silva MD

## 2019-07-18 NOTE — WOUND CARE
07/18/19 1036   Wound Hip Left 05/30/19   Date First Assessed/Time First Assessed: 05/30/19 1623   Present on Hospital Admission: Yes  Wound Approximate Age at First Assessment (Weeks): 14 weeks  Primary Wound Type: Open incision/surgical site  Location: Hip  Wound Location Orientation: Left . .. Dressing Status Old drainage; Reinforced   Dressing Type Absorptive   Incision Site Well Approximated No   Non-staged Wound Description Full thickness   Wound Length (cm) 2.5 cm   Wound Width (cm) 0.5 cm   Wound Depth (cm) 0.5 cm   Wound Volume (cm^3) 0.62 cm^3   Condition of Base Granulation;Slough   Condition of Edges Open   Assessment Pink   Drainage Amount Moderate   Drainage Color Serosanguinous   Wound Odor None   Aurelia-wound Assessment Blanchable erythema   Margins Defined edges   Cleansing and Cleansing Agents  Dermal wound cleanser   Dressing Changed Changed/New   Dressing Type Applied   (ita,calcium alginate,mepilex border,tape,drape)   Wound Procedure Type Debridement- Surgical   Procedure Time Out   (yes)   Consent Obtained  Yes   Procedure Bleeding Minimal   Procedure Hemostasis  Pressure   Procedure Instrument  Curette   Procedure Pain Scale Numeric 0/10   Debridement Procedure Performed by Dr. Radha Da Silva   Post-Procedure Length (cm) 2.5 cm   Post-Procedure Width (cm) 0.6 cm   Post-Procedure Depth (cm) 0.8 cm   Post-Procedure Volume (cm^3) 1.2 cm^3   Post-Procedure Surface Area (cm^2) 1.5 cm^2   Procedure Tolerated Well

## 2019-07-24 ENCOUNTER — HOSPITAL ENCOUNTER (OUTPATIENT)
Dept: WOUND CARE | Age: 59
Discharge: HOME | End: 2019-07-24
Attending: FAMILY MEDICINE
Payer: COMMERCIAL

## 2019-07-24 VITALS
TEMPERATURE: 98.5 F | RESPIRATION RATE: 18 BRPM | SYSTOLIC BLOOD PRESSURE: 149 MMHG | DIASTOLIC BLOOD PRESSURE: 61 MMHG | OXYGEN SATURATION: 99 %

## 2019-07-24 PROCEDURE — 99211 OFF/OP EST MAY X REQ PHY/QHP: CPT

## 2019-07-25 ENCOUNTER — APPOINTMENT (OUTPATIENT)
Dept: WOUND CARE | Age: 59
End: 2019-07-25
Attending: FAMILY MEDICINE
Payer: COMMERCIAL

## 2019-08-01 ENCOUNTER — HOSPITAL ENCOUNTER (OUTPATIENT)
Dept: WOUND CARE | Age: 59
Discharge: HOME | End: 2019-08-01
Attending: FAMILY MEDICINE
Payer: COMMERCIAL

## 2019-08-01 VITALS
DIASTOLIC BLOOD PRESSURE: 72 MMHG | RESPIRATION RATE: 16 BRPM | TEMPERATURE: 98.8 F | OXYGEN SATURATION: 100 % | SYSTOLIC BLOOD PRESSURE: 143 MMHG | HEART RATE: 78 BPM

## 2019-08-01 PROCEDURE — 11042 DBRDMT SUBQ TIS 1ST 20SQCM/<: CPT

## 2019-08-01 NOTE — PROGRESS NOTES
Patient presents to wound clinic for: Fely Collazo is a 64 y.o. male whom presents follow up of his post op wound. He is doing well. He is more active. He was able to pass his PT test at work. There has been no drainage this week.          Review of Systems:     Gen: No fever, chills, malaise, weight loss/gain. GI: No nausea, vomiting, diarrhea, constipation, melena or hematochezia. Derm: see below  Musc/skeletal: no bone or joint complains. Vasc: No edema, cyanosis or claudication.      Exam:   Visit Vitals  /68 (BP 1 Location: Left arm, BP Patient Position: At rest)   Pulse 82   Temp 98.5 °F (36.9 °C)   Resp 20   SpO2 99%      GEN: Obese middle aged male  Neuro: A+OX4, Gross motor intact.      Wound Description:      08/01/19 1359    Wound Hip Left 05/30/19   Date First Assessed/Time First Assessed: 05/30/19 1623   Present on Hospital Admission: Yes  Wound Approximate Age at First Assessment (Weeks): 14 weeks  Primary Wound Type: Open incision/surgical site  Location: Hip  Wound Location Orientation: Left . ..    Dressing Status Old drainage   Dressing Type Absorptive   Incision Site Well Approximated No   Non-staged Wound Description Full thickness   Wound Length (cm) 1.5 cm   Wound Width (cm) 0.3 cm   Wound Depth (cm) 0.3 cm   Wound Volume (cm^3) 0.14 cm^3   Condition of Base Granulation   Condition of Edges Open   Assessment Pink   Drainage Amount Moderate   Drainage Color Serosanguinous   Wound Odor None   Aurelia-wound Assessment Blanchable erythema   Margins Defined edges     Debridement: required today to promote wound healing   Wound Procedure Type Debridement- Surgical   Procedure Time Out    (yes)   Consent Obtained  Yes   Procedure Bleeding Minimal   Procedure Hemostasis  Pressure   Procedure Instrument  Curette   Procedure Pain Scale Numeric 0/10   Debridement Procedure Performed by Dr. Yuki Vidal   Post-Procedure Length (cm) 1.5 cm   Post-Procedure Width (cm) 0.4 cm   Post-Procedure Depth (cm) 0.4 cm   Post-Procedure Volume (cm^3) 0.24 cm^3   Post-Procedure Surface Area (cm^2) 0.6 cm^2       Procedure Tolerated Well             Tissue removed: Devitalized, dermis, epidermis, subcutaneous     Infection: No     Assessment:          Patient Active Problem List   Diagnosis Code    High cholesterol E78.00    HTN (hypertension) I10    Osteoarthritis of left hip M16.12    Postoperative wound infection of left hip T81.49XA    Open wound of left hip S71.002A    Prosthetic joint infection of left hip ([address] T84.52XA      The wound depth has improved a lot since his last visit. We are making good progress.       Plan: 1. Serial debridements. 2. Dressings  Cleansing and Cleansing Agents  Dermal wound cleanser   Dressing Changed Changed/New   Dressing Type Applied    (ita,calcium alginate,mepilex border,tape,drape)      3.  RTC in 1 week.         Nigel Edgar MD

## 2019-08-01 NOTE — WOUND CARE
08/01/19 1359   Wound Hip Left 05/30/19   Date First Assessed/Time First Assessed: 05/30/19 1623   Present on Hospital Admission: Yes  Wound Approximate Age at First Assessment (Weeks): 14 weeks  Primary Wound Type: Open incision/surgical site  Location: Hip  Wound Location Orientation: Left . ..    Dressing Status Old drainage   Dressing Type Absorptive   Incision Site Well Approximated No   Non-staged Wound Description Full thickness   Wound Length (cm) 1.5 cm   Wound Width (cm) 0.3 cm   Wound Depth (cm) 0.3 cm   Wound Volume (cm^3) 0.14 cm^3   Condition of Base Granulation   Condition of Edges Open   Assessment Pink   Drainage Amount Moderate   Drainage Color Serosanguinous   Wound Odor None   Aurelia-wound Assessment Blanchable erythema   Margins Defined edges   Cleansing and Cleansing Agents  Dermal wound cleanser   Dressing Changed Changed/New   Dressing Type Applied   (ita,bacitracin,calcium alginate,mepilex border,drape)   Wound Procedure Type Debridement- Surgical   Procedure Time Out   (yes)   Consent Obtained  Yes   Procedure Bleeding Minimal   Procedure Hemostasis  Pressure   Procedure Instrument  Curette   Procedure Pain Scale Numeric 0/10   Debridement Procedure Performed by Dr. Nara Townsend   Post-Procedure Length (cm) 1.5 cm   Post-Procedure Width (cm) 0.4 cm   Post-Procedure Depth (cm) 0.4 cm   Post-Procedure Volume (cm^3) 0.24 cm^3   Post-Procedure Surface Area (cm^2) 0.6 cm^2   Assisted Physician in Procedure  Yes   Procedure Tolerated Well

## 2019-08-01 NOTE — DISCHARGE INSTRUCTIONS
Home Care Instructions  Wound Cleansing & Dressings  Remove: old dressing if soiled    Clean wound with Normal Saline/Dermal Wound Cleanser:      Cover with: dry dressing,border and tape with drape      Change Dressing:as needed to keep dry    Return Appointment: 7 days    PLEASE CONTACT OFFICE AS SOON AS POSSIBLE IF UNABLE TO MAKE THIS APPOINTMENT. Inspect your wounds, looking for signs of infection which may include the following:  Increase in redness  Red \"streaks\" from wound  Increase in swelling  Fever  Unusual odor  Change in the amount of wound drainage. Should you experience any significant changes in your wound(s) or have any questions regarding your home care instructions please contact the wound center or your home health company. If after regular business hours, please call your family doctor or local emergency room. Edema Control:   Elevate legs as much as possible. Avoid standing in one position for more than 10 minutes. Avoid setting with legs down. Do not cross legs while sitting. Off-Loading:     Frequent position changes. Do not cross legs while sitting. Shift weight every 20 minutes or more when sitting for prolonged periods of time.     Miscellaneous Orders:       Do not shower Diabetic Foot Care Blood Sugar Control  Stop Smoking    Diet: Low Sodium Diet Increase Protein Intake  Supplement: ? Boost, Ensure, Doe

## 2019-08-05 ENCOUNTER — HOSPITAL ENCOUNTER (OUTPATIENT)
Dept: WOUND CARE | Age: 59
Discharge: HOME | End: 2019-08-05
Payer: COMMERCIAL

## 2019-08-05 VITALS
RESPIRATION RATE: 16 BRPM | OXYGEN SATURATION: 99 % | SYSTOLIC BLOOD PRESSURE: 125 MMHG | TEMPERATURE: 97.7 F | DIASTOLIC BLOOD PRESSURE: 70 MMHG | HEART RATE: 68 BPM

## 2019-08-05 PROCEDURE — 11042 DBRDMT SUBQ TIS 1ST 20SQCM/<: CPT

## 2019-08-05 NOTE — WOUND CARE
08/05/19 1509   Wound Hip Left 05/30/19   Date First Assessed/Time First Assessed: 05/30/19 1623   Present on Hospital Admission: Yes  Wound Approximate Age at First Assessment (Weeks): 14 weeks  Primary Wound Type: Open incision/surgical site  Location: Hip  Wound Location Orientation: Left . .. Dressing Status Clean, dry, and intact   Dressing Type Absorptive   Incision Site Well Approximated No   Non-staged Wound Description Full thickness   Wound Length (cm) 2 cm   Wound Width (cm) 0.3 cm   Wound Depth (cm) 0.2 cm   Wound Volume (cm^3) 0.12 cm^3   Condition of Base Slough   Condition of Edges Open   Drainage Amount Small   Drainage Color Serous; Serosanguinous   Wound Odor None   Aurelia-wound Assessment Blanchable erythema   Margins Defined edges   Cleansing and Cleansing Agents  Dermal wound cleanser   Dressing Changed Changed/New   Dressing Type Applied Absorptive   Wound Procedure Type Debridement- Surgical   Procedure Time Out 1440   Consent Obtained  Yes   Procedure Bleeding Minimal   Procedure Hemostasis  Pressure   Procedure Instrument  Curette   Procedure Pain Scale Numeric 0/10   Debridement Procedure Performed by Dr Ochoa Records   Post-Procedure Length (cm) 2 cm   Post-Procedure Width (cm) 0.3 cm   Post-Procedure Depth (cm) 0.4 cm   Post-Procedure Volume (cm^3) 0.24 cm^3   Post-Procedure Surface Area (cm^2) 0.6 cm^2   Procedure Tolerated Well

## 2019-08-05 NOTE — DISCHARGE INSTRUCTIONS
Discharge Instructions for  [address]  [address], [address] [facility]  [phone] Fax [phone]    NAME:  Aamir Flores  YOB: 1960  MEDICAL RECORD NUMBER:  [id number]  DATE:  8/5/2019    Wound Cleansing:   Do not scrub or use excessive force. Cleanse wound prior to applying a clean dressing with:  [] Normal Saline [] Keep Wound Dry in Shower    [x] Wound Cleanser   [] Cleanse wound with Mild Soap & Water  [] May Shower at Discharge   [] Other:         Dressings:           Wound Location Left Hip   [x] Apply Primary Dressing:       [] MediHoney Gel [] Alginate with Silver [] Alginate   [x] Collagen [] Collagen with Silver   [] Santyl with Moisten saline gauze     [] Hydrocolloid   [] MediHoney Alginate [] Foam with Silver   [] Foam   [] Hydrofera Blue    [x] Mepilex Border    [] Moisten with Saline [] Hydrogel [] Mepitel     [] Bactroban/Mupirocin [] Polysporin  [] Other:       [x] Cover and Secure with:     [] Gauze [] Yany [] Kerlix   [] Ace Wrap [] Cover Roll Tape [] ABD     [] Other: Drape Avoid contact of tape with skin. [] Change dressing: [] Daily    [] Every Other Day [] Three times per week   [x] Once a week [] Do Not Change Dressing    [] Other:      Pressure Relief:  [x] When sitting, shift position or do seat lifts every 15 minutes.  [] Wheelchair cushion [] Specialty Bed/Mattress  [x] Turn every 2 hours when in bed. Avoid position directing pressure on wound site. Limit side lying to 30 degree tilt. Limit HOB elevation to 30 degrees.        Dietary:  [x] Diet as tolerated: [] Calorie Diabetic Diet: [] No Added Salt:  [] Increase Protein: [] Other:   Activity:  [] Activity as tolerated:  [] Patient has no activity restrictions     [] Strict Bedrest: [] Remain off Work:     [x] May return to full duty work:                                   [] Return to work with restrictions:             Return Appointment:  [] Wound and dressing supply provider:   [] ECF or Home Healthcare:  [] Wound Assessment: [] Physician or NP scheduled for Wound Assessment:   [] Return Appointment: With Dr. Maryanne Loza in [address] [place] [address])  [] Ordered tests:      Electronically signed Kat Rai RN on 8/5/2019 at 3:20 PM     Eliza Mu[address]oz 281: Should you experience any significant changes in your wound(s) or have questions about your wound care, please contact the [facility] Main at [address] [place] [address] 8:00 am - 4:30. If you need help with your wound outside these hours and cannot wait until we are again available, contact your PCP or go to the hospital emergency room. PLEASE NOTE: IF YOU ARE UNABLE TO OBTAIN WOUND SUPPLIES, CONTINUE TO USE THE SUPPLIES YOU HAVE AVAILABLE UNTIL YOU ARE ABLE TO REACH US. IT IS MOST IMPORTANT TO KEEP THE WOUND COVERED AT ALL TIMES.      Physician Signature:_______________________    Date: ___________ Time:  ____________

## 2019-08-08 NOTE — PROGRESS NOTES
Patient presents to wound clinic for: Que Baptiste is a 64 y.o. male whom presents follow up of his post op wound. He is doing well. He is more active. There has been no drainage this week.          Review of Systems:     Gen: No fever, chills, malaise, weight loss/gain. GI: No nausea, vomiting, diarrhea, constipation, melena or hematochezia. Derm: see below  Musc/skeletal: no bone or joint complains. Vasc: No edema, cyanosis or claudication.      Exam:   Visit Vitals  /70 (BP 1 Location: Left arm, BP Patient Position: At rest)   Pulse 68   Temp 97.7 °F (36.5 °C)   Resp 16   SpO2 99%          GEN: Obese middle aged male  Neuro: A+OX4, Gross motor intact.      Wound Description:      08/05/19 1509    Wound Hip Left 05/30/19   Date First Assessed/Time First Assessed: 05/30/19 1623   Present on Hospital Admission: Yes  Wound Approximate Age at First Assessment (Weeks): 14 weeks  Primary Wound Type: Open incision/surgical site  Location: Hip  Wound Location Orientation: Left . .. Dressing Status Clean, dry, and intact   Dressing Type Absorptive   Incision Site Well Approximated No   Non-staged Wound Description Full thickness   Wound Length (cm) 2 cm   Wound Width (cm) 0.3 cm   Wound Depth (cm) 0.2 cm   Wound Volume (cm^3) 0.12 cm^3   Condition of Base Slough   Condition of Edges Open   Drainage Amount Small   Drainage Color Serous; Serosanguinous   Wound Odor None   Aurelia-wound Assessment Blanchable erythema   Margins Defined edges   Cleansing and Cleansing Agents  Dermal wound cleanser   Dressing Changed Changed/New   Dressing Type Applied Absorptive     Debridement: Necessary to promote wound healing.       Wound Procedure Type Debridement- Surgical   Procedure Time Out 1440   Consent Obtained  Yes   Procedure Bleeding Minimal   Procedure Hemostasis  Pressure   Procedure Instrument  Curette   Procedure Pain Scale Numeric 0/10   Debridement Procedure Performed by Dr Willian Vieira   Post-Procedure Length (cm) 2 cm   Post-Procedure Width (cm) 0.3 cm   Post-Procedure Depth (cm) 0.4 cm   Post-Procedure Volume (cm^3) 0.24 cm^3   Post-Procedure Surface Area (cm^2) 0.6 cm^2   Procedure Tolerated Well       Tissue removed: Devitalized, dermis, epidermis, subcutaneous     Infection: No     Assessment:          Patient Active Problem List   Diagnosis Code    High cholesterol E78.00    HTN (hypertension) I10    Osteoarthritis of left hip M16.12    Postoperative wound infection of left hip T81.49XA    Open wound of left hip S71.002A    Prosthetic joint infection of left hip ([address] T84.52XA      The wound depth has improved a lot since his last visit. We are making good progress.       Plan: 1. Serial debridements. 2. Dressings  Cleansing and Cleansing Agents  Dermal wound cleanser   Dressing Changed Changed/New   Dressing Type Applied    (ita,calcium alginate,mepilex border,tape,drape)      3.  RTC in 1 week.         Solange Hallman MD

## 2019-08-12 ENCOUNTER — HOSPITAL ENCOUNTER (OUTPATIENT)
Dept: WOUND CARE | Age: 59
Discharge: HOME | End: 2019-08-12
Payer: COMMERCIAL

## 2019-08-12 VITALS
HEART RATE: 70 BPM | RESPIRATION RATE: 16 BRPM | SYSTOLIC BLOOD PRESSURE: 120 MMHG | DIASTOLIC BLOOD PRESSURE: 70 MMHG | OXYGEN SATURATION: 100 % | TEMPERATURE: 98 F

## 2019-08-12 PROCEDURE — 99213 OFFICE O/P EST LOW 20 MIN: CPT

## 2019-08-12 NOTE — WOUND CARE
08/12/19 1428   Wound Hip Left 05/30/19   Date First Assessed/Time First Assessed: 05/30/19 1623   Present on Hospital Admission: Yes  Wound Approximate Age at First Assessment (Weeks): 14 weeks  Primary Wound Type: Open incision/surgical site  Location: Hip  Wound Location Orientation: Left . .. Dressing Status Clean, dry, and intact   Dressing Type Absorptive   Incision Site Well Approximated No   Non-staged Wound Description Full thickness   Wound Length (cm) 1 cm   Wound Width (cm) 0.2 cm   Wound Depth (cm) 0.1 cm   Wound Volume (cm^3) 0.02 cm^3   Condition of Base Other (comment)  (fibrin)   Condition of Edges Closed   Assessment Pink   Drainage Amount Scant   Drainage Color Serous   Wound Odor None   Aurelia-wound Assessment Intact;Dry   Margins Defined edges   Cleansing and Cleansing Agents  Normal saline   Dressing Changed Changed/New   Dressing Type Applied Collagens/cell matrix; Silicone   Procedure Tolerated Well

## 2019-08-12 NOTE — DISCHARGE INSTRUCTIONS
Discharge Instructions for  [address]  [address], [place], [facility]  [phone] Fax [phone]    NAME:  Shana Carbajal  YOB: 1960  MEDICAL RECORD NUMBER:  [id number]  DATE:  8/12/2019    Wound Cleansing:   Do not scrub or use excessive force. Cleanse wound prior to applying a clean dressing with:  [x] Normal Saline [x] Keep Wound Dry in Shower    [] Wound Cleanser   [x] Cleanse wound with Mild Soap & Water  [] May Shower at Discharge   [] Other:            Dressings:           Wound Location Left Hip     [x] Cover and Secure with:     [] Gauze [] Yany [] Kerlix   [] Ace Wrap [] Cover Roll Tape [x] ABD     [] Other: Drape Avoid contact of tape with skin. [] Change dressing: [] Daily    [] Every Other Day [] Three times per week   [] Once a week [] Do Not Change Dressing   [x] Other:If needed only    Dietary:  [] Diet as tolerated: [x] Calorie Diabetic Diet: [] No Added Salt:  [] Increase Protein: [] Other:   Activity:  [x] Activity as tolerated:  [] Patient has no activity restrictions     [] Strict Bedrest: [] Remain off Work:     [] May return to full duty work:                                   [] Return to work with restrictions:             Return Appointment:  [] Wound and dressing supply provider:   [] ECF or Home Healthcare:  [x] Wound Assessment: [] Physician or NP scheduled for Wound Assessment:   [] Return Appointment: With 1  Week(s)  [] Ordered tests:      Electronically signed Simone Ghosh RN on 8/12/2019 at 2:34 PM     Eliza Muñoz 281: Should you experience any significant changes in your wound(s) or have questions about your wound care, please contact the [facility] Main at [address] [place] [address] 8:00 am - 4:30. If you need help with your wound outside these hours and cannot wait until we are again available, contact your PCP or go to the hospital emergency room.      PLEASE NOTE: IF YOU ARE UNABLE TO OBTAIN WOUND SUPPLIES, CONTINUE TO USE THE SUPPLIES YOU HAVE AVAILABLE UNTIL YOU ARE ABLE TO REACH US. IT IS MOST IMPORTANT TO KEEP THE WOUND COVERED AT ALL TIMES.      Physician Signature:_______________________    Date: ___________ Time:  ____________

## 2019-08-19 ENCOUNTER — HOSPITAL ENCOUNTER (OUTPATIENT)
Dept: WOUND CARE | Age: 59
Discharge: HOME | End: 2019-08-19
Payer: COMMERCIAL

## 2019-08-19 VITALS
HEART RATE: 70 BPM | RESPIRATION RATE: 16 BRPM | DIASTOLIC BLOOD PRESSURE: 94 MMHG | SYSTOLIC BLOOD PRESSURE: 140 MMHG | TEMPERATURE: 98.3 F | OXYGEN SATURATION: 100 %

## 2019-08-19 PROCEDURE — 99211 OFF/OP EST MAY X REQ PHY/QHP: CPT

## 2019-08-19 NOTE — WOUND CARE
08/19/19 0933   Wound Hip Left 05/30/19   Date First Assessed/Time First Assessed: 05/30/19 1623   Present on Hospital Admission: Yes  Wound Approximate Age at First Assessment (Weeks): 14 weeks  Primary Wound Type: Open incision/surgical site  Location: Hip  Wound Location Orientation: Left . .. Dressing Status Clean, dry, and intact   Dressing Type Silicone   Incision Site Well Approximated No   Non-staged Wound Description Full thickness   Wound Length (cm) 0.5 cm   Wound Width (cm) 0.2 cm   Wound Depth (cm) 0.1 cm   Wound Volume (cm^3) 0.01 cm^3   Condition of Base Pink   Condition of Edges Rolled/curled   Assessment Pink   Drainage Amount Scant   Drainage Color Serosanguinous   Wound Odor None   Aurelia-wound Assessment Intact   Margins Epibole (rolled edges)   Cleansing and Cleansing Agents  Normal saline   Dressing Changed Changed/New   Dressing Type Applied Collagens/cell matrix; Silicone   Procedure Tolerated Well

## 2019-08-19 NOTE — DISCHARGE INSTRUCTIONS
Discharge Instructions for  Yessenia Pretty  [address], [place], [address] [facility]  [phone] Fax [phone]    NAME:  Sumanth Norris  YOB: 1960  MEDICAL RECORD NUMBER:  [id number]  DATE:  8/19/2019    Wound Cleansing:   Do not scrub or use excessive force. Cleanse wound prior to applying a clean dressing with:  [] Normal Saline [] Keep Wound Dry in Shower    [] Wound Cleanser   [x] Cleanse wound with Mild Soap & Water  [] May Shower at Discharge   [] Other:         Dressings:           Wound Location hip    [] Cover and Secure with:     [] Gauze [] Yany [] Kerlix   [] Ace Wrap [] Cover Roll Tape [] ABD     [x] Other: Band aid   Avoid contact of tape with skin. [] Change dressing: [] Daily    [x] Every Other Day [] Three times per week   [] Once a week [] Do Not Change Dressing   [] Other:      Dietary:  [x] Diet as tolerated: [] Calorie Diabetic Diet: [] No Added Salt:  [] Increase Protein: [] Other:   Activity:  [x] Activity as tolerated:  [] Patient has no activity restrictions     [] Strict Bedrest: [] Remain off Work:     [] May return to full duty work:                                   [] Return to work with restrictions:             Return Appointment:  [] Wound and dressing supply provider:   [] ECF or Home Healthcare:  [] Wound Assessment: [] Physician or NP scheduled for Wound Assessment:   [] Return Appointment: With Dr. Angela Cantu on 9/4/19   And Dr. Manish Crespo on 9/8/19  [] Ordered tests:      Electronically signed James Vazquez RN on 8/19/2019 at 9:40 AM     [address]: Should you experience any significant changes in your wound(s) or have questions about your wound care, please contact the [facility] at [address] [place] [address] 8:00 am - 4:30. If you need help with your wound outside these hours and cannot wait until we are again available, contact your PCP or go to the hospital emergency room. PLEASE NOTE: IF YOU ARE UNABLE TO OBTAIN WOUND SUPPLIES, CONTINUE TO USE THE SUPPLIES YOU HAVE AVAILABLE UNTIL YOU ARE ABLE TO REACH US. IT IS MOST IMPORTANT TO KEEP THE WOUND COVERED AT ALL TIMES. Physician Signature:_______________________    Date: ___________ Time:  ____________  Discharge Instructions for  [address] [place] [address]  [phone] Fax [phone]    NAME:  Alena Arrington  YOB: 1960  MEDICAL RECORD NUMBER:  [id number]  DATE:  8/19/2019    Wound Cleansing:   Do not scrub or use excessive force. Cleanse wound prior to applying a clean dressing with:  [] Normal Saline [] Keep Wound Dry in Shower    [] Wound Cleanser   [] Cleanse wound with Mild Soap & Water  [] May Shower at Discharge   [] Other:      Topical Treatments:  Do not apply lotions, creams, or ointments to wound bed unless directed. [] Apply moisturizing lotion to skin surrounding the wound prior to dressing change.  [] Apply antifungal ointment to skin surrounding the wound prior to dressing change.  [] Apply thin film of moisture barrier ointment to skin immediately around wound. [] Other:       Dressings:           Wound Location ***   [] Apply Primary Dressing:       [] MediHoney Gel [] Alginate with Silver [] Alginate   [] Collagen [] Collagen with Silver   [] Santyl with Moisten saline gauze     [] Hydrocolloid   [] MediHoney Alginate [] Foam with Silver   [] Foam   [] Hydrofera Blue    [] Mepilex Border    [] Moisten with Saline [] Hydrogel [] Mepitel     [] Bactroban/Mupirocin [] Polysporin  [] Other:    [] Pack wound loosely with  [] Iodoform   [] Plain Packing  [] Other   [] Cover and Secure with:     [] Gauze [] Yany [] Kerlix   [] Ace Wrap [] Cover Roll Tape [] ABD     [] Other:    Avoid contact of tape with skin.   [] Change dressing: [] Daily    [] Every Other Day [] Three times per week   [] Once a week [] Do Not Change Dressing   [] Other:    Dressings:           Wound Location ***    [] Apply Primary Dressing:       [] MediHoney Gel [] Alginate with Silver [] Alginate   [] Collagen [] Collagen with Silver   [] Santyl with Moisten saline gauze     [] Hydrocolloid   [] MediHoney Alginate [] Foam with Silver   [] Foam   [] Hydrofera Blue    [] Mepilex Border    [] Moisten with Saline [] Hydrogel [] Mepitel     [] Bactroban/Mupirocin [] Polysporin  [] Other:    [] Pack wound loosely with  [] Iodoform   [] Plain Packing  [] Other   [] Cover and Secure with:     [] Gauze [] Yany [] Kerlix   [] Ace Wrap [] Cover Roll Tape [] ABD     [] Other:    Avoid contact of tape with skin. [] Change dressing: [] Daily    [] Every Other Day [] Three times per week   [] Once a week [] Do Not Change Dressing   [] Other:     Negative Pressure:           Wound Location:   [] Pressure@           mm/Hg  []Continuous []Intermittent   [] Black  [] White Foam [] Other:   []Change dressing: []Three times per week    []Other:     Pressure Relief:  [] When sitting, shift position or do seat lifts every 15 minutes.  [] Wheelchair cushion [] Specialty Bed/Mattress  [] Turn every 2 hours when in bed. Avoid position directing pressure on wound site. Limit side lying to 30 degree tilt. Limit HOB elevation to 30 degrees. Edema Control:  Apply: [] Compression Stocking []Right Leg []Left Leg   [] Tubigrip []Right Leg Double Layer []Left Leg Double Layer       []Right Leg Single Layer []Left Leg Single Layer   [] SpandaGrip []Right Leg  []Left Leg      []Low compression 5-10 mm/Hg      []Medium compression 10-20 mm/Hg     []High compression  20-30 mm/Hg   every morning immediately when getting up should be applied to affected leg(s) from mid foot to knee making sure to cover the heel. Remove every night before going to bed. [] Elevate leg(s) above the level of the heart when sitting. [] Avoid prolonged standing in one place.    [] Elevate arm/hand above the level of the heart []RightArm []LeftArm     Compression:  Apply: [] Multilayer Compression Wrap Applied in Clinic []RightLeg []Left Leg   [] Multi-layer compression. Do not get leg(s) with wrap wet. If wraps become too tight call the center or completely remove the wrap. [] Elevate leg(s) above the level of the heart when sitting. [] Avoid prolonged standing in one place. Off-Loading:   [] Off-loading when [] walking  [] in bed [] sitting  [] Total non-weight bearing  [] Right Leg  [] Left Leg   [] Assistive Device [] Walker [] Cane  [] Wheelchair  [] Crutches   [] Surgical shoe    [] Podus Boot(s)   [] Foam Boot(s)  [] Roll About    [] Cast Boot [] CROW Boot  [] Other:    Contact Cast:  Apply: [] Total Contact Cast Applied in Clinic []RightLeg []Left Leg   [] Do not get cast wet. Contact center or go to emergency room if there is a foul odor or becomes uncomfortable due to feeling tight or swelling. Do not use objects inside of cast to scratch. Dietary:  [] Diet as tolerated: [] Calorie Diabetic Diet: [] No Added Salt:  [] Increase Protein: [] Other:   Activity:  [] Activity as tolerated:  [] Patient has no activity restrictions     [] Strict Bedrest: [] Remain off Work:     [] May return to full duty work:                                   [] Return to work with restrictions:             Return Appointment:  [] Wound and dressing supply provider:   [] ECF or Home Healthcare:  [] Wound Assessment: [] Physician or NP scheduled for Wound Assessment:   [] Return Appointment: With ***  in  *** Week(s)  [] Ordered tests:      Electronically signed Josue Wiggins RN on 8/19/2019 at 9:40 AM     [address]: Should you experience any significant changes in your wound(s) or have questions about your wound care, please contact the [address] at [address] [place] [address] 8:00 am - 4:30.   If you need help with your wound outside these hours and cannot wait until we are again available, contact your PCP or go to the hospital emergency room. PLEASE NOTE: IF YOU ARE UNABLE TO OBTAIN WOUND SUPPLIES, CONTINUE TO USE THE SUPPLIES YOU HAVE AVAILABLE UNTIL YOU ARE ABLE TO REACH US. IT IS MOST IMPORTANT TO KEEP THE WOUND COVERED AT ALL TIMES.      Physician Signature:_______________________    Date: ___________ Time:  ____________

## 2019-09-04 ENCOUNTER — APPOINTMENT (OUTPATIENT)
Dept: WOUND CARE | Age: 59
End: 2019-09-04

## 2019-09-04 ENCOUNTER — HOSPITAL ENCOUNTER (OUTPATIENT)
Dept: WOUND CARE | Age: 59
Discharge: HOME | End: 2019-09-04
Attending: INTERNAL MEDICINE
Payer: COMMERCIAL

## 2019-09-04 VITALS
TEMPERATURE: 98 F | SYSTOLIC BLOOD PRESSURE: 130 MMHG | HEART RATE: 74 BPM | DIASTOLIC BLOOD PRESSURE: 82 MMHG | OXYGEN SATURATION: 98 %

## 2019-09-04 PROCEDURE — 99211 OFF/OP EST MAY X REQ PHY/QHP: CPT

## 2019-09-04 NOTE — WOUND CARE
[facility]                                               (A Division of [address] [place] [address])                             Follow-up Note      Date of Admission: 9/4/2019     Date of Note:  9/4/2019    Summary:    63 y/o CM Obese, HTN, Hyperchol, DM w/ L hip PJI s/p one stage exchange 5/22. Cx MSSA. Prior wd infection MSSA, CONS. Discharged 5/24 on Cefazolin/Vanco - nausea with Cefazolin changed to Ceftriaxone 5/29. Interval History:    Finishing last week of PO cephalexin. Never did tolerate the RIF. Feels great. Back to work as Paramedic/EMT. No issues         Current Antimicrobials: Prior Antimicrobials   cephelexin #63/90       Ceftriaxone 5/29  - 37    abx 43       Assessment Plan:   Prosthetic Joint Infection L hip  -  L direct ant KATHERINE 2/13/2019   - subsequent superficial wd infection I&D 3/13. Cx MSSA broth only, rare CONS.    - MRSA screen January, 5/22 negative.    - s/p 5/22 L hip I&D of hip replacement, periprosthetic infection,revision L hip replacement (new liner, femoral head). (one stage exchange)  - Cx few MSSA  - was not taking Rifampin for past month due to upset stomach (had informed me previously) -> He done. Labs look great/normal (in [facility]). No more f/u necessary from ID standpoint. Matter of fact, I think he can probably stop the PO now, but he wants to finish another week. Fine. 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  84 Cap    aspirin 81 mg chewable tablet Take 1 Tab by mouth two (2) times a day. 42 Tab    naloxone (NARCAN) 4 mg/actuation nasal spray Use 1 spray intranasally into 1 nostril. Use a new Narcan nasal spray for subsequent doses and administer into alternating nostrils. May repeat every 2 to 3 minutes as needed. 1 Each    gabapentin (NEURONTIN) 400 mg capsule Take 400 mg by mouth three (3) times daily. Indications: for the hip     amLODIPine (NORVASC) 5 mg tablet Take 5 mg by mouth daily (with breakfast). Indications: high blood pressure     dulaglutide (TRULICITY) 1.5 IM/2.1 mL sub-q pen 1.5 mg by SubCUTAneous route Every Saturday.  fexofenadine (ALLEGRA) 180 mg tablet Take 180 mg by mouth daily.  metFORMIN (GLUCOPHAGE) 1,000 mg tablet Take 1,000 mg by mouth two (2) times daily (with meals).  ergocalciferol (ERGOCALCIFEROL) 50,000 unit capsule Take 50,000 Units by mouth every Monday.  irbesartan (AVAPRO) 150 mg tablet Take 150 mg by mouth two (2) times a day.  atorvastatin (LIPITOR) 20 mg tablet Take 20 mg by mouth nightly. Monday, Tuesday, Wednesday, Thursday, Friday      metaxalone (SKELAXIN) 800 mg tablet Take 800 mg by mouth every six (6) hours as needed for Pain.  amLODIPine (NORVASC) 2.5 mg tablet Take 2.5 mg by mouth every evening.  carvedilol (COREG) 12.5 mg tablet Take 12.5 mg by mouth two (2) times a day.  esomeprazole (NEXIUM) 20 mg capsule Take 20 mg by mouth daily. No current facility-administered medications for this encounter.           Review of Systems - General ROS: negative for - chills, fatigue, fever or malaise  Respiratory ROS: negative for - cough  Cardiovascular ROS: negative for - chest pain or dyspnea on exertion  Gastrointestinal ROS: no abdominal pain, change in bowel habits, or black or bloody stools        Objective:          GEN: WDWN male         Lab results:    Chemistry  No results for input(s): GLU, NA, K, CL, CO2, BUN, CREA, CA, AGAP, BUCR, TBIL, GPT, AP, TP, ALB, GLOB, AGRAT in the last 72 hours. CBC w/ Diff  No results for input(s): WBC, RBC, HGB, HCT, PLT, GRANS, LYMPH, EOS, HGBEXT, HCTEXT, PLTEXT in the last 72 hours.     Microbiology  All Micro Results     None           Payal Watson MD [address] [phone]   9/4/2019   9:58 AM

## 2019-09-12 ENCOUNTER — HOSPITAL ENCOUNTER (OUTPATIENT)
Dept: WOUND CARE | Age: 59
Discharge: HOME | End: 2019-09-12
Attending: FAMILY MEDICINE
Payer: COMMERCIAL

## 2019-09-12 ENCOUNTER — HOSPITAL ENCOUNTER (OUTPATIENT)
Dept: WOUND CARE | Age: 59
End: 2019-09-12
Payer: COMMERCIAL

## 2019-09-12 VITALS
RESPIRATION RATE: 16 BRPM | DIASTOLIC BLOOD PRESSURE: 85 MMHG | SYSTOLIC BLOOD PRESSURE: 135 MMHG | TEMPERATURE: 98.3 F | HEART RATE: 70 BPM | OXYGEN SATURATION: 100 %

## 2019-09-12 PROCEDURE — 99212 OFFICE O/P EST SF 10 MIN: CPT

## 2019-09-12 NOTE — WOUND CARE
09/12/19 1154   [REMOVED] Wound Hip Left 05/30/19   Final Assessment Date/Final Assessment Time: 09/12/19 1120  Date First Assessed/Time First Assessed: 05/30/19 1623   Present on Hospital Admission: Yes  Wound Approximate Age at First Assessment (Weeks): 14 weeks  Primary Wound Type: Open incision/usman. .. Dressing Status Clean, dry, and intact   Dressing Type Adhesive wound dressing (Band-Aid)   Incision Site Well Approximated Yes   Non-staged Wound Description Full thickness   Drainage Amount None   Wound Odor None   Aurelia-wound Assessment Intact   Procedure Tolerated Well     Patient discharged from clinic at this time.

## 2019-09-19 NOTE — PROGRESS NOTES
Patient presents to wound clinic for: Adilene Ferrell is a 64 y.o. male whom presents follow up of his post op wound. He is doing well. He is more active. There has been no drainage this week.          Review of Systems:     Gen: No fever, chills, malaise, weight loss/gain. GI: No nausea, vomiting, diarrhea, constipation, melena or hematochezia. Derm: see below  Musc/skeletal: no bone or joint complains. Vasc: No edema, cyanosis or claudication.      Exam:   Visit Vitals  /85 (BP 1 Location: Left arm, BP Patient Position: At rest)   Pulse 70   Temp 98.3 °F (36.8 °C)   Resp 16   SpO2 100%          GEN: Obese middle aged male  Neuro: A+OX4, Gross motor intact.      Wound Description:  09/12/19 1154    [REMOVED] Wound Hip Left 05/30/19   Final Assessment Date/Final Assessment Time: 09/12/19 1120  Date First Assessed/Time First Assessed: 05/30/19 1623   Present on Hospital Admission: Yes  Wound Approximate Age at First Assessment (Weeks): 14 weeks  Primary Wound Type: Open incision/usman. .. Dressing Status Clean, dry, and intact   Dressing Type Adhesive wound dressing (Band-Aid)   Incision Site Well Approximated Yes   Non-staged Wound Description Full thickness   Drainage Amount None   Wound Odor None   Aurelia-wound Assessment Intact   Procedure Tolerated Well         Debridement: NOT Necessary to promote wound healing. Infection: No     Assessment:          Patient Active Problem List   Diagnosis Code    High cholesterol E78.00    HTN (hypertension) I10    Osteoarthritis of left hip M16.12    Postoperative wound infection of left hip T81.49XA    Open wound of left hip S71.002A    Prosthetic joint infection of left hip ([address] T84.52XA      The wound has completely healed.       Plan: 1.  DC From clinic.   Diane Seaman MD

## 2019-09-26 NOTE — DISCHARGE INSTRUCTIONS
Patient to return for wound care on: if needed    PLEASE CONTACT OFFICE AS SOON AS POSSIBLE IF UNABLE TO MAKE THIS APPOINTMENT. Inspect your wounds, looking for signs of infection which may include the following:  Increase in redness  Red \"streaks\" from wound  Increase in swelling  Fever  Unusual odor  Change in the amount of wound drainage. Should you experience any significant changes in your wound(s) or have any questions regarding your home care instructions please contact the wound center or your home health company. If after regular business hours, please call your family doctor or local emergency room.

## 2019-10-16 NOTE — OP NOTES
[facility] [place]  OPERATIVE REPORT    Name:  Mariam Srivastava  MR#:   [id number]  :  1960  ACCOUNT #:  [de-identified]  DATE OF SERVICE:  2019    PREOPERATIVE DIAGNOSES:  1.  Status post direct total hip arthroplasty with superficial wound infection. POSTOPERATIVE DIAGNOSES:  1.  Status post direct total hip arthroplasty with deep wound infection-refugio prosthetic infection  2. Left direct anterior total hip arthroplasty with postoperative deep hip infection. PROCEDURE PERFORMED:  1. Left hip incision and drainage of  hip replacement periprosthetic infection. 2.  Revision, left hip replacement (a Neutral 52 x 36 acetabular liner, a +1.5 x 36 titanium sleeve ceramic femoral head, removal of acetabular hole eliminator). SURGEON:  Albert Hubbard MD    ASSISTANT:  Mimi Santiago. NEHA Alvarado    ANESTHESIA:  General.    COMPLICATIONS:  None. SPECIMENS REMOVED:  Cup liner,ball,hole eliminator. IMPLANTS:  As above. ESTIMATED BLOOD LOSS:  150 mL. INDICATIONS:  This is a 63-year-old  male, approximately three and a half months status post a left direct anterior KATHERINE. The patient was doing well until approximately one month postop when he started getting some drainage from the superficial part of his hip. He was taken back to the OR where the fascia was determined to be intact, by myself, but seemed to be just superficial.  We did an I and D. He has been in [facility], but has gotten worse here in the last 10 to 14 days with increasing pain that is uncontrollable. He now presents for incision and drainage again and evaluation of his hip joint if necessary. PROCEDURE:  The patient was in the operating theater. After adequate anesthesia, the patient was put on the Godfrey table, and then the left hip was prepped and draped in the typical sterile fashion for a direct anterior hip surgery. Previous incision was present.   There was already a draining sinus below the previous incision that was not there in the office when I saw him yesterday. The incision was then opened from proximal all the way down to distal and extended another distal 1 inch. The purulence was then identified easily and aerobic and anaerobic cultures were obtained. I debrided the soft tissue in the superficial layer and then there was a track and my finger could follow that went all the way down to the anterior part of the hip joint. All end components were then opened to plan for a full debridement and hip revision. Using the pickups and the argon wand, dissection was carried out throughout the length of the wound all the way down to the hip joint, removing all scar tissue and any dead tissue or suspected tissue. When I got down to the hip joint, there certainly was fluid coming from this. I opened up as you would in a typical direct anterior hip from the acetabulum down to the lesser troch region. The capsule was excised so that I could visualize things well. The hip was then dislocated. The femoral head was disimpacted and removed and then a small pocket that was present allowed the femoral component to be placed up parallel to the acetabulum in shortened fashion. The cup was then debrided using pickups and the argon wand, and also a rongeur. The liner of the cup was then removed with a 0.25-inch osteotome and levered out of position. The whole limb area was removed as well. The femoral component and acetabulum were well fixed. I then used approximately 5000 L of solution that included a sterile Betadine mixture and then another 3-L bag that included polymyxin and bacitracin. This was Simpulse lavaged throughout. Once this fully lavaged, the new liner for the acetabulum was impacted in position. That was a 52 x 36 Neutral liner and then a new 1.5-mm x 36-mm titanium sleeve femoral head was impacted in position and reduced into the cup. Stability was still excellent.     The antibiotic beads by [employer] were already mixed on the back table and 1 g of vancomycin along with 600 mg of rifampin was placed in the beads so that they were ready to be placed at the end of the case. I dumped these into the hip joint and packed them around the neck of this, deep medially, laterally, and posteriorly. All the beads were placed deep around the joint itself. The fascia was then closed with interrupted figure-of-eight #1 Vicryl. The skin was closed in a loose-knit fashion with horizontal 3-0 nylons. Xeroform was then applied to the open part of the wound and this was dressed with 4 x 4s and tape. The patient was then awoke from anesthesia and returned to the recovery room awake and in stable condition. All instrument, sponge, and needle counts were correct.       Maria Del Carmen Chamorro MD      [id number]  D:  05/22/2019 17:01  T:  05/23/2019 2:49  JOB #:  [id number] O-L Flap Text: The defect edges were debeveled with a #15 scalpel blade.  Given the location of the defect, shape of the defect and the proximity to free margins an O-L flap was deemed most appropriate.  Using a sterile surgical marker, an appropriate advancement flap was drawn incorporating the defect and placing the expected incisions within the relaxed skin tension lines where possible.    The area thus outlined was incised deep to adipose tissue with a #15 scalpel blade.  The skin margins were undermined to an appropriate distance in all directions utilizing iris scissors.

## 2021-07-06 ENCOUNTER — TRANSCRIBE ORDER (OUTPATIENT)
Dept: SCHEDULING | Age: 61
End: 2021-07-06

## 2021-07-06 DIAGNOSIS — M25.552 LEFT HIP PAIN: Primary | ICD-10-CM

## 2022-03-18 PROBLEM — M16.12 OSTEOARTHRITIS OF LEFT HIP: Status: ACTIVE | Noted: 2019-02-08

## 2022-03-18 PROBLEM — T81.49XA POSTOPERATIVE WOUND INFECTION OF LEFT HIP: Status: ACTIVE | Noted: 2019-03-13

## 2022-03-19 PROBLEM — I10 HTN (HYPERTENSION): Status: ACTIVE | Noted: 2017-12-18

## 2022-03-19 PROBLEM — T84.52XA PROSTHETIC JOINT INFECTION OF LEFT HIP (HCC): Status: ACTIVE | Noted: 2019-05-22

## 2022-03-20 PROBLEM — E78.00 HIGH CHOLESTEROL: Status: ACTIVE | Noted: 2017-12-18

## 2022-03-20 PROBLEM — S71.002A OPEN WOUND OF LEFT HIP: Status: ACTIVE | Noted: 2019-05-01

## 2023-02-28 ENCOUNTER — HOSPITAL ENCOUNTER (OUTPATIENT)
Facility: HOSPITAL | Age: 63
Discharge: HOME | End: 2023-03-03
Payer: COMMERCIAL

## 2023-02-28 ENCOUNTER — ANESTHESIA EVENT (OUTPATIENT)
Facility: HOSPITAL | Age: 63
End: 2023-02-28
Payer: COMMERCIAL

## 2023-02-28 DIAGNOSIS — R31.0 GROSS HEMATURIA: ICD-10-CM

## 2023-02-28 LAB
ANION GAP SERPL CALC-SCNC: 4 MMOL/L (ref 3–18)
BUN SERPL-MCNC: 13 MG/DL (ref 7–18)
BUN/CREAT SERPL: 13 (ref 12–20)
CALCIUM SERPL-MCNC: 8.9 MG/DL (ref 8.5–10.1)
CHLORIDE SERPL-SCNC: 106 MMOL/L (ref 100–111)
CO2 SERPL-SCNC: 29 MMOL/L (ref 21–32)
CREAT SERPL-MCNC: 1.02 MG/DL (ref 0.6–1.3)
EKG ATRIAL RATE: 59 BPM
EKG DIAGNOSIS: NORMAL
EKG P AXIS: 42 DEGREES
EKG P-R INTERVAL: 160 MS
EKG Q-T INTERVAL: 424 MS
EKG QRS DURATION: 98 MS
EKG QTC CALCULATION (BAZETT): 419 MS
EKG R AXIS: 59 DEGREES
EKG T AXIS: 51 DEGREES
EKG VENTRICULAR RATE: 59 BPM
ERYTHROCYTE [DISTWIDTH] IN BLOOD BY AUTOMATED COUNT: 12.3 % (ref 11.6–14.5)
EST. AVERAGE GLUCOSE BLD GHB EST-MCNC: 229 MG/DL
GLUCOSE SERPL-MCNC: 190 MG/DL (ref 74–99)
HBA1C MFR BLD: 9.6 % (ref 4.2–5.6)
HCT VFR BLD AUTO: 40.4 % (ref 36–48)
HGB BLD-MCNC: 13.5 G/DL (ref 13–16)
MCH RBC QN AUTO: 31.5 PG (ref 24–34)
MCHC RBC AUTO-ENTMCNC: 33.4 G/DL (ref 31–37)
MCV RBC AUTO: 94.4 FL (ref 78–100)
NRBC # BLD: 0 K/UL (ref 0–0.01)
NRBC BLD-RTO: 0 PER 100 WBC
PLATELET # BLD AUTO: 157 K/UL (ref 135–420)
PMV BLD AUTO: 9.1 FL (ref 9.2–11.8)
POTASSIUM SERPL-SCNC: 4.5 MMOL/L (ref 3.5–5.5)
RBC # BLD AUTO: 4.28 M/UL (ref 4.35–5.65)
SODIUM SERPL-SCNC: 139 MMOL/L (ref 136–145)
WBC # BLD AUTO: 5.7 K/UL (ref 4.6–13.2)

## 2023-02-28 PROCEDURE — 36415 COLL VENOUS BLD VENIPUNCTURE: CPT

## 2023-02-28 PROCEDURE — 85027 COMPLETE CBC AUTOMATED: CPT

## 2023-02-28 PROCEDURE — 93005 ELECTROCARDIOGRAM TRACING: CPT

## 2023-02-28 PROCEDURE — 80048 BASIC METABOLIC PNL TOTAL CA: CPT

## 2023-02-28 PROCEDURE — 83036 HEMOGLOBIN GLYCOSYLATED A1C: CPT

## 2023-03-01 ENCOUNTER — ANESTHESIA (OUTPATIENT)
Facility: HOSPITAL | Age: 63
End: 2023-03-01
Payer: COMMERCIAL

## 2023-03-01 ENCOUNTER — HOSPITAL ENCOUNTER (OUTPATIENT)
Facility: HOSPITAL | Age: 63
Setting detail: OUTPATIENT SURGERY
Discharge: HOME | End: 2023-03-01
Attending: UROLOGY | Admitting: UROLOGY
Payer: COMMERCIAL

## 2023-03-01 VITALS
TEMPERATURE: 97.9 F | SYSTOLIC BLOOD PRESSURE: 126 MMHG | BODY MASS INDEX: 44.5 KG/M2 | HEIGHT: 70 IN | OXYGEN SATURATION: 95 % | WEIGHT: 310.8 LBS | DIASTOLIC BLOOD PRESSURE: 86 MMHG | HEART RATE: 69 BPM | RESPIRATION RATE: 18 BRPM

## 2023-03-01 DIAGNOSIS — D49.4 BLADDER TUMOR: Primary | ICD-10-CM

## 2023-03-01 LAB
GLUCOSE BLD STRIP.AUTO-MCNC: 153 MG/DL (ref 70–110)
GLUCOSE BLD STRIP.AUTO-MCNC: 181 MG/DL (ref 70–110)
GLUCOSE BLD STRIP.AUTO-MCNC: 202 MG/DL (ref 70–110)

## 2023-03-01 PROCEDURE — 3700000000 HC ANESTHESIA ATTENDED CARE: Performed by: UROLOGY

## 2023-03-01 PROCEDURE — 6360000002 HC RX W HCPCS: Performed by: NURSE ANESTHETIST, CERTIFIED REGISTERED

## 2023-03-01 PROCEDURE — 88307 TISSUE EXAM BY PATHOLOGIST: CPT

## 2023-03-01 PROCEDURE — 3600000012 HC SURGERY LEVEL 2 ADDTL 15MIN: Performed by: UROLOGY

## 2023-03-01 PROCEDURE — A4216 STERILE WATER/SALINE, 10 ML: HCPCS | Performed by: UROLOGY

## 2023-03-01 PROCEDURE — 2580000003 HC RX 258: Performed by: UROLOGY

## 2023-03-01 PROCEDURE — 88309 TISSUE EXAM BY PATHOLOGIST: CPT

## 2023-03-01 PROCEDURE — 6370000000 HC RX 637 (ALT 250 FOR IP): Performed by: ANESTHESIOLOGY

## 2023-03-01 PROCEDURE — 7100000001 HC PACU RECOVERY - ADDTL 15 MIN: Performed by: UROLOGY

## 2023-03-01 PROCEDURE — 2709999900 HC NON-CHARGEABLE SUPPLY: Performed by: UROLOGY

## 2023-03-01 PROCEDURE — 82962 GLUCOSE BLOOD TEST: CPT

## 2023-03-01 PROCEDURE — 2720000010 HC SURG SUPPLY STERILE: Performed by: UROLOGY

## 2023-03-01 PROCEDURE — 3700000001 HC ADD 15 MINUTES (ANESTHESIA): Performed by: UROLOGY

## 2023-03-01 PROCEDURE — 2500000003 HC RX 250 WO HCPCS: Performed by: NURSE ANESTHETIST, CERTIFIED REGISTERED

## 2023-03-01 PROCEDURE — 6360000002 HC RX W HCPCS: Performed by: UROLOGY

## 2023-03-01 PROCEDURE — 7100000010 HC PHASE II RECOVERY - FIRST 15 MIN: Performed by: UROLOGY

## 2023-03-01 PROCEDURE — 6360000002 HC RX W HCPCS: Performed by: ANESTHESIOLOGY

## 2023-03-01 PROCEDURE — 7100000011 HC PHASE II RECOVERY - ADDTL 15 MIN: Performed by: UROLOGY

## 2023-03-01 PROCEDURE — 3600000002 HC SURGERY LEVEL 2 BASE: Performed by: UROLOGY

## 2023-03-01 PROCEDURE — 7100000000 HC PACU RECOVERY - FIRST 15 MIN: Performed by: UROLOGY

## 2023-03-01 RX ORDER — OXYCODONE HYDROCHLORIDE AND ACETAMINOPHEN 5; 325 MG/1; MG/1
1 TABLET ORAL EVERY 4 HOURS PRN
Status: DISCONTINUED | OUTPATIENT
Start: 2023-03-01 | End: 2023-03-01 | Stop reason: HOSPADM

## 2023-03-01 RX ORDER — LIDOCAINE HYDROCHLORIDE 20 MG/ML
INJECTION, SOLUTION EPIDURAL; INFILTRATION; INTRACAUDAL; PERINEURAL PRN
Status: DISCONTINUED | OUTPATIENT
Start: 2023-03-01 | End: 2023-03-01 | Stop reason: SDUPTHER

## 2023-03-01 RX ORDER — PROPOFOL 10 MG/ML
INJECTION, EMULSION INTRAVENOUS PRN
Status: DISCONTINUED | OUTPATIENT
Start: 2023-03-01 | End: 2023-03-01 | Stop reason: SDUPTHER

## 2023-03-01 RX ORDER — DEXAMETHASONE SODIUM PHOSPHATE 4 MG/ML
INJECTION, SOLUTION INTRA-ARTICULAR; INTRALESIONAL; INTRAMUSCULAR; INTRAVENOUS; SOFT TISSUE PRN
Status: DISCONTINUED | OUTPATIENT
Start: 2023-03-01 | End: 2023-03-01 | Stop reason: SDUPTHER

## 2023-03-01 RX ORDER — OXYCODONE HYDROCHLORIDE AND ACETAMINOPHEN 5; 325 MG/1; MG/1
1 TABLET ORAL EVERY 4 HOURS PRN
Qty: 15 TABLET | Refills: 0 | Status: SHIPPED | OUTPATIENT
Start: 2023-03-01 | End: 2023-03-08

## 2023-03-01 RX ORDER — SODIUM CHLORIDE 0.9 % (FLUSH) 0.9 %
5-40 SYRINGE (ML) INJECTION PRN
Status: DISCONTINUED | OUTPATIENT
Start: 2023-03-01 | End: 2023-03-01 | Stop reason: HOSPADM

## 2023-03-01 RX ORDER — SODIUM CHLORIDE 9 MG/ML
INJECTION, SOLUTION INTRAVENOUS PRN
Status: DISCONTINUED | OUTPATIENT
Start: 2023-03-01 | End: 2023-03-01 | Stop reason: HOSPADM

## 2023-03-01 RX ORDER — LEVOFLOXACIN 500 MG/1
500 TABLET, FILM COATED ORAL DAILY
Qty: 3 TABLET | Refills: 0 | Status: SHIPPED | OUTPATIENT
Start: 2023-03-01 | End: 2023-03-04

## 2023-03-01 RX ORDER — DOCUSATE SODIUM 100 MG/1
100 CAPSULE, LIQUID FILLED ORAL 2 TIMES DAILY
Qty: 20 CAPSULE | Refills: 0 | Status: SHIPPED | OUTPATIENT
Start: 2023-03-01 | End: 2023-03-11

## 2023-03-01 RX ORDER — SODIUM CHLORIDE 0.9 % (FLUSH) 0.9 %
5-40 SYRINGE (ML) INJECTION EVERY 12 HOURS SCHEDULED
Status: DISCONTINUED | OUTPATIENT
Start: 2023-03-01 | End: 2023-03-01 | Stop reason: HOSPADM

## 2023-03-01 RX ORDER — SODIUM CHLORIDE, SODIUM LACTATE, POTASSIUM CHLORIDE, CALCIUM CHLORIDE 600; 310; 30; 20 MG/100ML; MG/100ML; MG/100ML; MG/100ML
INJECTION, SOLUTION INTRAVENOUS CONTINUOUS
Status: DISCONTINUED | OUTPATIENT
Start: 2023-03-01 | End: 2023-03-01 | Stop reason: HOSPADM

## 2023-03-01 RX ORDER — INSULIN LISPRO 100 [IU]/ML
0-4 INJECTION, SOLUTION INTRAVENOUS; SUBCUTANEOUS NIGHTLY
Status: DISCONTINUED | OUTPATIENT
Start: 2023-03-01 | End: 2023-03-01 | Stop reason: SDUPTHER

## 2023-03-01 RX ORDER — FENTANYL CITRATE 50 UG/ML
25 INJECTION, SOLUTION INTRAMUSCULAR; INTRAVENOUS EVERY 5 MIN PRN
Status: COMPLETED | OUTPATIENT
Start: 2023-03-01 | End: 2023-03-01

## 2023-03-01 RX ORDER — ONDANSETRON 2 MG/ML
INJECTION INTRAMUSCULAR; INTRAVENOUS PRN
Status: DISCONTINUED | OUTPATIENT
Start: 2023-03-01 | End: 2023-03-01 | Stop reason: SDUPTHER

## 2023-03-01 RX ORDER — MIDAZOLAM HYDROCHLORIDE 1 MG/ML
INJECTION INTRAMUSCULAR; INTRAVENOUS PRN
Status: DISCONTINUED | OUTPATIENT
Start: 2023-03-01 | End: 2023-03-01 | Stop reason: SDUPTHER

## 2023-03-01 RX ORDER — INSULIN LISPRO 100 [IU]/ML
4 INJECTION, SOLUTION INTRAVENOUS; SUBCUTANEOUS ONCE
Status: COMPLETED | OUTPATIENT
Start: 2023-03-01 | End: 2023-03-01

## 2023-03-01 RX ORDER — FENTANYL CITRATE 50 UG/ML
INJECTION, SOLUTION INTRAMUSCULAR; INTRAVENOUS PRN
Status: DISCONTINUED | OUTPATIENT
Start: 2023-03-01 | End: 2023-03-01 | Stop reason: SDUPTHER

## 2023-03-01 RX ORDER — HYDROMORPHONE HYDROCHLORIDE 1 MG/ML
0.5 INJECTION, SOLUTION INTRAMUSCULAR; INTRAVENOUS; SUBCUTANEOUS EVERY 5 MIN PRN
Status: DISCONTINUED | OUTPATIENT
Start: 2023-03-01 | End: 2023-03-01 | Stop reason: HOSPADM

## 2023-03-01 RX ORDER — EPHEDRINE SULFATE/0.9% NACL/PF 50 MG/5 ML
SYRINGE (ML) INTRAVENOUS PRN
Status: DISCONTINUED | OUTPATIENT
Start: 2023-03-01 | End: 2023-03-01 | Stop reason: SDUPTHER

## 2023-03-01 RX ORDER — PHENAZOPYRIDINE HYDROCHLORIDE 200 MG/1
200 TABLET, FILM COATED ORAL
Qty: 15 TABLET | Refills: 0 | Status: SHIPPED | OUTPATIENT
Start: 2023-03-01 | End: 2023-03-06

## 2023-03-01 RX ADMIN — FENTANYL CITRATE 25 MCG: 50 INJECTION, SOLUTION INTRAMUSCULAR; INTRAVENOUS at 12:55

## 2023-03-01 RX ADMIN — HYDROMORPHONE HYDROCHLORIDE 0.5 MG: 1 INJECTION, SOLUTION INTRAMUSCULAR; INTRAVENOUS; SUBCUTANEOUS at 14:28

## 2023-03-01 RX ADMIN — PROPOFOL 200 MG: 10 INJECTION, EMULSION INTRAVENOUS at 11:53

## 2023-03-01 RX ADMIN — GEMCITABINE HYDROCHLORIDE 1000 MG: 1 INJECTION, SOLUTION INTRAVENOUS at 12:30

## 2023-03-01 RX ADMIN — HYDROMORPHONE HYDROCHLORIDE 0.5 MG: 1 INJECTION, SOLUTION INTRAMUSCULAR; INTRAVENOUS; SUBCUTANEOUS at 12:01

## 2023-03-01 RX ADMIN — FENTANYL CITRATE 50 MCG: 50 INJECTION, SOLUTION INTRAMUSCULAR; INTRAVENOUS at 12:04

## 2023-03-01 RX ADMIN — FENTANYL CITRATE 25 MCG: 50 INJECTION, SOLUTION INTRAMUSCULAR; INTRAVENOUS at 13:00

## 2023-03-01 RX ADMIN — SODIUM CHLORIDE, SODIUM LACTATE, POTASSIUM CHLORIDE, AND CALCIUM CHLORIDE: 600; 310; 30; 20 INJECTION, SOLUTION INTRAVENOUS at 14:00

## 2023-03-01 RX ADMIN — Medication 10 MG: at 12:17

## 2023-03-01 RX ADMIN — ONDANSETRON HYDROCHLORIDE 4 MG: 2 INJECTION INTRAMUSCULAR; INTRAVENOUS at 12:01

## 2023-03-01 RX ADMIN — SODIUM CHLORIDE, SODIUM LACTATE, POTASSIUM CHLORIDE, AND CALCIUM CHLORIDE: 600; 310; 30; 20 INJECTION, SOLUTION INTRAVENOUS at 12:17

## 2023-03-01 RX ADMIN — Medication 3000 MG: at 11:57

## 2023-03-01 RX ADMIN — SODIUM CHLORIDE, SODIUM LACTATE, POTASSIUM CHLORIDE, AND CALCIUM CHLORIDE: 600; 310; 30; 20 INJECTION, SOLUTION INTRAVENOUS at 10:59

## 2023-03-01 RX ADMIN — OXYCODONE AND ACETAMINOPHEN 1 TABLET: 5; 325 TABLET ORAL at 16:29

## 2023-03-01 RX ADMIN — FENTANYL CITRATE 25 MCG: 50 INJECTION, SOLUTION INTRAMUSCULAR; INTRAVENOUS at 13:05

## 2023-03-01 RX ADMIN — FENTANYL CITRATE 25 MCG: 50 INJECTION, SOLUTION INTRAMUSCULAR; INTRAVENOUS at 12:50

## 2023-03-01 RX ADMIN — LIDOCAINE HYDROCHLORIDE 100 MG: 20 INJECTION, SOLUTION EPIDURAL; INFILTRATION; INTRACAUDAL; PERINEURAL at 11:53

## 2023-03-01 RX ADMIN — HYDROMORPHONE HYDROCHLORIDE 0.5 MG: 1 INJECTION, SOLUTION INTRAMUSCULAR; INTRAVENOUS; SUBCUTANEOUS at 11:50

## 2023-03-01 RX ADMIN — INSULIN LISPRO 4 UNITS: 100 INJECTION, SOLUTION INTRAVENOUS; SUBCUTANEOUS at 11:20

## 2023-03-01 RX ADMIN — HYDROMORPHONE HYDROCHLORIDE 0.5 MG: 1 INJECTION, SOLUTION INTRAMUSCULAR; INTRAVENOUS; SUBCUTANEOUS at 13:47

## 2023-03-01 RX ADMIN — DEXAMETHASONE SODIUM PHOSPHATE 4 MG: 4 INJECTION, SOLUTION INTRAMUSCULAR; INTRAVENOUS at 12:01

## 2023-03-01 RX ADMIN — MIDAZOLAM 2 MG: 1 INJECTION INTRAMUSCULAR; INTRAVENOUS at 11:50

## 2023-03-01 RX ADMIN — FENTANYL CITRATE 50 MCG: 50 INJECTION, SOLUTION INTRAMUSCULAR; INTRAVENOUS at 12:27

## 2023-03-01 ASSESSMENT — PAIN DESCRIPTION - DESCRIPTORS
DESCRIPTORS: SHARP;DISCOMFORT;PRESSURE
DESCRIPTORS: DISCOMFORT
DESCRIPTORS: SHARP
DESCRIPTORS: DISCOMFORT;PRESSURE
DESCRIPTORS: DISCOMFORT

## 2023-03-01 ASSESSMENT — PAIN SCALES - GENERAL
PAINLEVEL_OUTOF10: 4
PAINLEVEL_OUTOF10: 3
PAINLEVEL_OUTOF10: 7
PAINLEVEL_OUTOF10: 6
PAINLEVEL_OUTOF10: 5
PAINLEVEL_OUTOF10: 6
PAINLEVEL_OUTOF10: 7
PAINLEVEL_OUTOF10: 4
PAINLEVEL_OUTOF10: 8
PAINLEVEL_OUTOF10: 0
PAINLEVEL_OUTOF10: 3

## 2023-03-01 ASSESSMENT — PAIN - FUNCTIONAL ASSESSMENT: PAIN_FUNCTIONAL_ASSESSMENT: 0-10

## 2023-03-01 ASSESSMENT — PAIN DESCRIPTION - ORIENTATION
ORIENTATION: ANTERIOR
ORIENTATION: ANTERIOR

## 2023-03-01 ASSESSMENT — PAIN DESCRIPTION - LOCATION
LOCATION: PELVIS
LOCATION: PELVIS
LOCATION: PENIS

## 2023-03-01 NOTE — ANESTHESIA PRE PROCEDURE
Department of Anesthesiology  Preprocedure Note       Name:  Medina Otero   Age:  66 y.o.  :  1960                                          MRN:  [id number]         Date:  3/1/2023      Surgeon: Taylor Ariza):  Rose Chavez MD    Procedure: Procedure(s):  CYSTOSCOPY TRANSURETHRAL RESECTION BLADDER WITH GEMCITABINE    Medications prior to admission:   Prior to Admission medications    Medication Sig Start Date End Date Taking?  Authorizing Provider   fexofenadine (ALLEGRA) 180 MG tablet Take 180 mg by mouth daily Indications: allergies    Historical Provider, MD   Esomeprazole Magnesium (NEXIUM 24HR PO) Take by mouth daily Indications: GERD    Historical Provider, MD   Multiple Vitamins-Minerals (THERAPEUTIC MULTIVITAMIN-MINERALS) tablet Take 1 tablet by mouth daily    Historical Provider, MD   valsartan (DIOVAN) 320 MG tablet Take 320 mg by mouth daily Indications: HTN    Historical Provider, MD   amLODIPine (NORVASC) 5 MG tablet Take 5 mg by mouth daily Indications: HTN    Historical Provider, MD   amLODIPine (NORVASC) 2.5 MG tablet Take 2.5 mg by mouth at bedtime Indications: HTN    Historical Provider, MD   carvedilol (COREG) 12.5 MG tablet Take by mouth 2 times daily (with meals) Indications: HTN Takes 1.5 tabs    Historical Provider, MD   metFORMIN (GLUCOPHAGE) 1000 MG tablet Take 1,000 mg by mouth 2 times daily (with meals) Indications: DM- type 2    Historical Provider, MD   Dulaglutide (TRULICITY) 3 GV/4.5KA SOPN Inject 3 mg into the skin once a week Indications: DM Wednesday    Historical Provider, MD   atorvastatin (LIPITOR) 20 MG tablet Take 20 mg by mouth nightly Indications: high chol    Historical Provider, MD       Current medications:    Current Facility-Administered Medications   Medication Dose Route Frequency Provider Last Rate Last Admin    lactated ringers IV soln infusion   IntraVENous Continuous Rose Chavez  mL/hr at 23 1059 New Bag at 23 1051 Facility-Administered Medications Ordered in Other Encounters   Medication Dose Route Frequency Provider Last Rate Last Admin    midazolam (VERSED) injection   IntraVENous PRN Nonnie Bears, APRN - CRNA   2 mg at 03/01/23 1150       Allergies:  No Known Allergies    Problem List:    Patient Active Problem List   Diagnosis Code    Osteoarthritis of left hip M16.12    Postoperative wound infection of left hip T81.49XA    HTN (hypertension) I10    Prosthetic joint infection of left hip ([facility] T84.52XA    High cholesterol E78.00    Open wound of left hip S71.002A       Past Medical History:        Diagnosis Date    Bladder cancer ([facility] 2022    Bursitis of right shoulder     Chronic pain     right shoulder and lower back.  Diabetes ([facility] [address] [facility] 2015    GERD (gastroesophageal reflux disease)     Hypercholesterolemia     Hypertension 2015    Morbid obesity ([facility] [address] [facility]     1- BMI 46.1    OA (osteoarthritis)     Sleep apnea     has CPAP- OOT alot so only uses when he is home       Past Surgical History:        Procedure Laterality Date    GI      EGD, Colonoscopy    HEENT Bilateral     lasik eye surgery    KNEE ARTHROSCOPY Left 12/2017    ORTHOPEDIC SURGERY Left     CTR    ORTHOPEDIC SURGERY Left 03/13/2019    hip superficial I&D    OTHER SURGICAL HISTORY      OTHER SURGICAL HISTORY      wisdom teeth extraction ,bone graft    TOTAL HIP ARTHROPLASTY Left 02/2019    TOTAL HIP ARTHROPLASTY  2019    revision       Social History:    Social History     Tobacco Use    Smoking status: Former     Types: Cigars     Quit date: 2013     Years since quitting: 10.1    Smokeless tobacco: Never    Tobacco comments:     Quit smoking: one cigar 3 x year   Substance Use Topics    Alcohol use:  Yes     Alcohol/week: 6.0 standard drinks     Comment: 2 times per month                                Counseling given: Not Answered  Tobacco comments: Quit smoking: one cigar 3 x year      Vital Signs (Current): Vitals:    03/01/23 1016   BP: (!) 148/88   Pulse: 71   Resp: 16   Temp: 97.5 °F (36.4 °C)   TempSrc: Temporal   SpO2: 98%   Weight: (!) 310 lb 12.8 oz (141 kg)   Height: 5' 10\" (1.778 m)                                              BP Readings from Last 3 Encounters:   03/01/23 (!) 148/88       NPO Status:                                                   Date of last liquid consumption: 02/28/23                        Date of last solid food consumption: 02/28/23    BMI:   Wt Readings from Last 3 Encounters:   03/01/23 (!) 310 lb 12.8 oz (141 kg)   02/27/23 (!) 305 lb (138.3 kg)     Body mass index is 44.6 kg/m².     CBC:   Lab Results   Component Value Date/Time    WBC 5.7 02/28/2023 10:00 AM    RBC 4.28 02/28/2023 10:00 AM    HGB 13.5 02/28/2023 10:00 AM    HCT 40.4 02/28/2023 10:00 AM    MCV 94.4 02/28/2023 10:00 AM    RDW 12.3 02/28/2023 10:00 AM     02/28/2023 10:00 AM       CMP:   Lab Results   Component Value Date/Time     02/28/2023 10:00 AM    K 4.5 02/28/2023 10:00 AM     02/28/2023 10:00 AM    CO2 29 02/28/2023 10:00 AM    BUN 13 02/28/2023 10:00 AM    CREATININE 1.02 02/28/2023 10:00 AM    GFRAA >60 05/24/2019 03:00 AM    LABGLOM >60 02/28/2023 10:00 AM    GLUCOSE 190 02/28/2023 10:00 AM    CALCIUM 8.9 02/28/2023 10:00 AM       POC Tests:   Recent Labs     03/01/23  1147   POCGLU 181*       Coags: No results found for: PROTIME, INR, APTT    HCG (If Applicable): No results found for: PREGTESTUR, PREGSERUM, HCG, HCGQUANT     ABGs: No results found for: PHART, PO2ART, MJW9LHW, UOG7CHR, BEART, P9JTXIAD     Type & Screen (If Applicable):  No results found for: LABABO, LABRH    Drug/Infectious Status (If Applicable):  No results found for: HIV, HEPCAB    COVID-19 Screening (If Applicable): No results found for: COVID19        Anesthesia Evaluation  Patient summary reviewed and Nursing notes reviewed no history of anesthetic complications:   Airway: Mallampati: IV  TM distance: >3 FB Neck ROM: full  Mouth opening: > = 3 FB   Dental: normal exam         Pulmonary: breath sounds clear to auscultation  (+) sleep apnea: on CPAP,                             Cardiovascular:  Exercise tolerance: good (>4 METS),   (+) hypertension:,         Rhythm: regular  Rate: normal           Beta Blocker:  Dose within 24 Hrs         Neuro/Psych:   Negative Neuro/Psych ROS              GI/Hepatic/Renal:   (+) GERD:, morbid obesity          Endo/Other:    (+) Diabetes, . Abdominal:             Vascular: negative vascular ROS. Other Findings:           Anesthesia Plan      general     ASA 3       Induction: intravenous. Anesthetic plan and risks discussed with patient. Plan discussed with CRNA and attending.     Attending anesthesiologist reviewed and agrees with Rhett Gomez MD   3/1/2023

## 2023-03-01 NOTE — PERIOP NOTE
[place] Gravity bag changed to leg bag without problems. 200 mls of clear, pink urine drained. Patient tolerated well.

## 2023-03-01 NOTE — PERIOP NOTE
Discharge instructions were reviewed with patient and his wife. All questions were answered. [place]os cath care was taught and they both stated that they understood.

## 2023-03-01 NOTE — PERIOP NOTE
TRANSFER - IN REPORT:    Verbal report received from Faraz Rainey  on Paola Munguia  being received from PACU for routine post-op      Report consisted of patient's Situation, Background, Assessment and   Recommendations(SBAR). Information from the following report(s) Nurse Handoff Report, Surgery Report, and MAR was reviewed with the receiving nurse. Opportunity for questions and clarification was provided. Assessment completed upon patient's arrival to unit and care assumed.

## 2023-03-01 NOTE — H&P
Update History & Physical    The patient's History and Physical of February 28, 2023 was reviewed with the patient and I examined the patient. There was no change. The surgical site was confirmed by the patient and me. Plan: The risks, benefits, expected outcome, and alternative to the recommended procedure have been discussed with the patient. Patient understands and wants to proceed with the procedure. Electronically signed by Sivan Chawla MD on 3/1/2023 at 11:33 AM         [facility] [place] [address] [address] [facility] [address]  Tel: [phone]  Fax: [phone]    Patient :  Medina Otero  YOB: 1960  Birth Sex:  Male  Current Gender:  Male  Date:   02/28/2023 8:00 AM   Visit Type:    Office Visit  Assessment/Plan  #  Detail Type  Description   1. Assessment  Gross hematuria (R31.0). Patient Plan  today I reviewed patient CT scan shows a filling defect left lateral wall of the bladder persistent gross hematuria. He is scheduled for TURBT medium/large 03/01/2023 [facility].  Patient continues to have gross hematuria. I showed him the CT scans a reviewed. His ready to proceed. We discussed the risks and benefits in detail for the procedure. Plan for TURBT medium/large with gemcitabine. 2.  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 Patient Plan  today I reviewed patient CT scan shows a filling defect left lateral wall of the bladder persistent gross hematuria. He is scheduled for TURBT medium/large 03/01/2023 [facility].  Patient continues to have gross hematuria. I showed him the CT scans a reviewed. His ready to proceed. We discussed the risks and benefits in detail for the procedure. TURBT medium/large with gemcitabine 03/01/2023. Additional Visit Information      This 66year old male presents for Hematuria. History of Present Illness  1.   Hematuria   The patient presents with total hematuria (gross with clots). The problem began suddenly and occurs every 1-3 months. The problem has improved. The patient presents with pain. Pertinent history includes being at least 38years of age and last 6 weeks  lifting furnitur but not daily aspirin use, history of bladder cancer, history of irritative voiding symptoms, prior history of stones, history of prostate cancer, history of renal cancer, history of UTIs, family history of bladder cancer, family history of renal cancer, family history of stones, prior prostate surgeries, exposure to radiation, self-catheterization, smoking and trauma or urolithiasis. Denies aggravating factors. Denies relieving factors. The patient also complains of nausea and nocturia (2 times a night). The patient denies chills, dysuria, fever, pressure, secondhand smoke, being sexually active, slow stream, splitting stream, urinary dribbling, urinary frequency, urinary hesitancy, straining to urinate, urgency and vomiting. Additional information: New pt to Urology today with gross hematuria and clots. Thinks that a kidney cyst ruptured that caused the gross hematuria. Has had bilat upper back soreness. He is a retired  and wife is a nurse. He had been lifting, pushing and pulling furniture in the last 6 weeks. Comments: 02/28/2023-today I reviewed patient CT scan shows a filling defect left lateral wall of the bladder persistent gross hematuria. He is scheduled for TURBT medium/large 03/01/2023 \[facility]\"".  Patient continues to have gross hematuria. I showed him the CT scans a reviewed. His ready to proceed. We discussed the risks and benefits in detail for the procedure. Medical/Surgical/Interim History  Reviewed, no change. Problem List  Problem List reviewed.      Medications (active prior to today)  Medication  Instructions  Start Date  Stop Date  Refilled  Elsewhere  gemcitabine 2 gram intravenous solution  instill 2 grams by intravesical route into the bladder, dilute with 50ml sodium chloride  02/27/2023      N  metformin  take 1 tablet by oral route 2 times every day with morning and evening meals  //      Y      Medication Reconciliation  Medications reconciled today. Medication Reviewed  Adherence  Medication Name  Sig Desc  Elsewhere  Status  taking as directed  metformin  take 1 tablet by oral route 2 times every day with morning and evening meals  Y  Verified  taking as directed  gemcitabine 2 gram intravenous solution  instill 2 grams by intravesical route into the bladder, dilute with 50ml sodium chloride  N  Verified    Allergies  Ingredient  Reaction (Severity)  Medication Name  Comment  NO KNOWN ALLERGIES          Reviewed, no changes. Family History  Reviewed, no changes. Social History  Reviewed, no changes. Review of Systems  System  Neg/Pos  Details  Constitutional  Positive  Pain. Constitutional  Negative  Chills and Fever. ENMT  Negative  Ear infections and Sore throat. Eyes  Negative  Blurred vision, Double vision and Eye pain. Respiratory  Negative  Asthma, Chronic cough, Dyspnea and Wheezing. Cardio  Negative  Chest pain. GI  Negative  Constipation, Decreased appetite, Diarrhea, Nausea and Vomiting.   Positive  Hematuria, Nocturia.   Negative  Dysuria, Pressure, Slow stream, Splitting stream, Urgency, Urinary dribbling, Urinary frequency, Urinary hesitancy and Urinary straining. Endocrine  Negative  Cold intolerance, Heat intolerance, Increased thirst and Weight loss. Neuro  Negative  Headache and Tremors. Psych  Negative  Anxiety and Depression. Integumentary  Negative  Itching skin and Rash. MS  Negative  Back pain and Joint pain. Hema/Lymph  Negative  Easy bleeding. Reproductive  Negative  Sexual dysfunction.       Vital Signs  Height  Time  ft  in  cm  Last Measured  Height Position  11:08 AM  5.0  10.00  177.80  02/28/2023      Weight/BSA/BMI  Time  lb  oz  kg  Context  BMI kg/m2  BSA m2  11:08 AM  314.70    142.746  dressed with shoes  45.15      Blood Pressure  Time  BP mm/Hg  Position  Side  Site  Method  Cuff Size  11:08 AM  138/84  sitting  left  brachial  manual  adult    Temperature/Pulse/Respiration  Time  Temp F  Temp C  Temp Site  Pulse/min  Pattern  Resp/ min  11:08 AM        62  regular      Measured by  Time  Measured by  11:08 AM  Harrison Villagran    Physical Exam  Exam  Findings  Details  Constitutional  Normal  Well developed. Respiratory  Normal  Inspection - Normal.  Musculoskeletal  Normal  Gait - Normal.  Neurological  Normal  Level of consciousness - Normal. Orientation - Normal. Memory - Normal.  Psychiatric  Normal  Oriented to time, place, person and situation. Appropriate mood and affect.     Medications (added, continued, or stopped today)  Start Date  Medication  Directions  PRN Status  PRN Reason  Instruction  Stop Date  02/27/2023  gemcitabine 2 gram intravenous solution  instill 2 grams by intravesical route into the bladder, dilute with 50ml sodium chloride  N          metformin  take 1 tablet by oral route 2 times every day with morning and evening meals  N            Active Patient Care Team Members  Name  Contact  Agency Type  Support Role  Relationship  Active Date  Inactive Date  [address] [facility]      Patient provider  PCP        Hollis Avalos

## 2023-03-01 NOTE — OP NOTE
Operative Note      Patient: Nkechi Polanco  YOB: 1960  MRN: [id number]    Date of Procedure: 3/1/2023    Pre-Op Diagnosis: GROSS HEMATURIA, NEOPLASM OF UNSPECIFIED BEHAVIOR BLADDER LEFT LATERAL WALL    Post-Op Diagnosis: Same       Procedure(s):  CYSTOSCOPY TRANSURETHRAL RESECTION BLADDER TUMOR LARGE WITH GEMCITABINE    Surgeon(s):  Rose Chavez MD    Assistant:   * No surgical staff found *    Anesthesia: General    Estimated Blood Loss (mL): Minimal    Complications: None    Specimens:   ID Type Source Tests Collected by Time Destination   A : LEFT LATERAL BLADDER WALL RESECTION Tissue Bladder SURGICAL PATHOLOGY Rose Chavez MD 3/1/2023 1219        Implants:  * No implants in log *      Drains:   Urinary Catheter 03/01/23 [place] (Active)   Catheter Indications Perioperative use for selected surgical procedures 03/01/23 1236   Site Assessment Urethral drainage 03/01/23 1236   Status Clamped 03/01/23 1236       Findings: Left lateral wall bladder tumor calcified, completely removed and resected down to the bladder muscle. Left UO is not involved. Detailed Description of Procedure: On the date of operation, the patient signed consent after we explained the risks, benefits, and alternatives of surgical intervention. The procedure was explained to him and all questions were answered in the preoperative area. He was then brought to the operative theater and placed on the table in the supine position with subsequent administration of a general anesthesia. A timeout was performed and administration of preoperative antibiotics. When the patient was sufficiently anesthetized, he was moved into dorsal lithotomy position, prepped and draped in the normal sterile fashion. We then inserted a resectoscopic sheath with a visual obturator and 30 degree cystoscopic lens and surveyed the bladder, which revealed tumors as stated above.    The bipolar resectoscopic working element with loop was used to resect the bladder lesion. Bilateral ureteral orifices were identified and uninvolved in tumor resection. Bleeding was well controlled at this point. The tissue fragments were then evacuated and sent off the field. We then returned to the bladder to assess hemostasis once again and verified that bleeding was well-controlled. 16fr gaytan catheter was placed and gemcitabine instilled. Patient was then extubated after returning to the supine position and transferred to the PACU in stable condition.            Electronically signed by Evaristo Maya MD on 3/1/2023 at 12:55 PM

## 2023-03-01 NOTE — PERIOP NOTE
Dr. Kofi Ladd notified of bleeding from gaytan site. Suggested we reinforce a guaze pressure dressing.

## 2023-03-01 NOTE — DISCHARGE INSTRUCTIONS
2 [facility], Urology     [facility], [address], [address] [place] [address],  Miguel Bowser  Office: [phone]  Fax:    [phone]   TURBT   __  [facility] IMMEDIATELY if any of the following occur: You are unable to urinate. Urgency to urinate is not uncommon. You find yourself urinating small frequent amounts associated with severe lower abdominal discomfort. Bright red blood with clots in the urine. Some reddish urine is not uncommon and should be treated with increasing the amount of fluids you drink. Temperature above 101.5° and / or chills. You are nauseous and / or vomiting and you cannot hold down any fluids. Your pain is not controlled with the pain medication prescribed. Special Considerations:     Do not drive for at least 95-95 hours after the procedure and until you are no longer taking narcotic pain medication and you are able to move and react without hesitation. _x_  No heavy lifting over 10 pounds & no strenuous exercise for 1  week   __x  Other instructions:  Drink about 2 liters of water a day. _x_  Our office will call you to make an appointment. Please contact [place] [address] [facility] at [phone] or go to the nearest Emergency Department / Urgent Care facility for any other medical questions or concerns. DISCHARGE SUMMARY from Nurse    PATIENT INSTRUCTIONS:    After general anesthesia or intravenous sedation, for 24 hours or while taking prescription Narcotics:  Limit your activities  Do not drive and operate hazardous machinery  Do not make important personal or business decisions  Do  not drink alcoholic beverages  If you have not urinated within 8 hours after discharge, please contact your surgeon on call.     Report the following to your surgeon:  Excessive pain, swelling, redness or odor of or around the surgical area  Temperature over 100.5  Nausea and vomiting lasting longer than 4 hours or if unable to take medications  Any signs of decreased circulation or nerve impairment to extremity: change in color, persistent  numbness, tingling, coldness or increase pain  Any questions    What to do at Home:  Recommended activity: activity as tolerated and no driving for today        *  Please give a list of your current medications to your Primary Care Provider. *  Please update this list whenever your medications are discontinued, doses are      changed, or new medications (including over-the-counter products) are added. *  Please carry medication information at all times in case of emergency situations. These are general instructions for a healthy lifestyle:    No smoking/ No tobacco products/ Avoid exposure to second hand smoke  Surgeon General's Warning:  Quitting smoking now greatly reduces serious risk to your health. Obesity, smoking, and sedentary lifestyle greatly increases your risk for illness    A healthy diet, regular physical exercise & weight monitoring are important for maintaining a healthy lifestyle    You may be retaining fluid if you have a history of heart failure or if you experience any of the following symptoms:  Weight gain of 3 pounds or more overnight or 5 pounds in a week, increased swelling in our hands or feet or shortness of breath while lying flat in bed. Please call your doctor as soon as you notice any of these symptoms; do not wait until your next office visit. The discharge information has been reviewed with the patient and caregiver. The patient and caregiver verbalized understanding. Discharge medications reviewed with the patient and caregiver and appropriate educational materials and side effects teaching were provided.     Patient armband removed and shredded   ___________________________________________________________________________________________________________________________________

## 2023-03-01 NOTE — PERIOP NOTE
Reviewed PTA medication list with patient/caregiver and patient/caregiver denies any additional medications. Patient admits to having a responsible adult care for them at home for at least 24 hours after surgery. Patient encouraged to use gown warming system and informed that using said warming gown to regulate body temperature prior to a procedure has been shown to help reduce the risks of blood clots and infection. Patient's pharmacy of choice verified and documented in PTA medication section. Dual skin assessment & fall risk band verification completed with Luis Fernando Sánchez RN.

## 2023-03-01 NOTE — PERIOP NOTE
TRANSFER - OUT REPORT:    Verbal report given to JACQUELINE MANCINI on [employer]  being transferred to PHASE 2 for routine post-op       Report consisted of patient's Situation, Background, Assessment and   Recommendations(SBAR). Information from the following report(s) Nurse Handoff Report, Intake/Output, and MAR was reviewed with the receiving nurse. [place] Assessment: No data recorded  Lines:   Peripheral IV 03/01/23 Left;Posterior Hand (Active)   Site Assessment Clean, dry & intact 03/01/23 1236   Line Status Infusing 03/01/23 1236   Phlebitis Assessment No symptoms 03/01/23 1236   Infiltration Assessment 0 03/01/23 1236   Alcohol Cap Used No 03/01/23 1058   Dressing Status Clean, dry & intact 03/01/23 1236   Dressing Type Transparent 03/01/23 1236        Opportunity for questions and clarification was provided.       Patient transported with:  Yfn Pearce

## 2023-03-01 NOTE — PERIOP NOTE
TRANSFER - IN REPORT:    Verbal report received from CRNA AND RN on [employer]  being received from OR for routine post-op      Report consisted of patient's Situation, Background, Assessment and   Recommendations(SBAR). Information from the following report(s) Nurse Handoff Report, Intake/Output, and MAR was reviewed with the receiving nurse. Opportunity for questions and clarification was provided. Assessment completed upon patient's arrival to unit and care assumed.

## 2023-03-01 NOTE — ANESTHESIA POSTPROCEDURE EVALUATION
Department of Anesthesiology  Postprocedure Note    Patient: Juan Yousif  MRN: [id number]  YOB: 1960  Date of evaluation: 3/1/2023      Procedure Summary     Date: 03/01/23 Room / Location: THE [facility]STO / [facility] MAIN OR    Anesthesia Start: 1150 Anesthesia Stop: 4279    Procedure: CYSTOSCOPY TRANSURETHRAL RESECTION BLADDER TUMOR LARGE WITH GEMCITABINE ([place]) Diagnosis:       Gross hematuria      (GROSS HEMATURIA)    Surgeons: Garett Corrigan MD Responsible Provider: Steve Quiros MD    Anesthesia Type: General ASA Status: 3          Anesthesia Type: General    Patrick Phase I:      Patrick Phase II:        Anesthesia Post Evaluation    Patient location during evaluation: PACU  Patient participation: complete - patient participated  Level of consciousness: awake  Pain score: 0  Airway patency: patent  Nausea & Vomiting: no nausea and no vomiting  Complications: no  Cardiovascular status: blood pressure returned to baseline  Respiratory status: acceptable  Hydration status: stable  Multimodal analgesia pain management approach

## 2023-03-01 NOTE — PERIOP NOTE
Dr. Alfredo Mcgee irrigated the patient's gaytan d/t color of urine once the gaytan was unclamped for possible blood clots. Instructed to give patient a leg bag for home use.  Must come back to office in the morning from 9am-11am to have gaytan removed and drink plenty of water in the meantime per Dr. Alfredo Mcgee.

## 2023-04-17 ENCOUNTER — ANESTHESIA EVENT (OUTPATIENT)
Facility: HOSPITAL | Age: 63
End: 2023-04-17
Payer: COMMERCIAL

## 2023-04-19 ENCOUNTER — HOSPITAL ENCOUNTER (OUTPATIENT)
Facility: HOSPITAL | Age: 63
Setting detail: OUTPATIENT SURGERY
Discharge: HOME | End: 2023-04-19
Attending: UROLOGY | Admitting: UROLOGY
Payer: COMMERCIAL

## 2023-04-19 ENCOUNTER — ANESTHESIA (OUTPATIENT)
Facility: HOSPITAL | Age: 63
End: 2023-04-19
Payer: COMMERCIAL

## 2023-04-19 VITALS
RESPIRATION RATE: 14 BRPM | HEART RATE: 72 BPM | SYSTOLIC BLOOD PRESSURE: 136 MMHG | HEIGHT: 70 IN | DIASTOLIC BLOOD PRESSURE: 82 MMHG | OXYGEN SATURATION: 96 % | TEMPERATURE: 97.2 F | BODY MASS INDEX: 43.38 KG/M2 | WEIGHT: 303 LBS

## 2023-04-19 DIAGNOSIS — C67.2 MALIGNANT NEOPLASM OF LATERAL WALL OF URINARY BLADDER (HCC): Primary | ICD-10-CM

## 2023-04-19 LAB
GLUCOSE BLD STRIP.AUTO-MCNC: 120 MG/DL (ref 70–110)
GLUCOSE BLD STRIP.AUTO-MCNC: 138 MG/DL (ref 70–110)

## 2023-04-19 PROCEDURE — 3700000000 HC ANESTHESIA ATTENDED CARE: Performed by: UROLOGY

## 2023-04-19 PROCEDURE — 6360000002 HC RX W HCPCS: Performed by: UROLOGY

## 2023-04-19 PROCEDURE — 3600000002 HC SURGERY LEVEL 2 BASE: Performed by: UROLOGY

## 2023-04-19 PROCEDURE — 2500000003 HC RX 250 WO HCPCS: Performed by: NURSE ANESTHETIST, CERTIFIED REGISTERED

## 2023-04-19 PROCEDURE — 6360000002 HC RX W HCPCS: Performed by: NURSE ANESTHETIST, CERTIFIED REGISTERED

## 2023-04-19 PROCEDURE — 3700000001 HC ADD 15 MINUTES (ANESTHESIA): Performed by: UROLOGY

## 2023-04-19 PROCEDURE — 88309 TISSUE EXAM BY PATHOLOGIST: CPT

## 2023-04-19 PROCEDURE — 7100000011 HC PHASE II RECOVERY - ADDTL 15 MIN: Performed by: UROLOGY

## 2023-04-19 PROCEDURE — 82962 GLUCOSE BLOOD TEST: CPT

## 2023-04-19 PROCEDURE — 7100000000 HC PACU RECOVERY - FIRST 15 MIN: Performed by: UROLOGY

## 2023-04-19 PROCEDURE — 7100000001 HC PACU RECOVERY - ADDTL 15 MIN: Performed by: UROLOGY

## 2023-04-19 PROCEDURE — 88307 TISSUE EXAM BY PATHOLOGIST: CPT

## 2023-04-19 PROCEDURE — 2720000010 HC SURG SUPPLY STERILE: Performed by: UROLOGY

## 2023-04-19 PROCEDURE — 7100000010 HC PHASE II RECOVERY - FIRST 15 MIN: Performed by: UROLOGY

## 2023-04-19 PROCEDURE — 2709999900 HC NON-CHARGEABLE SUPPLY: Performed by: UROLOGY

## 2023-04-19 PROCEDURE — 2580000003 HC RX 258: Performed by: UROLOGY

## 2023-04-19 PROCEDURE — 3600000012 HC SURGERY LEVEL 2 ADDTL 15MIN: Performed by: UROLOGY

## 2023-04-19 PROCEDURE — 6360000002 HC RX W HCPCS: Performed by: ANESTHESIOLOGY

## 2023-04-19 RX ORDER — FENTANYL CITRATE 50 UG/ML
25 INJECTION, SOLUTION INTRAMUSCULAR; INTRAVENOUS EVERY 5 MIN PRN
Status: DISCONTINUED | OUTPATIENT
Start: 2023-04-19 | End: 2023-04-19 | Stop reason: HOSPADM

## 2023-04-19 RX ORDER — HYDROMORPHONE HYDROCHLORIDE 1 MG/ML
0.5 INJECTION, SOLUTION INTRAMUSCULAR; INTRAVENOUS; SUBCUTANEOUS EVERY 5 MIN PRN
Status: DISCONTINUED | OUTPATIENT
Start: 2023-04-19 | End: 2023-04-19 | Stop reason: HOSPADM

## 2023-04-19 RX ORDER — SODIUM CHLORIDE, SODIUM LACTATE, POTASSIUM CHLORIDE, CALCIUM CHLORIDE 600; 310; 30; 20 MG/100ML; MG/100ML; MG/100ML; MG/100ML
INJECTION, SOLUTION INTRAVENOUS CONTINUOUS
Status: DISCONTINUED | OUTPATIENT
Start: 2023-04-19 | End: 2023-04-19 | Stop reason: HOSPADM

## 2023-04-19 RX ORDER — DEXAMETHASONE SODIUM PHOSPHATE 4 MG/ML
INJECTION, SOLUTION INTRA-ARTICULAR; INTRALESIONAL; INTRAMUSCULAR; INTRAVENOUS; SOFT TISSUE PRN
Status: DISCONTINUED | OUTPATIENT
Start: 2023-04-19 | End: 2023-04-19 | Stop reason: SDUPTHER

## 2023-04-19 RX ORDER — LIDOCAINE HYDROCHLORIDE 20 MG/ML
INJECTION, SOLUTION EPIDURAL; INFILTRATION; INTRACAUDAL; PERINEURAL PRN
Status: DISCONTINUED | OUTPATIENT
Start: 2023-04-19 | End: 2023-04-19 | Stop reason: SDUPTHER

## 2023-04-19 RX ORDER — PROPOFOL 10 MG/ML
INJECTION, EMULSION INTRAVENOUS PRN
Status: DISCONTINUED | OUTPATIENT
Start: 2023-04-19 | End: 2023-04-19 | Stop reason: SDUPTHER

## 2023-04-19 RX ORDER — ONDANSETRON 2 MG/ML
INJECTION INTRAMUSCULAR; INTRAVENOUS PRN
Status: DISCONTINUED | OUTPATIENT
Start: 2023-04-19 | End: 2023-04-19 | Stop reason: SDUPTHER

## 2023-04-19 RX ORDER — EPHEDRINE SULFATE/0.9% NACL/PF 50 MG/5 ML
SYRINGE (ML) INTRAVENOUS PRN
Status: DISCONTINUED | OUTPATIENT
Start: 2023-04-19 | End: 2023-04-19 | Stop reason: SDUPTHER

## 2023-04-19 RX ORDER — HYDROCODONE BITARTRATE AND ACETAMINOPHEN 5; 325 MG/1; MG/1
1 TABLET ORAL EVERY 4 HOURS PRN
Qty: 15 TABLET | Refills: 0 | Status: SHIPPED | OUTPATIENT
Start: 2023-04-19 | End: 2023-05-03

## 2023-04-19 RX ORDER — SODIUM CHLORIDE 9 MG/ML
INJECTION, SOLUTION INTRAVENOUS PRN
Status: DISCONTINUED | OUTPATIENT
Start: 2023-04-19 | End: 2023-04-19 | Stop reason: HOSPADM

## 2023-04-19 RX ORDER — FENTANYL CITRATE 50 UG/ML
INJECTION, SOLUTION INTRAMUSCULAR; INTRAVENOUS PRN
Status: DISCONTINUED | OUTPATIENT
Start: 2023-04-19 | End: 2023-04-19 | Stop reason: SDUPTHER

## 2023-04-19 RX ORDER — PHENAZOPYRIDINE HYDROCHLORIDE 200 MG/1
200 TABLET, FILM COATED ORAL
Qty: 15 TABLET | Refills: 0 | Status: SHIPPED | OUTPATIENT
Start: 2023-04-19 | End: 2023-04-24

## 2023-04-19 RX ORDER — CEPHALEXIN 500 MG/1
500 CAPSULE ORAL 3 TIMES DAILY
Qty: 3 CAPSULE | Refills: 0 | Status: SHIPPED | OUTPATIENT
Start: 2023-04-19 | End: 2023-04-20

## 2023-04-19 RX ORDER — SODIUM CHLORIDE 0.9 % (FLUSH) 0.9 %
5-40 SYRINGE (ML) INJECTION PRN
Status: DISCONTINUED | OUTPATIENT
Start: 2023-04-19 | End: 2023-04-19 | Stop reason: HOSPADM

## 2023-04-19 RX ORDER — MIDAZOLAM HYDROCHLORIDE 1 MG/ML
INJECTION INTRAMUSCULAR; INTRAVENOUS PRN
Status: DISCONTINUED | OUTPATIENT
Start: 2023-04-19 | End: 2023-04-19 | Stop reason: SDUPTHER

## 2023-04-19 RX ORDER — SODIUM CHLORIDE 0.9 % (FLUSH) 0.9 %
5-40 SYRINGE (ML) INJECTION EVERY 12 HOURS SCHEDULED
Status: DISCONTINUED | OUTPATIENT
Start: 2023-04-19 | End: 2023-04-19 | Stop reason: HOSPADM

## 2023-04-19 RX ADMIN — PROPOFOL 200 MG: 10 INJECTION, EMULSION INTRAVENOUS at 13:27

## 2023-04-19 RX ADMIN — DEXAMETHASONE SODIUM PHOSPHATE 4 MG: 4 INJECTION, SOLUTION INTRAMUSCULAR; INTRAVENOUS at 13:35

## 2023-04-19 RX ADMIN — GEMCITABINE 1000 MG: 38 INJECTION, SOLUTION INTRAVENOUS at 13:55

## 2023-04-19 RX ADMIN — Medication 3000 MG: at 13:31

## 2023-04-19 RX ADMIN — ONDANSETRON HYDROCHLORIDE 4 MG: 2 INJECTION INTRAMUSCULAR; INTRAVENOUS at 13:35

## 2023-04-19 RX ADMIN — FENTANYL CITRATE 50 MCG: 50 INJECTION, SOLUTION INTRAMUSCULAR; INTRAVENOUS at 13:43

## 2023-04-19 RX ADMIN — FENTANYL CITRATE 25 MCG: 50 INJECTION INTRAMUSCULAR; INTRAVENOUS at 14:29

## 2023-04-19 RX ADMIN — LIDOCAINE HYDROCHLORIDE 100 MG: 20 INJECTION, SOLUTION EPIDURAL; INFILTRATION; INTRACAUDAL; PERINEURAL at 13:27

## 2023-04-19 RX ADMIN — HYDROMORPHONE HYDROCHLORIDE 1 MG: 1 INJECTION, SOLUTION INTRAMUSCULAR; INTRAVENOUS; SUBCUTANEOUS at 13:23

## 2023-04-19 RX ADMIN — Medication 10 MG: at 13:50

## 2023-04-19 RX ADMIN — FENTANYL CITRATE 50 MCG: 50 INJECTION, SOLUTION INTRAMUSCULAR; INTRAVENOUS at 13:48

## 2023-04-19 RX ADMIN — SODIUM CHLORIDE, SODIUM LACTATE, POTASSIUM CHLORIDE, AND CALCIUM CHLORIDE: 600; 310; 30; 20 INJECTION, SOLUTION INTRAVENOUS at 12:48

## 2023-04-19 RX ADMIN — FENTANYL CITRATE 25 MCG: 50 INJECTION INTRAMUSCULAR; INTRAVENOUS at 14:18

## 2023-04-19 RX ADMIN — SODIUM CHLORIDE, SODIUM LACTATE, POTASSIUM CHLORIDE, AND CALCIUM CHLORIDE: 600; 310; 30; 20 INJECTION, SOLUTION INTRAVENOUS at 13:50

## 2023-04-19 RX ADMIN — MIDAZOLAM 2 MG: 1 INJECTION INTRAMUSCULAR; INTRAVENOUS at 13:23

## 2023-04-19 ASSESSMENT — PAIN - FUNCTIONAL ASSESSMENT
PAIN_FUNCTIONAL_ASSESSMENT: WONG-BAKER FACES
PAIN_FUNCTIONAL_ASSESSMENT: 0-10

## 2023-04-19 ASSESSMENT — PAIN DESCRIPTION - LOCATION: LOCATION: PENIS

## 2023-04-19 ASSESSMENT — PAIN DESCRIPTION - DESCRIPTORS
DESCRIPTORS: SHOOTING
DESCRIPTORS: SHOOTING

## 2023-04-19 ASSESSMENT — PAIN SCALES - GENERAL
PAINLEVEL_OUTOF10: 5
PAINLEVEL_OUTOF10: 8

## 2023-04-19 NOTE — ANESTHESIA PRE PROCEDURE
Department of Anesthesiology  Preprocedure Note       Name:  Gege Toney   Age:  66 y.o.  :  1960                                          MRN:  [id number]         Date:  2023      Surgeon: aSmira Rivas):  Rose Chavez MD    Procedure: Procedure(s):  CYSTOSCOPY TRANSURETHRAL RESECTION OF BLADDER TUMOR LARGE W/ GEMCITABINE    Medications prior to admission:   Prior to Admission medications    Medication Sig Start Date End Date Taking? Authorizing Provider   fexofenadine (ALLEGRA) 180 MG tablet Take 1 tablet by mouth daily Indications: allergies    Historical Provider, MD   Esomeprazole Magnesium (NEXIUM 24HR PO) Take by mouth daily Indications: GERD    Historical Provider, MD   Multiple Vitamins-Minerals (THERAPEUTIC MULTIVITAMIN-MINERALS) tablet Take 1 tablet by mouth daily    Historical Provider, MD   valsartan (DIOVAN) 320 MG tablet Take 1 tablet by mouth daily Indications: HTN    Historical Provider, MD   amLODIPine (NORVASC) 5 MG tablet Take 1 tablet by mouth daily Indications: HTN    Historical Provider, MD   amLODIPine (NORVASC) 2.5 MG tablet Take 1 tablet by mouth every evening Indications: HTN    Historical Provider, MD   carvedilol (COREG) 12.5 MG tablet Take by mouth 2 times daily (with meals) Indications: HTN Takes 1.5 tabs    Historical Provider, MD   metFORMIN (GLUCOPHAGE) 1000 MG tablet Take 1 tablet by mouth 2 times daily (with meals) Indications: DM- type 2    Historical Provider, MD   Dulaglutide (TRULICITY) 3 EQ/3.1ZI SOPN Inject 3 mg into the skin once a week Indications: DM Wednesday    Historical Provider, MD   atorvastatin (LIPITOR) 20 MG tablet Take 1 tablet by mouth nightly Indications: high chol    Historical Provider, MD       Current medications:    No current facility-administered medications for this visit. No current outpatient medications on file.      Facility-Administered Medications Ordered in Other Visits   Medication Dose Route Frequency Provider Last Rate

## 2023-04-19 NOTE — DISCHARGE INSTRUCTIONS
or if unable to take medications  Any signs of decreased circulation or nerve impairment to extremity: change in color, persistent  numbness, tingling, coldness or increase pain  Any questions    What to do at Home:  Recommended activity: activity as tolerated and no driving until advised by doctor. *  Please give a list of your current medications to your Primary Care Provider. *  Please update this list whenever your medications are discontinued, doses are      changed, or new medications (including over-the-counter products) are added. *  Please carry medication information at all times in case of emergency situations. These are general instructions for a healthy lifestyle:    No smoking/ No tobacco products/ Avoid exposure to second hand smoke  Surgeon General's Warning:  Quitting smoking now greatly reduces serious risk to your health. Obesity, smoking, and sedentary lifestyle greatly increases your risk for illness    A healthy diet, regular physical exercise & weight monitoring are important for maintaining a healthy lifestyle    You may be retaining fluid if you have a history of heart failure or if you experience any of the following symptoms:  Weight gain of 3 pounds or more overnight or 5 pounds in a week, increased swelling in our hands or feet or shortness of breath while lying flat in bed. Please call your doctor as soon as you notice any of these symptoms; do not wait until your next office visit. Patient armband removed and shredded. The discharge information has been reviewed with the patient and caregiver. The patient and caregiver verbalized understanding. Discharge medications reviewed with the patient and caregiver and appropriate educational materials and side effects teaching were provided.

## 2023-04-19 NOTE — H&P
bleeding. Reproductive  Negative  Sexual dysfunction. Vital Signs  Height  Time  ft  in  cm  Last Measured  Height Position  10:36 AM  5.0  10.00  177.80  02/28/2023  0    Weight/BSA/BMI  Time  lb  oz  kg  Context  BMI kg/m2  BSA m2  10:36 AM  314.70    142.746  dressed with shoes  45.15      Measured by  Time  Measured by  10:36 AM  Shawna Oven    Physical Exam  Exam  Findings  Details  Constitutional  Normal  Well developed. Respiratory  Normal  Inspection - Normal.  Musculoskeletal  Normal  Gait - Normal.  Neurological  Normal  Level of consciousness - Normal. Orientation - Normal. Memory - Normal.  Psychiatric  Normal  Oriented to time, place, person and situation. Appropriate mood and affect.     Medications (added, continued, or stopped today)  Start Date  Medication  Directions  PRN Status  PRN Reason  Instruction  Stop Date  02/27/2023  gemcitabine 2 gram intravenous solution  instill 2 grams by intravesical route into the bladder, dilute with 50ml sodium chloride  N          metformin HCl  take 1 tablet by oral route 2 times every day with morning and evening meals  N            Active Patient Care Team Members  Name  Contact  Agency Type  Support Role  Relationship  Active Date  Inactive Date  [address] [facility]      Patient provider  PCP        Jose Neal        Spouse            Provider:

## 2023-04-19 NOTE — PERIOP NOTE
TRANSFER - IN REPORT:    Verbal report received from OR  on Lolly Diaz  being received from OR for routine post-op      Report consisted of patient's Situation, Background, Assessment and   Recommendations(SBAR). Information from the following report(s) Nurse Handoff Report, Adult Overview, Intake/Output, MAR, and Cardiac Rhythm NSR  was reviewed with the receiving nurse. Opportunity for questions and clarification was provided. Assessment completed upon patient's arrival to unit and care assumed.

## 2023-04-19 NOTE — ANESTHESIA POSTPROCEDURE EVALUATION
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)    Surgeons: Jennifer Nunes MD Responsible Provider: Erin Recinos MD    Anesthesia Type: General ASA Status: 3          Anesthesia Type: General    Patrick Phase I: Patrick Score: 10    Patrick Phase II:        Anesthesia Post Evaluation    Patient location during evaluation: PACU  Patient participation: complete - patient participated  Level of consciousness: awake  Pain score: 0  Airway patency: patent  Nausea & Vomiting: no nausea and no vomiting  Complications: no  Cardiovascular status: blood pressure returned to baseline  Respiratory status: acceptable  Hydration status: stable  Multimodal analgesia pain management approach

## 2023-04-19 NOTE — PERIOP NOTE
TRANSFER - IN REPORT:    Verbal report received from [address] (name) on Eli Frederick  being received from PACU (unit) for routine progression of patient care      Report consisted of patients Situation, Background, Assessment and   Recommendations(SBAR). Information from the following report(s) Nurse Handoff Report, Surgery Report, Intake/Output, and MAR was reviewed with the receiving nurse. Opportunity for questions and clarification was provided.       Assessment completed upon patients arrival to unit and care assume

## 2023-04-19 NOTE — PERIOP NOTE
TRANSFER - OUT REPORT:    Verbal report given to [address]  on [employer]  being transferred to Phase II  for routine progression of patient care       Report consisted of patient's Situation, Background, Assessment and   Recommendations(SBAR). Information from the following report(s) Surgery Report, Intake/Output, MAR, and Recent Results was reviewed with the receiving nurse. [place] Assessment: No data recorded  Lines:   Peripheral IV 04/19/23 Right Hand (Active)   Site Assessment Clean, dry & intact 04/19/23 1423   Line Status Blood return noted 04/19/23 [address] checked and tightened 04/19/23 1423   Phlebitis Assessment No symptoms 04/19/23 1423   Infiltration Assessment 0 04/19/23 1423   Dressing Status Clean, dry & intact 04/19/23 1423   Dressing Type Transparent 04/19/23 1423        Opportunity for questions and clarification was provided.       Patient transported with:  [employer]

## (undated) DEVICE — GARMENT,MEDLINE,DVT,INT,CALF,MED, GEN2: Brand: MEDLINE

## (undated) DEVICE — NEEDLE HYPO 22GA L1.5IN BLK S STL HUB POLYPR SHLD REG BVL

## (undated) DEVICE — SOL INJ L R 1000ML BG --

## (undated) DEVICE — REM POLYHESIVE ADULT PATIENT RETURN ELECTRODE: Brand: VALLEYLAB

## (undated) DEVICE — STERILE POLYISOPRENE POWDER-FREE SURGICAL GLOVES WITH EMOLLIENT COATING: Brand: PROTEXIS

## (undated) DEVICE — Z DISCONTINUED PER MEDLINE USE 2718072 DRESSING FOAM W5XL12.5CM SIL ADH THN BORDED CNFRM LO PROF

## (undated) DEVICE — TRAY PREP DRY W/ PREM GLV 2 APPL 6 SPNG 2 UNDPD 1 OVERWRAP

## (undated) DEVICE — SOL IRRIGATION INJ NACL 0.9% 500ML BTL

## (undated) DEVICE — SYRINGE,TOOMEY,IRRIGATION,70CC,STERILE: Brand: MEDLINE

## (undated) DEVICE — SOLUTION IRRIG 3000ML 0.9% SOD CHL USP UROMATIC PLAS CONT

## (undated) DEVICE — HANDPIECE SET WITH FAN SPRAY TIP: Brand: INTERPULSE

## (undated) DEVICE — SOLUTION IRRIG 3000ML LAC R FLX CONT

## (undated) DEVICE — DRESSING FOAM SELF ADH 20X10 CM ABSORBENT MEPILEX BORDER

## (undated) DEVICE — KENDALL SCD EXPRESS SLEEVES, KNEE LENGTH, MEDIUM: Brand: KENDALL SCD

## (undated) DEVICE — PAD,ABDOMINAL,5"X9",ST,LF,25/BX: Brand: MEDLINE INDUSTRIES, INC.

## (undated) DEVICE — PACK PROCEDURE SURG KNEE ARTHSCP CUST

## (undated) DEVICE — ADHESIVE SKIN CLOSURE 4X22 CM PREMIERPRO EXOFINFUSION DISP

## (undated) DEVICE — INSTRUMENT BATTERY

## (undated) DEVICE — STERILE POLYISOPRENE POWDER-FREE SURGICAL GLOVES: Brand: PROTEXIS

## (undated) DEVICE — CUTTING LOOP, BIPOLAR, 24/26 FR.: Brand: N.A.

## (undated) DEVICE — SYRINGE MED 20ML STD CLR PLAS LUERLOCK TIP N CTRL DISP

## (undated) DEVICE — PACK PROCEDURE SURG ANTR HIP

## (undated) DEVICE — DRAINBAG,ANTI-REFLUX TOWER,L/F,2000ML,LL: Brand: MEDLINE

## (undated) DEVICE — SUT VCRL + 1 36IN CT1 VIO --

## (undated) DEVICE — SUT MONOCRYL PLUS UD 3-0 --

## (undated) DEVICE — PAD,NON-ADHERENT,3X8,STERILE,LF,1/PK: Brand: MEDLINE

## (undated) DEVICE — SAFESECURE FOLEY DEVICE: Brand: MEDLINE

## (undated) DEVICE — CYSTO PACK: Brand: MEDLINE INDUSTRIES, INC.

## (undated) DEVICE — BAG  LEG  EX-TUBING  18IN  MEDIUM  20OZ

## (undated) DEVICE — DRESSING,GAUZE,XEROFORM,CURAD,5"X9",ST: Brand: CURAD

## (undated) DEVICE — SET FLD MGMT TB ARTHRO IRRIG ASPIR INFLO DISP PMP CASS

## (undated) DEVICE — APPLICATOR BNDG 1MM ADH PREMIERPRO EXOFIN

## (undated) DEVICE — Device

## (undated) DEVICE — INTENDED FOR TISSUE SEPARATION, AND OTHER PROCEDURES THAT REQUIRE A SHARP SURGICAL BLADE TO PUNCTURE OR CUT.: Brand: BARD-PARKER ® CARBON RIB-BACK BLADES

## (undated) DEVICE — 3M™ BAIR PAWS FLEX™ WARMING GOWN, SMALL, 20 PER CASE 81103: Brand: BAIR PAWS™

## (undated) DEVICE — SPONGE GZ W4XL4IN COT 12 PLY TYP VII WVN C FLD DSGN

## (undated) DEVICE — CATHETER URETH 16FR BLLN 5CC 2 W F SPEC M OLV COUDE TIP

## (undated) DEVICE — SYR LR LCK 1ML GRAD NSAF 30ML --

## (undated) DEVICE — GLOVE ORANGE PI 7 1/2   MSG9075

## (undated) DEVICE — NEEDLE HYPO 25GA L1.5IN BLU POLYPR HUB S STL REG BVL STR

## (undated) DEVICE — DEVON™ KNEE AND BODY STRAP 60" X 3" (1.5 M X 7.6 CM): Brand: DEVON

## (undated) DEVICE — CATHETER URO L17CM DIA16FR SIL ELASTMR F

## (undated) DEVICE — GOWN,SIRUS,NONRNF,SETINSLV,XL,20/CS: Brand: MEDLINE

## (undated) DEVICE — NDL PRT INJ NSAF BLNT 18GX1.5 --

## (undated) DEVICE — (D)PREP SKN CHLRAPRP APPL 26ML -- CONVERT TO ITEM 371833

## (undated) DEVICE — MASTISOL ADHESIVE LIQ 2/3ML

## (undated) DEVICE — SUT ETHLN 2-0 18IN FS BLK --

## (undated) DEVICE — TUBE IRRIG L8IN LNG PT W/ CONN FOR PMP SYS REDEUCE

## (undated) DEVICE — (D)STRIP SKN CLSR 0.5X4IN WHT --

## (undated) DEVICE — SOLUTION IV 250ML 0.9% SOD CHL CLR INJ FLX BG CONT PRT CLSR

## (undated) DEVICE — STRAP,POSITIONING,KNEE/BODY,FOAM,4X60": Brand: MEDLINE

## (undated) DEVICE — PADDING CAST SOF-ROL 6INX4YD --

## (undated) DEVICE — ORTHOLOCK(R) EX-PIN 4 MM X 150 MM

## (undated) DEVICE — SUT VCRL + 2-0 36IN CT1 UD --

## (undated) DEVICE — NEEDLE HYPO 18GA L1.5IN PNK POLYPR HUB S STL THN WALL FILL

## (undated) DEVICE — 4.5 MM INCISOR PLUS STRAIGHT                                    BLADES, POWER/EP-1, VIOLET, PACKAGED                                    6 PER BOX, STERILE

## (undated) DEVICE — BLADE SAW 1.27X13X90 MM FOR LG BNE

## (undated) DEVICE — SUTURE ETHLN SZ 3-0 L30IN NONABSORBABLE BLK FSL L30MM 3/8 1671H

## (undated) DEVICE — 3M™ IOBAN™ 2 ANTIMICROBIAL INCISE DRAPE 6650EZ: Brand: IOBAN™ 2

## (undated) DEVICE — THE CANADY HYBRID PLASMA SCALPEL IS AN ELECTROSURGICAL PLASMA SCALPEL THAT USES AN 85MM BENDABLE PADDLE BLADE TIP. THE ELECTROSURGICAL PLASMA SCALPEL IS USED TO SIMULTANEOUSLY CUT AND COAGULATE BIOLOGICAL TISSUE.: Brand: CANADY HYBRID PLASMA PADDLE BLADE

## (undated) DEVICE — MEDI-VAC SUCTION HANDLE REGULAR CAPACITY: Brand: CARDINAL HEALTH

## (undated) DEVICE — SYR 50ML LR LCK 1ML GRAD NSAF --